# Patient Record
Sex: MALE | Race: WHITE | Employment: OTHER | URBAN - METROPOLITAN AREA
[De-identification: names, ages, dates, MRNs, and addresses within clinical notes are randomized per-mention and may not be internally consistent; named-entity substitution may affect disease eponyms.]

---

## 2017-01-06 ENCOUNTER — TELEPHONE (OUTPATIENT)
Dept: FAMILY MEDICINE CLINIC | Age: 67
End: 2017-01-06

## 2017-01-06 RX ORDER — CEPHALEXIN 500 MG/1
500 CAPSULE ORAL 3 TIMES DAILY
Qty: 30 CAPSULE | Refills: 0 | Status: SHIPPED | OUTPATIENT
Start: 2017-01-06 | End: 2017-01-16

## 2017-01-16 ENCOUNTER — OFFICE VISIT (OUTPATIENT)
Dept: INTERNAL MEDICINE | Age: 67
End: 2017-01-16

## 2017-01-16 VITALS
HEART RATE: 56 BPM | SYSTOLIC BLOOD PRESSURE: 140 MMHG | BODY MASS INDEX: 33.72 KG/M2 | DIASTOLIC BLOOD PRESSURE: 70 MMHG | HEIGHT: 72 IN | WEIGHT: 249 LBS

## 2017-01-16 DIAGNOSIS — H61.21 CERUMEN DEBRIS ON TYMPANIC MEMBRANE, RIGHT: ICD-10-CM

## 2017-01-16 DIAGNOSIS — H91.91 HEARING LOSS, RIGHT: ICD-10-CM

## 2017-01-16 DIAGNOSIS — I10 ESSENTIAL HYPERTENSION: ICD-10-CM

## 2017-01-16 DIAGNOSIS — E55.9 VITAMIN D DEFICIENCY: ICD-10-CM

## 2017-01-16 DIAGNOSIS — Z95.1 S/P CABG X 6: ICD-10-CM

## 2017-01-16 DIAGNOSIS — I25.10 ASCVD (ARTERIOSCLEROTIC CARDIOVASCULAR DISEASE): Primary | ICD-10-CM

## 2017-01-16 DIAGNOSIS — E78.5 HYPERLIPIDEMIA, UNSPECIFIED HYPERLIPIDEMIA TYPE: ICD-10-CM

## 2017-01-16 PROCEDURE — 99214 OFFICE O/P EST MOD 30 MIN: CPT | Performed by: INTERNAL MEDICINE

## 2017-01-16 PROCEDURE — 1123F ACP DISCUSS/DSCN MKR DOCD: CPT | Performed by: INTERNAL MEDICINE

## 2017-01-16 PROCEDURE — 4040F PNEUMOC VAC/ADMIN/RCVD: CPT | Performed by: INTERNAL MEDICINE

## 2017-01-16 PROCEDURE — G8427 DOCREV CUR MEDS BY ELIG CLIN: HCPCS | Performed by: INTERNAL MEDICINE

## 2017-01-16 PROCEDURE — G8599 NO ASA/ANTIPLAT THER USE RNG: HCPCS | Performed by: INTERNAL MEDICINE

## 2017-01-16 PROCEDURE — G8484 FLU IMMUNIZE NO ADMIN: HCPCS | Performed by: INTERNAL MEDICINE

## 2017-01-16 PROCEDURE — 93000 ELECTROCARDIOGRAM COMPLETE: CPT | Performed by: INTERNAL MEDICINE

## 2017-01-16 PROCEDURE — 1036F TOBACCO NON-USER: CPT | Performed by: INTERNAL MEDICINE

## 2017-01-16 PROCEDURE — G8419 CALC BMI OUT NRM PARAM NOF/U: HCPCS | Performed by: INTERNAL MEDICINE

## 2017-01-16 PROCEDURE — 3017F COLORECTAL CA SCREEN DOC REV: CPT | Performed by: INTERNAL MEDICINE

## 2017-01-16 RX ORDER — ERGOCALCIFEROL 1.25 MG/1
CAPSULE ORAL
Qty: 12 CAPSULE | Refills: 3 | Status: SHIPPED | OUTPATIENT
Start: 2017-01-16 | End: 2017-02-26 | Stop reason: SDUPTHER

## 2017-01-20 RX ORDER — ATORVASTATIN CALCIUM 40 MG/1
TABLET, FILM COATED ORAL
Qty: 90 TABLET | Refills: 1 | Status: SHIPPED | OUTPATIENT
Start: 2017-01-20 | End: 2017-07-10 | Stop reason: SDUPTHER

## 2017-02-26 DIAGNOSIS — E55.9 VITAMIN D DEFICIENCY: ICD-10-CM

## 2017-02-28 RX ORDER — ERGOCALCIFEROL 1.25 MG/1
CAPSULE ORAL
Qty: 12 CAPSULE | Refills: 1 | Status: SHIPPED | OUTPATIENT
Start: 2017-02-28 | End: 2017-08-15 | Stop reason: SDUPTHER

## 2017-04-26 DIAGNOSIS — I10 ESSENTIAL HYPERTENSION: ICD-10-CM

## 2017-04-26 DIAGNOSIS — I25.10 CORONARY ARTERY DISEASE INVOLVING NATIVE CORONARY ARTERY OF NATIVE HEART WITHOUT ANGINA PECTORIS: ICD-10-CM

## 2017-06-16 ENCOUNTER — TELEPHONE (OUTPATIENT)
Dept: FAMILY MEDICINE CLINIC | Age: 67
End: 2017-06-16

## 2017-06-16 ENCOUNTER — OFFICE VISIT (OUTPATIENT)
Dept: FAMILY MEDICINE CLINIC | Age: 67
End: 2017-06-16

## 2017-06-16 VITALS
RESPIRATION RATE: 16 BRPM | HEART RATE: 68 BPM | DIASTOLIC BLOOD PRESSURE: 66 MMHG | BODY MASS INDEX: 33.93 KG/M2 | SYSTOLIC BLOOD PRESSURE: 154 MMHG | WEIGHT: 250.5 LBS | HEIGHT: 72 IN

## 2017-06-16 DIAGNOSIS — S90.112A CONTUSION OF LEFT GREAT TOE WITHOUT DAMAGE TO NAIL, INITIAL ENCOUNTER: Primary | ICD-10-CM

## 2017-06-16 PROCEDURE — 99213 OFFICE O/P EST LOW 20 MIN: CPT | Performed by: FAMILY MEDICINE

## 2017-06-16 ASSESSMENT — ENCOUNTER SYMPTOMS
CONSTIPATION: 0
SHORTNESS OF BREATH: 0
SINUS PRESSURE: 0

## 2017-07-10 ENCOUNTER — OFFICE VISIT (OUTPATIENT)
Dept: INTERNAL MEDICINE | Age: 67
End: 2017-07-10

## 2017-07-10 VITALS
BODY MASS INDEX: 33.46 KG/M2 | HEIGHT: 72 IN | HEART RATE: 56 BPM | SYSTOLIC BLOOD PRESSURE: 130 MMHG | WEIGHT: 247 LBS | DIASTOLIC BLOOD PRESSURE: 70 MMHG

## 2017-07-10 DIAGNOSIS — Z95.1 S/P CABG X 6: ICD-10-CM

## 2017-07-10 DIAGNOSIS — E66.09 NON MORBID OBESITY DUE TO EXCESS CALORIES: ICD-10-CM

## 2017-07-10 DIAGNOSIS — I10 ESSENTIAL HYPERTENSION: ICD-10-CM

## 2017-07-10 DIAGNOSIS — E78.5 HYPERLIPIDEMIA, UNSPECIFIED HYPERLIPIDEMIA TYPE: ICD-10-CM

## 2017-07-10 DIAGNOSIS — I25.10 ASCVD (ARTERIOSCLEROTIC CARDIOVASCULAR DISEASE): Primary | ICD-10-CM

## 2017-07-10 PROCEDURE — 1123F ACP DISCUSS/DSCN MKR DOCD: CPT | Performed by: INTERNAL MEDICINE

## 2017-07-10 PROCEDURE — 99214 OFFICE O/P EST MOD 30 MIN: CPT | Performed by: INTERNAL MEDICINE

## 2017-07-10 PROCEDURE — 1036F TOBACCO NON-USER: CPT | Performed by: INTERNAL MEDICINE

## 2017-07-10 PROCEDURE — G8417 CALC BMI ABV UP PARAM F/U: HCPCS | Performed by: INTERNAL MEDICINE

## 2017-07-10 PROCEDURE — 3017F COLORECTAL CA SCREEN DOC REV: CPT | Performed by: INTERNAL MEDICINE

## 2017-07-10 PROCEDURE — 4040F PNEUMOC VAC/ADMIN/RCVD: CPT | Performed by: INTERNAL MEDICINE

## 2017-07-10 PROCEDURE — G8599 NO ASA/ANTIPLAT THER USE RNG: HCPCS | Performed by: INTERNAL MEDICINE

## 2017-07-10 PROCEDURE — 93000 ELECTROCARDIOGRAM COMPLETE: CPT | Performed by: INTERNAL MEDICINE

## 2017-07-10 PROCEDURE — G8427 DOCREV CUR MEDS BY ELIG CLIN: HCPCS | Performed by: INTERNAL MEDICINE

## 2017-07-10 RX ORDER — ATORVASTATIN CALCIUM 40 MG/1
TABLET, FILM COATED ORAL
Qty: 90 TABLET | Refills: 1 | Status: SHIPPED | OUTPATIENT
Start: 2017-07-10 | End: 2018-01-08 | Stop reason: SDUPTHER

## 2017-08-15 DIAGNOSIS — E55.9 VITAMIN D DEFICIENCY: ICD-10-CM

## 2017-08-16 RX ORDER — ERGOCALCIFEROL 1.25 MG/1
CAPSULE ORAL
Qty: 12 CAPSULE | Refills: 1 | Status: SHIPPED | OUTPATIENT
Start: 2017-08-16 | End: 2018-01-31 | Stop reason: SDUPTHER

## 2017-10-19 ENCOUNTER — NURSE ONLY (OUTPATIENT)
Dept: FAMILY MEDICINE CLINIC | Age: 67
End: 2017-10-19

## 2017-10-19 DIAGNOSIS — Z23 IMMUNIZATION DUE: Primary | ICD-10-CM

## 2017-10-19 PROCEDURE — G0008 ADMIN INFLUENZA VIRUS VAC: HCPCS | Performed by: EMERGENCY MEDICINE

## 2017-10-23 DIAGNOSIS — I10 ESSENTIAL HYPERTENSION: ICD-10-CM

## 2017-10-23 DIAGNOSIS — I25.10 CORONARY ARTERY DISEASE INVOLVING NATIVE CORONARY ARTERY OF NATIVE HEART WITHOUT ANGINA PECTORIS: ICD-10-CM

## 2018-01-08 ENCOUNTER — OFFICE VISIT (OUTPATIENT)
Dept: INTERNAL MEDICINE CLINIC | Age: 68
End: 2018-01-08
Payer: MEDICARE

## 2018-01-08 VITALS
DIASTOLIC BLOOD PRESSURE: 88 MMHG | WEIGHT: 243 LBS | HEIGHT: 72 IN | SYSTOLIC BLOOD PRESSURE: 156 MMHG | BODY MASS INDEX: 32.91 KG/M2 | HEART RATE: 52 BPM

## 2018-01-08 DIAGNOSIS — E78.5 HYPERLIPIDEMIA, UNSPECIFIED HYPERLIPIDEMIA TYPE: ICD-10-CM

## 2018-01-08 DIAGNOSIS — I10 ESSENTIAL HYPERTENSION: ICD-10-CM

## 2018-01-08 DIAGNOSIS — I25.10 ASCVD (ARTERIOSCLEROTIC CARDIOVASCULAR DISEASE): Primary | ICD-10-CM

## 2018-01-08 DIAGNOSIS — E66.9 OBESITY (BMI 30.0-34.9): ICD-10-CM

## 2018-01-08 DIAGNOSIS — Z95.1 S/P CABG X 6: ICD-10-CM

## 2018-01-08 PROCEDURE — 99214 OFFICE O/P EST MOD 30 MIN: CPT | Performed by: INTERNAL MEDICINE

## 2018-01-08 PROCEDURE — 93000 ELECTROCARDIOGRAM COMPLETE: CPT | Performed by: INTERNAL MEDICINE

## 2018-01-08 RX ORDER — ATORVASTATIN CALCIUM 40 MG/1
TABLET, FILM COATED ORAL
Qty: 90 TABLET | Refills: 1 | Status: SHIPPED | OUTPATIENT
Start: 2018-01-08 | End: 2018-07-07 | Stop reason: SDUPTHER

## 2018-01-08 ASSESSMENT — PATIENT HEALTH QUESTIONNAIRE - PHQ9
SUM OF ALL RESPONSES TO PHQ9 QUESTIONS 1 & 2: 0
SUM OF ALL RESPONSES TO PHQ QUESTIONS 1-9: 0
1. LITTLE INTEREST OR PLEASURE IN DOING THINGS: 0
2. FEELING DOWN, DEPRESSED OR HOPELESS: 0

## 2018-01-08 NOTE — PROGRESS NOTES
TAKE 1 TABLET TWICE A  tablet 1    vitamin D (ERGOCALCIFEROL) 13240 units CAPS capsule TAKE 1 CAPSULE ONCE WEEKLY 12 capsule 1    aspirin 81 MG tablet Take 1 tablet by mouth daily Last dose 5- for surgery 6-5-2015 30 tablet 0    Multiple Vitamin (MULTI-VITAMIN) TABS   Take by mouth daily Last dose 5- for surgery 6-5-2015      [DISCONTINUED] atorvastatin (LIPITOR) 40 MG tablet TAKE 1 TABLET DAILY 90 tablet 1    [DISCONTINUED] fluticasone (FLONASE) 50 MCG/ACT nasal spray 2 sprays by Nasal route daily 1 Bottle 3     No facility-administered encounter medications on file as of 1/8/2018. DIAGNOSES  1. ASCVD (arteriosclerotic cardiovascular disease)     2. Essential hypertension  EKG 12 Lead    Basic Metabolic Panel   3. Hyperlipidemia, unspecified hyperlipidemia type  atorvastatin (LIPITOR) 40 MG tablet    Basic Metabolic Panel    LDL Cholesterol, Direct    Lipid Panel   4. S/P CABG x 6,2007     5. Obesity (BMI 30.0-34. 9)     EKG shows Sinus  Bradycardia   Otherwise normal    Cardiac status stable, patient doing extremely well without any symptoms. I reviewed patient's medications and possible interactions and side effects. I refilled those that were needed. Reviewed all recent labs, will follow up labs ordered and notify patient of any changes in management  Per the recent ACC/AHA guidelines on the treatment of blood cholesterol to reduce atherosclerotic cardiovascular risk in adults there are four major statin benefit groups.     Individuals   1) with clinical ASCVD    2) with primary elevations of LDL-C> 190 mg/dl    3) without clinical ASCVD or diabetes with LDL-C >70 to 189 mg/dl and estimated 10 year ASCVD risk> 7.5%    4) with diabetes aged 43-69 with LDL-C 70 to 189 mg/dl and without clinical ASCVD    Patient falls in to group 1      The recommended treatment for each group is as follows:  1) high-intensity statin, if age > 76 or not a candidate for high-intensity statin moderate

## 2018-01-13 ENCOUNTER — HOSPITAL ENCOUNTER (OUTPATIENT)
Age: 68
Discharge: HOME | End: 2018-01-13
Payer: MEDICARE

## 2018-01-13 DIAGNOSIS — E78.5 HYPERLIPIDEMIA, UNSPECIFIED HYPERLIPIDEMIA TYPE: ICD-10-CM

## 2018-01-13 DIAGNOSIS — I10 ESSENTIAL HYPERTENSION: ICD-10-CM

## 2018-01-13 LAB
ANION GAP SERPL CALCULATED.3IONS-SCNC: 13 MEQ/L (ref 8–16)
BUN BLDV-MCNC: 16 MG/DL (ref 7–22)
CALCIUM SERPL-MCNC: 9 MG/DL (ref 8.5–10.5)
CHLORIDE BLD-SCNC: 108 MEQ/L (ref 98–111)
CHOLESTEROL, TOTAL: 100 MG/DL (ref 100–199)
CO2: 23 MEQ/L (ref 23–33)
CREAT SERPL-MCNC: 0.8 MG/DL (ref 0.4–1.2)
GFR SERPL CREATININE-BSD FRML MDRD: > 90 ML/MIN/1.73M2
GLUCOSE BLD-MCNC: 103 MG/DL (ref 70–108)
HDLC SERPL-MCNC: 34 MG/DL
LDL CHOLESTEROL CALCULATED: 36 MG/DL
LDL CHOLESTEROL DIRECT: 50.8 MG/DL
POTASSIUM SERPL-SCNC: 4.4 MEQ/L (ref 3.5–5.2)
SODIUM BLD-SCNC: 144 MEQ/L (ref 135–145)
TRIGL SERPL-MCNC: 148 MG/DL (ref 0–199)

## 2018-01-13 PROCEDURE — 36415 COLL VENOUS BLD VENIPUNCTURE: CPT

## 2018-01-13 PROCEDURE — 80048 BASIC METABOLIC PNL TOTAL CA: CPT

## 2018-01-13 PROCEDURE — 80061 LIPID PANEL: CPT

## 2018-01-13 PROCEDURE — 83721 ASSAY OF BLOOD LIPOPROTEIN: CPT

## 2018-01-31 DIAGNOSIS — E55.9 VITAMIN D DEFICIENCY: ICD-10-CM

## 2018-02-01 RX ORDER — ERGOCALCIFEROL 1.25 MG/1
CAPSULE ORAL
Qty: 12 CAPSULE | Refills: 1 | Status: SHIPPED | OUTPATIENT
Start: 2018-02-01 | End: 2018-07-09 | Stop reason: SDUPTHER

## 2018-04-22 DIAGNOSIS — I10 ESSENTIAL HYPERTENSION: ICD-10-CM

## 2018-04-22 DIAGNOSIS — I25.10 CORONARY ARTERY DISEASE INVOLVING NATIVE CORONARY ARTERY OF NATIVE HEART WITHOUT ANGINA PECTORIS: ICD-10-CM

## 2018-05-14 ENCOUNTER — TELEPHONE (OUTPATIENT)
Dept: FAMILY MEDICINE CLINIC | Age: 68
End: 2018-05-14

## 2018-05-14 ENCOUNTER — OFFICE VISIT (OUTPATIENT)
Dept: FAMILY MEDICINE CLINIC | Age: 68
End: 2018-05-14

## 2018-05-14 VITALS
DIASTOLIC BLOOD PRESSURE: 70 MMHG | HEIGHT: 72 IN | RESPIRATION RATE: 16 BRPM | WEIGHT: 237.8 LBS | BODY MASS INDEX: 32.21 KG/M2 | HEART RATE: 68 BPM | SYSTOLIC BLOOD PRESSURE: 120 MMHG

## 2018-05-14 DIAGNOSIS — I25.810 CORONARY ARTERY DISEASE INVOLVING CORONARY BYPASS GRAFT OF NATIVE HEART WITHOUT ANGINA PECTORIS: ICD-10-CM

## 2018-05-14 DIAGNOSIS — Z00.00 ROUTINE GENERAL MEDICAL EXAMINATION AT A HEALTH CARE FACILITY: Primary | ICD-10-CM

## 2018-05-14 DIAGNOSIS — I10 ESSENTIAL HYPERTENSION: ICD-10-CM

## 2018-05-14 DIAGNOSIS — I10 ESSENTIAL HYPERTENSION: Primary | ICD-10-CM

## 2018-05-14 DIAGNOSIS — I25.810 CORONARY ARTERY DISEASE INVOLVING AUTOLOGOUS VEIN CORONARY BYPASS GRAFT WITHOUT ANGINA PECTORIS: ICD-10-CM

## 2018-05-14 DIAGNOSIS — E78.5 HYPERLIPIDEMIA, UNSPECIFIED HYPERLIPIDEMIA TYPE: ICD-10-CM

## 2018-05-14 DIAGNOSIS — Z00.00 ENCOUNTER FOR MEDICARE ANNUAL WELLNESS EXAM: ICD-10-CM

## 2018-05-14 DIAGNOSIS — N40.0 BENIGN PROSTATIC HYPERPLASIA WITHOUT LOWER URINARY TRACT SYMPTOMS: ICD-10-CM

## 2018-05-14 PROCEDURE — G0439 PPPS, SUBSEQ VISIT: HCPCS | Performed by: FAMILY MEDICINE

## 2018-05-14 ASSESSMENT — LIFESTYLE VARIABLES: HOW OFTEN DO YOU HAVE A DRINK CONTAINING ALCOHOL: 0

## 2018-05-14 ASSESSMENT — ANXIETY QUESTIONNAIRES
GAD7 TOTAL SCORE: 0
GAD7 TOTAL SCORE: 0

## 2018-05-14 ASSESSMENT — PATIENT HEALTH QUESTIONNAIRE - PHQ9: SUM OF ALL RESPONSES TO PHQ QUESTIONS 1-9: 0

## 2018-05-17 ENCOUNTER — HOSPITAL ENCOUNTER (OUTPATIENT)
Age: 68
Discharge: HOME | End: 2018-05-17
Payer: MEDICARE

## 2018-05-17 DIAGNOSIS — E78.5 HYPERLIPIDEMIA, UNSPECIFIED HYPERLIPIDEMIA TYPE: ICD-10-CM

## 2018-05-17 DIAGNOSIS — I10 ESSENTIAL HYPERTENSION: ICD-10-CM

## 2018-05-17 DIAGNOSIS — N40.0 BENIGN PROSTATIC HYPERPLASIA WITHOUT LOWER URINARY TRACT SYMPTOMS: ICD-10-CM

## 2018-05-17 LAB
ALBUMIN SERPL-MCNC: 4 G/DL (ref 3.5–5.1)
ALP BLD-CCNC: 114 U/L (ref 38–126)
ALT SERPL-CCNC: 25 U/L (ref 11–66)
ANION GAP SERPL CALCULATED.3IONS-SCNC: 13 MEQ/L (ref 8–16)
AST SERPL-CCNC: 23 U/L (ref 5–40)
BASOPHILS # BLD: 0.4 %
BASOPHILS ABSOLUTE: 0 THOU/MM3 (ref 0–0.1)
BILIRUB SERPL-MCNC: 0.4 MG/DL (ref 0.3–1.2)
BUN BLDV-MCNC: 18 MG/DL (ref 7–22)
CALCIUM SERPL-MCNC: 9 MG/DL (ref 8.5–10.5)
CHLORIDE BLD-SCNC: 107 MEQ/L (ref 98–111)
CO2: 23 MEQ/L (ref 23–33)
CREAT SERPL-MCNC: 0.8 MG/DL (ref 0.4–1.2)
EOSINOPHIL # BLD: 4.8 %
EOSINOPHILS ABSOLUTE: 0.3 THOU/MM3 (ref 0–0.4)
GFR SERPL CREATININE-BSD FRML MDRD: > 90 ML/MIN/1.73M2
GLUCOSE BLD-MCNC: 97 MG/DL (ref 70–108)
HCT VFR BLD CALC: 41.7 % (ref 42–52)
HEMOGLOBIN: 14 GM/DL (ref 14–18)
LDL CHOLESTEROL DIRECT: 47.12 MG/DL
LYMPHOCYTES # BLD: 33.4 %
LYMPHOCYTES ABSOLUTE: 2.4 THOU/MM3 (ref 1–4.8)
MCH RBC QN AUTO: 31.9 PG (ref 27–31)
MCHC RBC AUTO-ENTMCNC: 33.6 GM/DL (ref 33–37)
MCV RBC AUTO: 94.9 FL (ref 80–94)
MONOCYTES # BLD: 9.7 %
MONOCYTES ABSOLUTE: 0.7 THOU/MM3 (ref 0.4–1.3)
NUCLEATED RED BLOOD CELLS: 0 /100 WBC
PDW BLD-RTO: 13.7 % (ref 11.5–14.5)
PLATELET # BLD: 172 THOU/MM3 (ref 130–400)
PMV BLD AUTO: 8.7 FL (ref 7.4–10.4)
POTASSIUM SERPL-SCNC: 4.1 MEQ/L (ref 3.5–5.2)
PROSTATE SPECIFIC ANTIGEN: 4.18 NG/ML (ref 0–1)
RBC # BLD: 4.4 MILL/MM3 (ref 4.7–6.1)
SEG NEUTROPHILS: 51.7 %
SEGMENTED NEUTROPHILS ABSOLUTE COUNT: 3.7 THOU/MM3 (ref 1.8–7.7)
SODIUM BLD-SCNC: 143 MEQ/L (ref 135–145)
TOTAL PROTEIN: 7.4 G/DL (ref 6.1–8)
TSH SERPL DL<=0.05 MIU/L-ACNC: 2.25 UIU/ML (ref 0.4–4.2)
WBC # BLD: 7.2 THOU/MM3 (ref 4.8–10.8)

## 2018-05-17 PROCEDURE — 84153 ASSAY OF PSA TOTAL: CPT

## 2018-05-17 PROCEDURE — 84443 ASSAY THYROID STIM HORMONE: CPT

## 2018-05-17 PROCEDURE — 80053 COMPREHEN METABOLIC PANEL: CPT

## 2018-05-17 PROCEDURE — 85025 COMPLETE CBC W/AUTO DIFF WBC: CPT

## 2018-05-17 PROCEDURE — 36415 COLL VENOUS BLD VENIPUNCTURE: CPT

## 2018-05-17 PROCEDURE — 83721 ASSAY OF BLOOD LIPOPROTEIN: CPT

## 2018-05-21 ENCOUNTER — TELEPHONE (OUTPATIENT)
Dept: FAMILY MEDICINE CLINIC | Age: 68
End: 2018-05-21

## 2018-07-07 DIAGNOSIS — E78.5 HYPERLIPIDEMIA, UNSPECIFIED HYPERLIPIDEMIA TYPE: ICD-10-CM

## 2018-07-09 ENCOUNTER — OFFICE VISIT (OUTPATIENT)
Dept: INTERNAL MEDICINE CLINIC | Age: 68
End: 2018-07-09
Payer: MEDICARE

## 2018-07-09 VITALS
BODY MASS INDEX: 31.97 KG/M2 | WEIGHT: 236 LBS | HEIGHT: 72 IN | SYSTOLIC BLOOD PRESSURE: 146 MMHG | DIASTOLIC BLOOD PRESSURE: 70 MMHG | HEART RATE: 56 BPM

## 2018-07-09 DIAGNOSIS — I10 ESSENTIAL HYPERTENSION: ICD-10-CM

## 2018-07-09 DIAGNOSIS — I25.10 ASCVD (ARTERIOSCLEROTIC CARDIOVASCULAR DISEASE): Primary | ICD-10-CM

## 2018-07-09 DIAGNOSIS — E55.9 VITAMIN D DEFICIENCY: ICD-10-CM

## 2018-07-09 DIAGNOSIS — E78.5 HYPERLIPIDEMIA, UNSPECIFIED HYPERLIPIDEMIA TYPE: ICD-10-CM

## 2018-07-09 DIAGNOSIS — Z95.1 S/P CABG X 6: ICD-10-CM

## 2018-07-09 DIAGNOSIS — T14.90XA TRAUMA: ICD-10-CM

## 2018-07-09 DIAGNOSIS — I25.10 CORONARY ARTERY DISEASE INVOLVING NATIVE CORONARY ARTERY OF NATIVE HEART WITHOUT ANGINA PECTORIS: ICD-10-CM

## 2018-07-09 PROCEDURE — 93000 ELECTROCARDIOGRAM COMPLETE: CPT | Performed by: INTERNAL MEDICINE

## 2018-07-09 PROCEDURE — 99214 OFFICE O/P EST MOD 30 MIN: CPT | Performed by: INTERNAL MEDICINE

## 2018-07-09 RX ORDER — ERGOCALCIFEROL 1.25 MG/1
CAPSULE ORAL
Qty: 12 CAPSULE | Refills: 1 | Status: SHIPPED | OUTPATIENT
Start: 2018-07-09 | End: 2018-12-17 | Stop reason: SDUPTHER

## 2018-07-09 RX ORDER — ATORVASTATIN CALCIUM 40 MG/1
TABLET, FILM COATED ORAL
Qty: 90 TABLET | Refills: 1 | Status: SHIPPED | OUTPATIENT
Start: 2018-07-09 | End: 2018-12-17 | Stop reason: SDUPTHER

## 2018-07-09 RX ORDER — SENNOSIDES 8.6 MG
650 CAPSULE ORAL EVERY 8 HOURS PRN
COMMUNITY

## 2018-07-09 NOTE — PROGRESS NOTES
bleeding disorder. Respiratory ROS: no cough, shortness of breath, or wheezing  Cardiovascular ROS: no chest pain or dyspnea on exertion  Gastrointestinal ROS: no abdominal pain, change in bowel habits, or black or bloody stools  Genito-Urinary ROS: no dysuria, trouble voiding, or hematuria  Musculoskeletal ROS:  negative  Neurological ROS: no TIA or stroke symptoms  Dermatological ROS: negative    Blood pressure (!) 146/70, pulse 56, height 5' 11.65\" (1.82 m), weight 236 lb (107 kg). Body mass index is 32.32 kg/m². Physical Examination: General appearance - alert, well appearing, and in no distress  Mental status - alert, oriented to person, place, and time  HEENT-right eye bandaged ecchymoses present  Neck - Neck is supple, no JVD or carotid bruits. No thyromegaly or adenopathy. Chest - clear to auscultation, no wheezes, rales or rhonchi, symmetric air entry  Heart - normal rate, regular rhythm, normal S1, S2, no murmurs, rubs, clicks or gallops  Abdomen - soft, nontender, nondistended, no masses or organomegaly  Neurological - alert, oriented, normal speech, no focal findings or movement disorder noted  Extremities - peripheral pulses normal, no pedal edema, no clubbing or cyanosis  Skin - normal coloration and turgor, no rashes, no suspicious skin lesions noted    Orders Placed This Encounter   Procedures    EKG 12 Lead     Order Specific Question:   Reason for Exam?     Answer:    Other       Outpatient Encounter Prescriptions as of 7/9/2018   Medication Sig Dispense Refill    atorvastatin (LIPITOR) 40 MG tablet TAKE 1 TABLET DAILY 90 tablet 1    acetaminophen (TYLENOL ARTHRITIS PAIN) 650 MG extended release tablet Take 650 mg by mouth every 8 hours as needed for Pain      metoprolol tartrate (LOPRESSOR) 25 MG tablet TAKE 1 TABLET TWICE A  tablet 1    vitamin D (ERGOCALCIFEROL) 06646 units CAPS capsule TAKE 1 CAPSULE ONCE WEEKLY 12 capsule 1    sodium chloride (OCEAN, BABY AYR) 0.65 % nasal spray 1 spray by Nasal route as needed for Congestion      aspirin 81 MG tablet Take 1 tablet by mouth daily Last dose 5- for surgery 6-5-2015 30 tablet 0    Multiple Vitamin (MULTI-VITAMIN) TABS   Take by mouth daily Last dose 5- for surgery 6-5-2015      [DISCONTINUED] metoprolol tartrate (LOPRESSOR) 25 MG tablet TAKE 1 TABLET TWICE A  tablet 1    [DISCONTINUED] vitamin D (ERGOCALCIFEROL) 40681 units CAPS capsule TAKE 1 CAPSULE ONCE WEEKLY 12 capsule 1     No facility-administered encounter medications on file as of 7/9/2018. DIAGNOSES   Diagnosis Orders   1. ASCVD (arteriosclerotic cardiovascular disease)     2. Coronary artery disease involving native coronary artery of native heart without angina pectoris  metoprolol tartrate (LOPRESSOR) 25 MG tablet   3. Essential hypertension  metoprolol tartrate (LOPRESSOR) 25 MG tablet    stable   4. Vitamin D deficiency  EKG 12 Lead    vitamin D (ERGOCALCIFEROL) 66022 units CAPS capsule   5. Hyperlipidemia, unspecified hyperlipidemia type     6. S/P CABG x 6,2007     7. Trauma     EKG shows Sinus  Bradycardia   Otherwise normal    Cardiac status stable, patient doing extremely well without any symptoms. I reviewed patient's medications and possible interactions and side effects. I refilled those that were needed. Reviewed all recent labs, will follow up labs ordered and notify patient of any changes in management  Per the recent ACC/AHA guidelines on the treatment of blood cholesterol to reduce atherosclerotic cardiovascular risk in adults there are four major statin benefit groups.     Individuals   1) with clinical ASCVD    2) with primary elevations of LDL-C> 190 mg/dl    3) without clinical ASCVD or diabetes with LDL-C >70 to 189 mg/dl and estimated 10 year ASCVD risk> 7.5%    4) with diabetes aged 43-69 with LDL-C 70 to 189 mg/dl and without clinical ASCVD    Patient falls in to group 1      The recommended treatment for each group is

## 2018-12-17 ENCOUNTER — OFFICE VISIT (OUTPATIENT)
Dept: INTERNAL MEDICINE CLINIC | Age: 68
End: 2018-12-17
Payer: MEDICARE

## 2018-12-17 VITALS
DIASTOLIC BLOOD PRESSURE: 84 MMHG | WEIGHT: 237 LBS | BODY MASS INDEX: 32.1 KG/M2 | HEIGHT: 72 IN | SYSTOLIC BLOOD PRESSURE: 138 MMHG | HEART RATE: 56 BPM

## 2018-12-17 DIAGNOSIS — E55.9 VITAMIN D DEFICIENCY: ICD-10-CM

## 2018-12-17 DIAGNOSIS — Z95.1 S/P CABG X 6: ICD-10-CM

## 2018-12-17 DIAGNOSIS — I25.10 CORONARY ARTERY DISEASE INVOLVING NATIVE CORONARY ARTERY OF NATIVE HEART WITHOUT ANGINA PECTORIS: ICD-10-CM

## 2018-12-17 DIAGNOSIS — I25.10 ASCVD (ARTERIOSCLEROTIC CARDIOVASCULAR DISEASE): Primary | ICD-10-CM

## 2018-12-17 DIAGNOSIS — E78.5 HYPERLIPIDEMIA, UNSPECIFIED HYPERLIPIDEMIA TYPE: ICD-10-CM

## 2018-12-17 DIAGNOSIS — I10 ESSENTIAL HYPERTENSION: ICD-10-CM

## 2018-12-17 PROCEDURE — 93000 ELECTROCARDIOGRAM COMPLETE: CPT | Performed by: INTERNAL MEDICINE

## 2018-12-17 PROCEDURE — 99214 OFFICE O/P EST MOD 30 MIN: CPT | Performed by: INTERNAL MEDICINE

## 2018-12-17 RX ORDER — ATORVASTATIN CALCIUM 40 MG/1
TABLET, FILM COATED ORAL
Qty: 90 TABLET | Refills: 1 | Status: SHIPPED | OUTPATIENT
Start: 2018-12-17 | End: 2018-12-18 | Stop reason: SDUPTHER

## 2018-12-17 RX ORDER — ERGOCALCIFEROL 1.25 MG/1
CAPSULE ORAL
Qty: 12 CAPSULE | Refills: 1 | Status: SHIPPED | OUTPATIENT
Start: 2018-12-17 | End: 2018-12-18 | Stop reason: SDUPTHER

## 2018-12-18 DIAGNOSIS — E78.5 HYPERLIPIDEMIA, UNSPECIFIED HYPERLIPIDEMIA TYPE: ICD-10-CM

## 2018-12-18 DIAGNOSIS — E55.9 VITAMIN D DEFICIENCY: ICD-10-CM

## 2018-12-18 DIAGNOSIS — I25.10 CORONARY ARTERY DISEASE INVOLVING NATIVE CORONARY ARTERY OF NATIVE HEART WITHOUT ANGINA PECTORIS: ICD-10-CM

## 2018-12-18 DIAGNOSIS — I10 ESSENTIAL HYPERTENSION: ICD-10-CM

## 2018-12-18 NOTE — TELEPHONE ENCOUNTER
Canceled previous script at [facility] , spoke with Norma Anaya Patient is requesting scripts be sent to his mail pharmacy.

## 2018-12-20 RX ORDER — ATORVASTATIN CALCIUM 40 MG/1
TABLET, FILM COATED ORAL
Qty: 90 TABLET | Refills: 1 | Status: SHIPPED | OUTPATIENT
Start: 2018-12-20 | End: 2019-06-17 | Stop reason: SDUPTHER

## 2018-12-20 RX ORDER — ERGOCALCIFEROL 1.25 MG/1
CAPSULE ORAL
Qty: 12 CAPSULE | Refills: 1 | Status: SHIPPED | OUTPATIENT
Start: 2018-12-20 | End: 2019-06-06 | Stop reason: SDUPTHER

## 2018-12-28 ENCOUNTER — HOSPITAL ENCOUNTER (OUTPATIENT)
Age: 68
Discharge: HOME | End: 2018-12-28
Payer: MEDICARE

## 2018-12-28 DIAGNOSIS — E78.5 HYPERLIPIDEMIA, UNSPECIFIED HYPERLIPIDEMIA TYPE: ICD-10-CM

## 2018-12-28 LAB
ALT SERPL-CCNC: 30 U/L (ref 11–66)
LDL CHOLESTEROL DIRECT: 47.56 MG/DL

## 2018-12-28 PROCEDURE — 84460 ALANINE AMINO (ALT) (SGPT): CPT

## 2018-12-28 PROCEDURE — 83721 ASSAY OF BLOOD LIPOPROTEIN: CPT

## 2018-12-28 PROCEDURE — 36415 COLL VENOUS BLD VENIPUNCTURE: CPT

## 2019-06-06 DIAGNOSIS — E55.9 VITAMIN D DEFICIENCY: ICD-10-CM

## 2019-06-07 RX ORDER — ERGOCALCIFEROL 1.25 MG/1
CAPSULE ORAL
Qty: 12 CAPSULE | Refills: 1 | Status: SHIPPED | OUTPATIENT
Start: 2019-06-07 | End: 2019-09-16 | Stop reason: SDUPTHER

## 2019-06-17 ENCOUNTER — OFFICE VISIT (OUTPATIENT)
Dept: INTERNAL MEDICINE CLINIC | Age: 69
End: 2019-06-17
Payer: MEDICARE

## 2019-06-17 VITALS
WEIGHT: 239 LBS | HEIGHT: 71 IN | DIASTOLIC BLOOD PRESSURE: 76 MMHG | SYSTOLIC BLOOD PRESSURE: 150 MMHG | HEART RATE: 60 BPM | BODY MASS INDEX: 33.46 KG/M2

## 2019-06-17 DIAGNOSIS — R97.20 ELEVATED PSA: ICD-10-CM

## 2019-06-17 DIAGNOSIS — R53.83 FATIGUE, UNSPECIFIED TYPE: ICD-10-CM

## 2019-06-17 DIAGNOSIS — I10 ESSENTIAL HYPERTENSION: ICD-10-CM

## 2019-06-17 DIAGNOSIS — E78.5 HYPERLIPIDEMIA, UNSPECIFIED HYPERLIPIDEMIA TYPE: ICD-10-CM

## 2019-06-17 DIAGNOSIS — I25.10 CORONARY ARTERY DISEASE INVOLVING NATIVE CORONARY ARTERY OF NATIVE HEART WITHOUT ANGINA PECTORIS: Primary | ICD-10-CM

## 2019-06-17 PROCEDURE — 93000 ELECTROCARDIOGRAM COMPLETE: CPT | Performed by: INTERNAL MEDICINE

## 2019-06-17 PROCEDURE — 99214 OFFICE O/P EST MOD 30 MIN: CPT | Performed by: NURSE PRACTITIONER

## 2019-06-17 RX ORDER — ATORVASTATIN CALCIUM 40 MG/1
TABLET, FILM COATED ORAL
Qty: 90 TABLET | Refills: 1 | Status: SHIPPED | OUTPATIENT
Start: 2019-06-17 | End: 2019-12-14 | Stop reason: SDUPTHER

## 2019-06-17 NOTE — PROGRESS NOTES
[facility]  [address] [facility] [address] [facility] [address]  [address]  [place] [address]  Dept: [phone]  Dept Fax: [phone]  Loc: [phone]     Visit Date:  6/17/2019    Patient:  Freddy Spear  YOB: 1950    HPI:     Chief Complaint   Patient presents with    Coronary Artery Disease    Hypertension    Hyperlipidemia     Early Knock presents today for medical evaluation of CAD, HTN, Hyperlipidemia, and fatigue. Dr. Sina Tang last seen him 6 months ago. He follows routinely with Dr. Prabha Gu. CAD/HTN-  CABG in 2007. He reports he is taking medications as instructed, with no medication side effects. He does not perform home BP monitoring. Reports occasional shortness of breath on exertion which is not new and is unchanged. Denies chest pain, swelling of ankles, palpitations, and orthostatic dizziness or lightheadedness. /76 today in office. EKG completed. Stress test in 2010, normal. Cardiac catheterization completed in 2007 post CABG. Hyperlipidemia-  He states he is taking atorvastatin 40 mg daily as ordered. Lifestyle modifications including heart healthy diet, weight loss and exercise are/are not being followed. Last lipid profile shows LDL 47.46 < 100 (<70 with CVD) in 12/2018, triglycerides 148 <150, HDL 34 >40 (male, HDL >50 female) in 1/2018. Fatigue-  [facility] reports an increase in fatigue recently. He states that he sleeps 8 hours, and still feels tired. This has been progressively worsening over the last 6 months. He does not believe he snores or stops breathing in sleep. Denies any blood in his urine or stool. Appetite is fair.  Will check TSH, repeat PSA, CMP, CBC, and stress test.      Medications    Current Outpatient Medications:     metoprolol tartrate (LOPRESSOR) 25 MG tablet, TAKE 1 TABLET TWICE A DAY, Disp: 180 tablet, Rfl: 1    atorvastatin (LIPITOR) 40 MG tablet, TAKE 1 TABLET DAILY, Disp: 90 tablet,

## 2019-06-26 ENCOUNTER — HOSPITAL ENCOUNTER (OUTPATIENT)
Dept: NON INVASIVE DIAGNOSTICS | Age: 69
Discharge: HOME | End: 2019-06-26
Payer: MEDICARE

## 2019-06-26 VITALS — HEIGHT: 71 IN | BODY MASS INDEX: 32.76 KG/M2 | WEIGHT: 234 LBS

## 2019-06-26 DIAGNOSIS — I25.10 CORONARY ARTERY DISEASE INVOLVING NATIVE CORONARY ARTERY OF NATIVE HEART WITHOUT ANGINA PECTORIS: ICD-10-CM

## 2019-06-26 PROCEDURE — 2709999900 HC NON-CHARGEABLE SUPPLY

## 2019-06-26 PROCEDURE — 78452 HT MUSCLE IMAGE SPECT MULT: CPT | Performed by: INTERNAL MEDICINE

## 2019-06-26 PROCEDURE — 93017 CV STRESS TEST TRACING ONLY: CPT | Performed by: INTERNAL MEDICINE

## 2019-06-26 PROCEDURE — A9500 TC99M SESTAMIBI: HCPCS | Performed by: NURSE PRACTITIONER

## 2019-06-26 PROCEDURE — 6360000002 HC RX W HCPCS

## 2019-06-26 PROCEDURE — 3430000000 HC RX DIAGNOSTIC RADIOPHARMACEUTICAL: Performed by: NURSE PRACTITIONER

## 2019-06-26 RX ADMIN — Medication 10.6 MILLICURIE: at 13:35

## 2019-06-26 RX ADMIN — Medication 34.9 MILLICURIE: at 14:25

## 2019-06-27 ENCOUNTER — TELEPHONE (OUTPATIENT)
Dept: INTERNAL MEDICINE CLINIC | Age: 69
End: 2019-06-27

## 2019-07-10 ENCOUNTER — HOSPITAL ENCOUNTER (OUTPATIENT)
Age: 69
Discharge: HOME | End: 2019-07-10
Payer: MEDICARE

## 2019-07-10 ENCOUNTER — TELEPHONE (OUTPATIENT)
Dept: INTERNAL MEDICINE CLINIC | Age: 69
End: 2019-07-10

## 2019-07-10 DIAGNOSIS — R97.20 ELEVATED PSA: ICD-10-CM

## 2019-07-10 DIAGNOSIS — I25.10 CORONARY ARTERY DISEASE INVOLVING NATIVE CORONARY ARTERY OF NATIVE HEART WITHOUT ANGINA PECTORIS: ICD-10-CM

## 2019-07-10 DIAGNOSIS — R53.83 FATIGUE, UNSPECIFIED TYPE: ICD-10-CM

## 2019-07-10 LAB
ALBUMIN SERPL-MCNC: 3.9 G/DL (ref 3.5–5.1)
ALP BLD-CCNC: 147 U/L (ref 38–126)
ALT SERPL-CCNC: 38 U/L (ref 11–66)
ANION GAP SERPL CALCULATED.3IONS-SCNC: 13 MEQ/L (ref 8–16)
AST SERPL-CCNC: 31 U/L (ref 5–40)
BILIRUB SERPL-MCNC: 0.4 MG/DL (ref 0.3–1.2)
BUN BLDV-MCNC: 19 MG/DL (ref 7–22)
CALCIUM SERPL-MCNC: 9.3 MG/DL (ref 8.5–10.5)
CHLORIDE BLD-SCNC: 106 MEQ/L (ref 98–111)
CO2: 23 MEQ/L (ref 23–33)
CREAT SERPL-MCNC: 0.8 MG/DL (ref 0.4–1.2)
ERYTHROCYTE [DISTWIDTH] IN BLOOD BY AUTOMATED COUNT: 13.3 % (ref 11.5–14.5)
ERYTHROCYTE [DISTWIDTH] IN BLOOD BY AUTOMATED COUNT: 46 FL (ref 35–45)
GFR SERPL CREATININE-BSD FRML MDRD: > 90 ML/MIN/1.73M2
GLUCOSE BLD-MCNC: 94 MG/DL (ref 70–108)
HCT VFR BLD CALC: 42.7 % (ref 42–52)
HEMOGLOBIN: 14.2 GM/DL (ref 14–18)
MCH RBC QN AUTO: 31.3 PG (ref 26–33)
MCHC RBC AUTO-ENTMCNC: 33.3 GM/DL (ref 32.2–35.5)
MCV RBC AUTO: 94.3 FL (ref 80–94)
PLATELET # BLD: 184 THOU/MM3 (ref 130–400)
PMV BLD AUTO: 10.3 FL (ref 9.4–12.4)
POTASSIUM SERPL-SCNC: 4.3 MEQ/L (ref 3.5–5.2)
PROSTATE SPECIFIC ANTIGEN: 3.89 NG/ML (ref 0–1)
RBC # BLD: 4.53 MILL/MM3 (ref 4.7–6.1)
SODIUM BLD-SCNC: 142 MEQ/L (ref 135–145)
TOTAL PROTEIN: 7.6 G/DL (ref 6.1–8)
TSH SERPL DL<=0.05 MIU/L-ACNC: 3.41 UIU/ML (ref 0.4–4.2)
WBC # BLD: 7.3 THOU/MM3 (ref 4.8–10.8)

## 2019-07-10 PROCEDURE — 80053 COMPREHEN METABOLIC PANEL: CPT

## 2019-07-10 PROCEDURE — 84443 ASSAY THYROID STIM HORMONE: CPT

## 2019-07-10 PROCEDURE — 84153 ASSAY OF PSA TOTAL: CPT

## 2019-07-10 PROCEDURE — 85027 COMPLETE CBC AUTOMATED: CPT

## 2019-07-10 PROCEDURE — 36415 COLL VENOUS BLD VENIPUNCTURE: CPT

## 2019-07-10 ASSESSMENT — ENCOUNTER SYMPTOMS
COUGH: 0
WHEEZING: 0
NAUSEA: 0
ABDOMINAL PAIN: 0
VOMITING: 0
PHOTOPHOBIA: 0
SINUS PAIN: 0
DIARRHEA: 0
SHORTNESS OF BREATH: 1
EYE PAIN: 0
SORE THROAT: 0
TROUBLE SWALLOWING: 0
ABDOMINAL DISTENTION: 0
SINUS PRESSURE: 0
EYE REDNESS: 0
RHINORRHEA: 0
CONSTIPATION: 0
BLOOD IN STOOL: 0

## 2019-07-10 NOTE — TELEPHONE ENCOUNTER
----- Message from RASHAD Sims CNP sent at 7/10/2019  4:19 PM EDT -----  Has he seen Urology? Let him know his PSA is elevated, but it has been for 5 years. Does he have any trouble urinating?  TSH normal.

## 2019-09-13 ENCOUNTER — ANESTHESIA (OUTPATIENT)
Dept: ENDOSCOPY | Age: 69
End: 2019-09-13
Payer: MEDICARE

## 2019-09-13 ENCOUNTER — HOSPITAL ENCOUNTER (OUTPATIENT)
Age: 69
Setting detail: OUTPATIENT SURGERY
Discharge: HOME | End: 2019-09-13
Attending: INTERNAL MEDICINE | Admitting: INTERNAL MEDICINE
Payer: MEDICARE

## 2019-09-13 ENCOUNTER — ANESTHESIA EVENT (OUTPATIENT)
Dept: ENDOSCOPY | Age: 69
End: 2019-09-13
Payer: MEDICARE

## 2019-09-13 VITALS
SYSTOLIC BLOOD PRESSURE: 118 MMHG | OXYGEN SATURATION: 98 % | DIASTOLIC BLOOD PRESSURE: 67 MMHG | RESPIRATION RATE: 16 BRPM

## 2019-09-13 VITALS
HEIGHT: 71 IN | BODY MASS INDEX: 33.15 KG/M2 | OXYGEN SATURATION: 96 % | SYSTOLIC BLOOD PRESSURE: 132 MMHG | DIASTOLIC BLOOD PRESSURE: 66 MMHG | RESPIRATION RATE: 18 BRPM | TEMPERATURE: 97.4 F | WEIGHT: 236.8 LBS | HEART RATE: 64 BPM

## 2019-09-13 PROCEDURE — 2580000003 HC RX 258: Performed by: INTERNAL MEDICINE

## 2019-09-13 PROCEDURE — 7100000001 HC PACU RECOVERY - ADDTL 15 MIN: Performed by: INTERNAL MEDICINE

## 2019-09-13 PROCEDURE — 7100000000 HC PACU RECOVERY - FIRST 15 MIN: Performed by: INTERNAL MEDICINE

## 2019-09-13 PROCEDURE — 3700000001 HC ADD 15 MINUTES (ANESTHESIA): Performed by: INTERNAL MEDICINE

## 2019-09-13 PROCEDURE — 3609010600 HC COLONOSCOPY POLYPECTOMY SNARE/COLD BIOPSY: Performed by: INTERNAL MEDICINE

## 2019-09-13 PROCEDURE — 2709999900 HC NON-CHARGEABLE SUPPLY: Performed by: INTERNAL MEDICINE

## 2019-09-13 PROCEDURE — 3700000000 HC ANESTHESIA ATTENDED CARE: Performed by: INTERNAL MEDICINE

## 2019-09-13 PROCEDURE — 6360000002 HC RX W HCPCS: Performed by: NURSE ANESTHETIST, CERTIFIED REGISTERED

## 2019-09-13 PROCEDURE — 88305 TISSUE EXAM BY PATHOLOGIST: CPT

## 2019-09-13 RX ORDER — PROPOFOL 10 MG/ML
INJECTION, EMULSION INTRAVENOUS PRN
Status: DISCONTINUED | OUTPATIENT
Start: 2019-09-13 | End: 2019-09-13 | Stop reason: SDUPTHER

## 2019-09-13 RX ORDER — SODIUM CHLORIDE 450 MG/100ML
INJECTION, SOLUTION INTRAVENOUS CONTINUOUS
Status: DISCONTINUED | OUTPATIENT
Start: 2019-09-13 | End: 2019-09-13 | Stop reason: HOSPADM

## 2019-09-13 RX ADMIN — PROPOFOL 350 MG: 10 INJECTION, EMULSION INTRAVENOUS at 14:56

## 2019-09-13 RX ADMIN — SODIUM CHLORIDE: 4.5 INJECTION, SOLUTION INTRAVENOUS at 13:45

## 2019-09-13 ASSESSMENT — PAIN - FUNCTIONAL ASSESSMENT: PAIN_FUNCTIONAL_ASSESSMENT: 0-10

## 2019-09-13 NOTE — PROGRESS NOTES
1525 Awake and oriented on arrival to PACU   1528 wife to bedside   1530 DR Fournier to bedside to speak to pt and wife  56 Pt passing gas pt given something to drink   1545 pt denies any pain or nausea   1605 discharge instruction given to pt and wife all questions answered     [phone] transported to [facility] per wheel chair

## 2019-09-13 NOTE — ANESTHESIA POSTPROCEDURE EVALUATION
Department of Anesthesiology  Postprocedure Note    Patient: Nasima Milan  MRN: [id number]  YOB: 1950  Date of evaluation: 9/13/2019  Time:  3:23 PM     Procedure Summary     Date:  09/13/19 Room / Location:  [address] ENDO 11 / [address] [place]    Anesthesia Start:  6098 Anesthesia Stop:  4148    Procedure:  COLONOSCOPY POLYPECTOMY SNARE/COLD BIOPSY (Left ) Diagnosis:  (HX COLON POLYPS)    Surgeon:  Georgie Thornton MD Responsible Provider:  Ritika Escobar DO    Anesthesia Type:  MAC ASA Status:  3          Anesthesia Type: MAC    Moira Phase I: Moira Score: 10    Moira Phase II:      Last vitals: Reviewed and per EMR flowsheets.        Anesthesia Post Evaluation    Patient location during evaluation: bedside  Patient participation: complete - patient participated  Level of consciousness: awake  Airway patency: patent  Nausea & Vomiting: no vomiting and no nausea  Complications: no  Cardiovascular status: hemodynamically stable  Respiratory status: acceptable  Hydration status: stable

## 2019-09-13 NOTE — H&P
[facility]    Pre-Endoscopy H&PE      Patient: Peng El    : 1950    Acct#: [de-identified]  Primary Care Physician: Yaa Morales MD   Date of evaluation: 2019    Planned Procedure:    []EGD    []Enteroscopy    []PEG    []PEG change    []PEG removal  []Variceal banding    []Biopsy   []Dilation      []Control of bleeding  []Destruction of lesion by [facility]    []Stent Placement  []Foreign Body Removal    []Snare Polypectomy  []Other:       [x]Colonoscopy  []Flex Sigmoid []EUS, rectal      []Biopsy   []Dilation      []Control of bleeding  []Destruction of lesion by [facility]    []Stent Placement  []Foreign Body Removal    []Snare Polypectomy  []Other:      Planned Sedation  []Conscious Sedation [x]MAC/Propofol []Anesthesia    []None    Airway:  Adequate for planned sedation    Indication:   History of colon polyps    History:  The patient is a 78 y.o.  male who I saw for a screening colonoscopy 2009 and removed a single TA. I repeated a colonoscopy 2014 and removed another TA. Colonoscopy was difficult requiring placing patient in the supine position with epigastric splinting even with a torqueless insertion. Physical Exam:  VITALS: BP (!) 145/69   Pulse 67   Temp 97.6 °F (36.4 °C) (Temporal)   Resp 20   Ht 5' 11\" (1.803 m)   Wt 236 lb 12.8 oz (107.4 kg)   SpO2 98%   BMI 33.03 kg/m²   The patient is a 78 y.o.  male in no acute distress. HEAD: Normal cephalic/atraumatic. Extra-occular motions intact bilaterally. NECK: No lymphadenopathy or bruits. CHEST: Rises equally on inspiration. Clear to auscultation bilaterally. CARDIOVASCULAR: Regular rate and rhythm without murmurs, rubs or gallops. ABDOMEN: Soft, nontender, and nondistended with normal bowel sounds. No Hepatosplemomegaly. UPPER EXTREMITIES: no cyanosis, clubbing, or edema. DERM: no rash or jaundice. LOWER EXTREMITIES: no cyanosis, clubbing, or edema.   NEURO: Alert and status:      Spouse name: Not on file    Number of children: Not on file    Years of education: Not on file    Highest education level: Not on file   Occupational History    Not on file   Social Needs    Financial resource strain: Not on file    Food insecurity:     Worry: Not on file     Inability: Not on file    Transportation needs:     Medical: Not on file     Non-medical: Not on file   Tobacco Use    Smoking status: Former Smoker     Packs/day: 1.50     Years: 30.00     Pack years: 45.00     Types: Cigarettes     Last attempt to quit: 6/3/2002     Years since quittin.2    Smokeless tobacco: Never Used   Substance and Sexual Activity    Alcohol use: Yes     Comment: rarely    Drug use: No    Sexual activity: Yes     Partners: Female   Lifestyle    Physical activity:     Days per week: Not on file     Minutes per session: Not on file    Stress: Not on file   Relationships    Social connections:     Talks on phone: Not on file     Gets together: Not on file     Attends Christian service: Not on file     Active member of club or organization: Not on file     Attends meetings of clubs or organizations: Not on file     Relationship status: Not on file    Intimate partner violence:     Fear of current or ex partner: Not on file     Emotionally abused: Not on file     Physically abused: Not on file     Forced sexual activity: Not on file   Other Topics Concern    Not on file   Social History Narrative    Not on file     Family History:   No GI malignancies     ROS:  GENERAL: No fever or chills. NEURO: No headache or seizure. EYES: No diplopia or vision loss. CARDIOVASCULAR: No chest pain or palpitations. RESPIRATORY: No dyspnea or cough. GI: NO melena. NO hematochezia   : No dysuria or hematuria. GYN:  Not appropriate. MUSCULOSKELETAL: No new arthralgias or myalgias  DERM: No rash or jaundice. ENDOCRINE: No polyuria or polydipsia.    PSYCH: No anxiety or

## 2019-09-16 ENCOUNTER — OFFICE VISIT (OUTPATIENT)
Dept: INTERNAL MEDICINE CLINIC | Age: 69
End: 2019-09-16
Payer: MEDICARE

## 2019-09-16 VITALS
WEIGHT: 242 LBS | RESPIRATION RATE: 20 BRPM | BODY MASS INDEX: 33.88 KG/M2 | HEART RATE: 60 BPM | DIASTOLIC BLOOD PRESSURE: 82 MMHG | SYSTOLIC BLOOD PRESSURE: 132 MMHG | HEIGHT: 71 IN

## 2019-09-16 DIAGNOSIS — I10 ESSENTIAL HYPERTENSION: ICD-10-CM

## 2019-09-16 DIAGNOSIS — E55.9 VITAMIN D DEFICIENCY: ICD-10-CM

## 2019-09-16 DIAGNOSIS — E78.5 HYPERLIPIDEMIA, UNSPECIFIED HYPERLIPIDEMIA TYPE: ICD-10-CM

## 2019-09-16 DIAGNOSIS — I25.10 ASCVD (ARTERIOSCLEROTIC CARDIOVASCULAR DISEASE): Primary | ICD-10-CM

## 2019-09-16 PROCEDURE — 3288F FALL RISK ASSESSMENT DOCD: CPT | Performed by: NURSE PRACTITIONER

## 2019-09-16 PROCEDURE — 99214 OFFICE O/P EST MOD 30 MIN: CPT | Performed by: NURSE PRACTITIONER

## 2019-09-16 PROCEDURE — G8510 SCR DEP NEG, NO PLAN REQD: HCPCS | Performed by: NURSE PRACTITIONER

## 2019-09-16 RX ORDER — ERGOCALCIFEROL 1.25 MG/1
50000 CAPSULE ORAL WEEKLY
Qty: 12 CAPSULE | Refills: 1 | Status: SHIPPED | OUTPATIENT
Start: 2019-09-16 | End: 2020-04-16

## 2019-09-16 RX ORDER — ERGOCALCIFEROL 1.25 MG/1
50000 CAPSULE ORAL WEEKLY
Qty: 12 CAPSULE | Refills: 1 | Status: SHIPPED | OUTPATIENT
Start: 2019-09-16 | End: 2019-09-16 | Stop reason: SDUPTHER

## 2019-09-16 ASSESSMENT — ENCOUNTER SYMPTOMS
SINUS PAIN: 0
WHEEZING: 0
TROUBLE SWALLOWING: 0
SINUS PRESSURE: 0
ABDOMINAL DISTENTION: 0
CONSTIPATION: 0
BLOOD IN STOOL: 0
COUGH: 0
NAUSEA: 0
PHOTOPHOBIA: 0
RHINORRHEA: 0
SHORTNESS OF BREATH: 0
ABDOMINAL PAIN: 0
SORE THROAT: 0
DIARRHEA: 0

## 2019-09-16 ASSESSMENT — PATIENT HEALTH QUESTIONNAIRE - PHQ9
SUM OF ALL RESPONSES TO PHQ QUESTIONS 1-9: 0
2. FEELING DOWN, DEPRESSED OR HOPELESS: 0
1. LITTLE INTEREST OR PLEASURE IN DOING THINGS: 0
SUM OF ALL RESPONSES TO PHQ QUESTIONS 1-9: 0
SUM OF ALL RESPONSES TO PHQ9 QUESTIONS 1 & 2: 0

## 2019-10-16 ENCOUNTER — NURSE ONLY (OUTPATIENT)
Dept: FAMILY MEDICINE CLINIC | Age: 69
End: 2019-10-16

## 2019-10-16 DIAGNOSIS — Z23 NEED FOR INFLUENZA VACCINATION: Primary | ICD-10-CM

## 2019-10-16 PROCEDURE — 90688 IIV4 VACCINE SPLT 0.5 ML IM: CPT | Performed by: FAMILY MEDICINE

## 2019-10-16 PROCEDURE — G0008 ADMIN INFLUENZA VIRUS VAC: HCPCS | Performed by: FAMILY MEDICINE

## 2019-10-31 ENCOUNTER — TELEPHONE (OUTPATIENT)
Dept: FAMILY MEDICINE CLINIC | Age: 69
End: 2019-10-31

## 2019-11-05 ENCOUNTER — TELEPHONE (OUTPATIENT)
Dept: INTERNAL MEDICINE CLINIC | Age: 69
End: 2019-11-05

## 2019-12-14 DIAGNOSIS — E78.5 HYPERLIPIDEMIA, UNSPECIFIED HYPERLIPIDEMIA TYPE: ICD-10-CM

## 2019-12-14 DIAGNOSIS — I10 ESSENTIAL HYPERTENSION: ICD-10-CM

## 2019-12-14 DIAGNOSIS — I25.10 CORONARY ARTERY DISEASE INVOLVING NATIVE CORONARY ARTERY OF NATIVE HEART WITHOUT ANGINA PECTORIS: ICD-10-CM

## 2019-12-18 RX ORDER — ATORVASTATIN CALCIUM 40 MG/1
TABLET, FILM COATED ORAL
Qty: 90 TABLET | Refills: 4 | Status: SHIPPED | OUTPATIENT
Start: 2019-12-18 | End: 2021-03-09

## 2020-04-16 RX ORDER — ERGOCALCIFEROL 1.25 MG/1
CAPSULE ORAL
Qty: 12 CAPSULE | Refills: 3 | Status: SHIPPED | OUTPATIENT
Start: 2020-04-16 | End: 2021-03-19

## 2020-06-02 ENCOUNTER — OFFICE VISIT (OUTPATIENT)
Dept: INTERNAL MEDICINE CLINIC | Age: 70
End: 2020-06-02
Payer: MEDICARE

## 2020-06-02 VITALS
TEMPERATURE: 97.8 F | HEART RATE: 66 BPM | HEIGHT: 70 IN | WEIGHT: 245.2 LBS | SYSTOLIC BLOOD PRESSURE: 144 MMHG | BODY MASS INDEX: 35.1 KG/M2 | DIASTOLIC BLOOD PRESSURE: 74 MMHG

## 2020-06-02 PROCEDURE — 99214 OFFICE O/P EST MOD 30 MIN: CPT | Performed by: NURSE PRACTITIONER

## 2020-06-02 PROCEDURE — 93000 ELECTROCARDIOGRAM COMPLETE: CPT | Performed by: NURSE PRACTITIONER

## 2020-06-02 ASSESSMENT — PATIENT HEALTH QUESTIONNAIRE - PHQ9
2. FEELING DOWN, DEPRESSED OR HOPELESS: 0
SUM OF ALL RESPONSES TO PHQ QUESTIONS 1-9: 0
SUM OF ALL RESPONSES TO PHQ9 QUESTIONS 1 & 2: 0
SUM OF ALL RESPONSES TO PHQ QUESTIONS 1-9: 0
1. LITTLE INTEREST OR PLEASURE IN DOING THINGS: 0

## 2020-06-02 NOTE — PROGRESS NOTES
PSA Prostatic Specific Antigen; Future  - Urinalysis With Microscopic; Future  - PSA 3rd Generation    3. Coronary artery disease involving native heart without angina pectoris, unspecified vessel or lesion type    - EKG completed and reviewed  - Healthy heart diet recommended    4. Hyperlipidemia, unspecified hyperlipidemia type    - Lipid Panel; Future  - Continue atorvastatin 40 mg daily    5. Fatigue, unspecified type    - TSH With Reflex Ft4; Future  - Improved    Return in about 6 months (around 12/2/2020). An electronic signature was used to authenticate this note.     --RASHAD García - CNP on 6/19/2020 at 11:25 PM

## 2020-06-09 LAB
ABSOLUTE BASO #: 0.1 X10E9/L (ref 0–0.9)
ABSOLUTE EOS #: 0.3 X10E9/L (ref 0–0.4)
ABSOLUTE LYMPH #: 2.9 X10E9/L (ref 1–3.5)
ABSOLUTE MONO #: 0.7 X10E9/L (ref 0–0.9)
ABSOLUTE NEUT #: 3.2 X10E9/L (ref 1.5–6.6)
ALBUMIN SERPL-MCNC: 4.1 G/DL (ref 3.2–5.3)
ALK PHOSPHATASE: 116 U/L (ref 39–130)
ALT SERPL-CCNC: 26 U/L (ref 0–40)
ANION GAP SERPL CALCULATED.3IONS-SCNC: 12 MMOL/L (ref 5–15)
APPEARANCE: CLEAR
AST SERPL-CCNC: 25 U/L (ref 0–41)
BASOPHILS RELATIVE PERCENT: 0.7 %
BILIRUB SERPL-MCNC: 0.4 MG/DL (ref 0.3–1.2)
BILIRUBIN: NEGATIVE
BUN BLDV-MCNC: 16 MG/DL (ref 5–27)
CALCIUM SERPL-MCNC: 9.2 MG/DL (ref 8.5–10.5)
CHLORIDE BLD-SCNC: 107 MMOL/L (ref 98–109)
CHOLESTEROL/HDL RATIO: 3.2 (ref 1–5)
CHOLESTEROL: 92 MG/DL (ref 150–200)
CO2: 22 MMOL/L (ref 22–32)
COLOR: YELLOW
CREAT SERPL-MCNC: 0.98 MG/DL (ref 0.6–1.3)
EGFR AFRICAN AMERICAN: >60 ML/MIN/1.73SQ.M
EGFR IF NONAFRICAN AMERICAN: >60 ML/MIN/1.73SQ.M
EOSINOPHILS RELATIVE PERCENT: 4.8 %
GLUCOSE BLD-MCNC: NEGATIVE MG/DL
GLUCOSE: 97 MG/DL (ref 65–99)
HCT VFR BLD CALC: 42.4 % (ref 37–51)
HDLC SERPL-MCNC: 29 MG/DL
HEMOGLOBIN: 14.2 G/DL (ref 12.6–17.4)
KETONES, URINE: NEGATIVE MG/DL
LDL CHOLESTEROL CALCULATED: 32 MG/DL
LEUKOCYTE ESTERASE, URINE: NEGATIVE
LYMPHOCYTE %: 39.6 %
MCH RBC QN AUTO: 32 PG (ref 27–35)
MCHC RBC AUTO-ENTMCNC: 33.4 G/DL (ref 31–36)
MCV RBC AUTO: 96 FL (ref 81–101)
MONOCYTES # BLD: 10.1 %
NEUTROPHILS RELATIVE PERCENT: 44.8 %
NITRITE, URINE: NEGATIVE
OCCULT BLOOD,URINE: NEGATIVE
OTHER MICROSCOPIC ELEMENTS: ABNORMAL
PDW BLD-RTO: 13.8 % (ref 11.4–14.3)
PH: 5.5 (ref 5–8.5)
PLATELETS: 184 X10E9/L (ref 150–450)
PMV BLD AUTO: 8.7 FL (ref 7–12)
POTASSIUM SERPL-SCNC: 4.1 MMOL/L (ref 3.5–5)
PROTEIN, URINE: ABNORMAL MG/DL
PSA, ULTRASENSITIVE: 4.45 NG/ML (ref 0–4)
RBC: 3 /HPF (ref 0–5)
RBC: 4.44 X10E12/L (ref 3.9–5.8)
SODIUM BLD-SCNC: 141 MMOL/L (ref 134–146)
SP GRAVITY MISCELLANEOUS: 1.03 (ref 1–1.03)
TOTAL PROTEIN: 7.5 G/DL (ref 6–8)
TRIGL SERPL-MCNC: 156 MG/DL (ref 27–150)
TSH SERPL DL<=0.05 MIU/L-ACNC: 2.35 UIU/ML (ref 0.49–4.67)
UROBILINOGEN, URINE: <1.1 EU/DL
VLDLC SERPL CALC-MCNC: 31 MG/DL (ref 0–30)
WBC: 5 /HPF (ref 0–5)
WBC: 7.2 X10E9/L (ref 4.4–12)

## 2020-06-15 ENCOUNTER — TELEPHONE (OUTPATIENT)
Dept: INTERNAL MEDICINE CLINIC | Age: 70
End: 2020-06-15

## 2020-06-19 ASSESSMENT — ENCOUNTER SYMPTOMS
ABDOMINAL PAIN: 0
WHEEZING: 0
PHOTOPHOBIA: 0
SORE THROAT: 0
NAUSEA: 0
COUGH: 0
TROUBLE SWALLOWING: 0
DIARRHEA: 0
SINUS PAIN: 0
RHINORRHEA: 0
ABDOMINAL DISTENTION: 0
SHORTNESS OF BREATH: 0
BLOOD IN STOOL: 0
CONSTIPATION: 0
SINUS PRESSURE: 0

## 2020-06-22 ENCOUNTER — OFFICE VISIT (OUTPATIENT)
Dept: UROLOGY | Age: 70
End: 2020-06-22
Payer: MEDICARE

## 2020-06-22 VITALS — BODY MASS INDEX: 34.02 KG/M2 | WEIGHT: 243 LBS | TEMPERATURE: 98.1 F | HEIGHT: 71 IN

## 2020-06-22 PROCEDURE — 99203 OFFICE O/P NEW LOW 30 MIN: CPT | Performed by: UROLOGY

## 2020-06-26 RX ORDER — TAMSULOSIN HYDROCHLORIDE 0.4 MG/1
0.4 CAPSULE ORAL DAILY
Qty: 30 CAPSULE | Refills: 1 | Status: SHIPPED | OUTPATIENT
Start: 2020-06-26 | End: 2020-06-26 | Stop reason: CLARIF

## 2020-06-26 RX ORDER — TAMSULOSIN HYDROCHLORIDE 0.4 MG/1
0.4 CAPSULE ORAL DAILY
Qty: 30 CAPSULE | Refills: 1 | Status: SHIPPED | OUTPATIENT
Start: 2020-06-26 | End: 2020-08-06 | Stop reason: ALTCHOICE

## 2020-07-30 LAB — PSA, ULTRASENSITIVE: 3.81 NG/ML (ref 0–4)

## 2020-08-06 ENCOUNTER — OFFICE VISIT (OUTPATIENT)
Dept: UROLOGY | Age: 70
End: 2020-08-06
Payer: MEDICARE

## 2020-08-06 VITALS — WEIGHT: 245.4 LBS | TEMPERATURE: 97.1 F | BODY MASS INDEX: 34.35 KG/M2 | HEIGHT: 71 IN

## 2020-08-06 PROCEDURE — 99213 OFFICE O/P EST LOW 20 MIN: CPT | Performed by: UROLOGY

## 2020-08-06 NOTE — PROGRESS NOTES
Procedure Laterality Date    CARDIAC SURGERY      6 by pass    COLONOSCOPY  2014    Dr Abner Bass Left 2019    COLONOSCOPY POLYPECTOMY SNARE/COLD BIOPSY performed by Cecilio Jeong MD at [address] [facility]   [address]  2005    EXCISION OF AURAL MASS      NASAL POLYP SURGERY  2015    BIOPSY OF NASAL POLYP MIDDLE TURBINATE AND BIOPSY SEPTAL PERFORTATION    SKIN CANCER EXCISION  2018    on back    TONSILLECTOMY      as a child     Family History   Problem Relation Age of Onset    Heart Disease Mother     Cancer Father         throat    Diabetes Sister     Asthma Brother      Outpatient Medications Marked as Taking for the 20 encounter (Office Visit) with Sherrell Jose MD   Medication Sig Dispense Refill    vitamin D (ERGOCALCIFEROL) 1.25 MG (51268 UT) CAPS capsule TAKE 1 CAPSULE ONCE A WEEK 12 capsule 3    atorvastatin (LIPITOR) 40 MG tablet TAKE 1 TABLET DAILY 90 tablet 4    metoprolol tartrate (LOPRESSOR) 25 MG tablet TAKE 1 TABLET TWICE A  tablet 4    acetaminophen (TYLENOL ARTHRITIS PAIN) 650 MG extended release tablet Take 650 mg by mouth every 8 hours as needed for Pain      sodium chloride (OCEAN, BABY AYR) 0.65 % nasal spray 1 spray by Nasal route as needed for Congestion      aspirin 81 MG tablet Take 1 tablet by mouth daily Last dose 2015 for surgery 2015 (Patient taking differently: Take 81 mg by mouth 2 times daily Last dose 2015 for surgery 2015) 30 tablet 0    Multiple Vitamin (MULTI-VITAMIN) TABS Take by mouth daily          Patient has no known allergies.   Social History     Tobacco Use   Smoking Status Former Smoker    Packs/day: 1.50    Years: 30.00    Pack years: 45.00    Types: Cigarettes    Last attempt to quit: 6/3/2002    Years since quittin.1   Smokeless Tobacco Never Used       Social History     Substance and Sexual Activity   Alcohol Use Yes    Comment: rarely REVIEW OF SYSTEMS:  Constitutional: negative  Eyes: negative  Respiratory: negative  Cardiovascular: negative  Gastrointestinal: negative  Musculoskeletal: negative  Genitourinary: negative except for what is in HPI  Skin: negative   Neurological: negative  Hematological/Lymphatic: negative  Psychological: negative    Physical Exam:    This a 69 y.o. male   Vitals:    08/06/20 0754   Temp: 97.1 °F (36.2 °C)     Constitutional: Patient in no acute distress; Neuro: alert and oriented to person place and time. Psych: Mood and affect normal.        Assessment and Plan      1. Elevated PSA    2. Nephrolithiasis           Plan:       Doing well, PSA stable  Follow up annually with KUB and PSA  Return in about 1 year (around 8/6/2021).              Bashir Mcintyre MD  [facility]

## 2020-12-08 ENCOUNTER — OFFICE VISIT (OUTPATIENT)
Dept: INTERNAL MEDICINE CLINIC | Age: 70
End: 2020-12-08
Payer: MEDICARE

## 2020-12-08 VITALS
DIASTOLIC BLOOD PRESSURE: 82 MMHG | HEART RATE: 68 BPM | SYSTOLIC BLOOD PRESSURE: 136 MMHG | WEIGHT: 246.8 LBS | HEIGHT: 71 IN | BODY MASS INDEX: 34.55 KG/M2 | TEMPERATURE: 97 F

## 2020-12-08 PROCEDURE — 99214 OFFICE O/P EST MOD 30 MIN: CPT | Performed by: NURSE PRACTITIONER

## 2020-12-08 PROCEDURE — 93000 ELECTROCARDIOGRAM COMPLETE: CPT | Performed by: NURSE PRACTITIONER

## 2020-12-08 PROCEDURE — 3288F FALL RISK ASSESSMENT DOCD: CPT | Performed by: NURSE PRACTITIONER

## 2020-12-08 RX ORDER — ASPIRIN 81 MG/1
81 TABLET ORAL 2 TIMES DAILY
COMMUNITY

## 2020-12-08 ASSESSMENT — ENCOUNTER SYMPTOMS
SHORTNESS OF BREATH: 0
DIARRHEA: 0
TROUBLE SWALLOWING: 0
SORE THROAT: 0
ABDOMINAL DISTENTION: 0
SINUS PAIN: 0
CONSTIPATION: 0
RHINORRHEA: 0
WHEEZING: 0
NAUSEA: 0
SINUS PRESSURE: 0
COUGH: 0
ABDOMINAL PAIN: 0
PHOTOPHOBIA: 0
BLOOD IN STOOL: 0

## 2020-12-08 NOTE — PROGRESS NOTES
2020     Carmen Doctor (:  1950) is a 69 y.o. male, here for evaluation of the following medical concerns:  CAD, HTN, Hyperlipidemia, fatigue, and urinary frequency.      I last seen Ned 6 months ago. He follows routinely with Dr. Sameera Madrigal. No ER visits or hospitalizations since last visit.     CAD/HTN-  He reports he is taking medications as instructed, with no medication side effects. He does not perform home BP monitoring. Reports occasional shortness of breath on exertion which is not new and is unchanged. Denies chest pain, swelling of ankles, palpitations, and orthostatic dizziness or lightheadedness. /82 today in office. Stress test in 2019, normal. Cardiac catheterization completed in  post CABG. EKG completed and reviewed.     Hyperlipidemia-  He states he is taking atorvastatin 40 mg daily as ordered. Lifestyle modifications including heart healthy diet, weight loss and exercise are/are not being followed.  Last lipid profile shows LDL 32 < 100 (<70 with CVD), triglycerides 156 <150, HDL 29 >40 (male, HDL >50 female) in 2020.     The ASCVD Risk score (Mattla Mercedez., et al., 2013) failed to calculate for the following reasons: The valid total cholesterol range is 130 to 320 mg/dL     Fatigue-  Improved.      Urinary Frequency-  Urinary frequency has improved. He denies any dysuria or hematuria. No fevers. No urinary incontinence. PSA 3.81 on . Follows with urology routinely. Review of Systems   Constitutional: Negative for chills, fatigue and fever. HENT: Negative for congestion, rhinorrhea, sinus pressure, sinus pain, sore throat, tinnitus and trouble swallowing. Eyes: Negative for photophobia and visual disturbance. Respiratory: Negative for cough, shortness of breath and wheezing. Cardiovascular: Negative for chest pain, palpitations and leg swelling.    Gastrointestinal: Negative for abdominal distention, abdominal pain, blood in stool, constipation, diarrhea and nausea. Genitourinary: Negative for difficulty urinating, dysuria, frequency and hematuria. Musculoskeletal: Negative for arthralgias and myalgias. Skin: Negative for rash and wound. Neurological: Negative for dizziness, light-headedness and headaches. Psychiatric/Behavioral: Negative for dysphoric mood and sleep disturbance. The patient is not nervous/anxious. Prior to Visit Medications    Medication Sig Taking? Authorizing Provider   aspirin 81 MG EC tablet Take 81 mg by mouth 2 times daily  Yes Historical Provider, MD   vitamin D (ERGOCALCIFEROL) 1.25 MG (37302 UT) CAPS capsule TAKE 1 CAPSULE ONCE A WEEK Yes RASHAD Siddiqui CNP   atorvastatin (LIPITOR) 40 MG tablet TAKE 1 TABLET DAILY Yes RASHAD Siddiqui CNP   metoprolol tartrate (LOPRESSOR) 25 MG tablet TAKE 1 TABLET TWICE A DAY Yes RASHAD Siddiqui CNP   acetaminophen (TYLENOL ARTHRITIS PAIN) 650 MG extended release tablet Take 650 mg by mouth every 8 hours as needed for Pain Yes Historical Provider, MD   Multiple Vitamin (MULTI-VITAMIN) TABS Take by mouth daily  Yes Historical Provider, MD   sodium chloride (OCEAN, BABY AYR) 0.65 % nasal spray 1 spray by Nasal route as needed for Congestion  Historical Provider, MD        Social History     Tobacco Use    Smoking status: Former Smoker     Packs/day: 1.50     Years: 30.00     Pack years: 45.00     Types: Cigarettes     Last attempt to quit: 6/3/2002     Years since quittin.5    Smokeless tobacco: Never Used   Substance Use Topics    Alcohol use: Yes     Comment: rarely        Vitals:    20 0855   BP: 136/82   Site: Right Upper Arm   Cuff Size: Medium Adult   Pulse: 68   Temp: 97 °F (36.1 °C)   TempSrc: Temporal   Weight: 246 lb 12.8 oz (111.9 kg)   Height: 5' 11\" (1.803 m)     Estimated body mass index is 34.42 kg/m² as calculated from the following:    Height as of this encounter: 5' 11\" (1.803 m).     Weight as of this encounter: 246 lb 12.8 oz (111.9 kg). Physical Exam  Constitutional:       General: He is not in acute distress. Appearance: Normal appearance. He is well-developed. He is not ill-appearing. HENT:      Head: Normocephalic and atraumatic. Right Ear: Tympanic membrane, ear canal and external ear normal.      Left Ear: Tympanic membrane, ear canal and external ear normal.      Nose: Nose normal. No congestion or rhinorrhea. Mouth/Throat:      Mouth: Mucous membranes are moist.      Pharynx: Oropharynx is clear. No oropharyngeal exudate or posterior oropharyngeal erythema. Eyes:      Extraocular Movements: Extraocular movements intact. Conjunctiva/sclera: Conjunctivae normal.      Pupils: Pupils are equal, round, and reactive to light. Neck:      Musculoskeletal: Normal range of motion and neck supple. Thyroid: No thyromegaly. Vascular: No JVD. Cardiovascular:      Rate and Rhythm: Normal rate and regular rhythm. Pulses: Normal pulses. Heart sounds: Normal heart sounds. No murmur. No friction rub. No gallop. Pulmonary:      Effort: Pulmonary effort is normal. No respiratory distress. Breath sounds: Normal breath sounds. No wheezing, rhonchi or rales. Abdominal:      General: Bowel sounds are normal. There is no distension. Palpations: Abdomen is soft. Tenderness: There is no abdominal tenderness. Musculoskeletal: Normal range of motion. Right lower leg: No edema. Left lower leg: No edema. Lymphadenopathy:      Cervical: No cervical adenopathy. Skin:     General: Skin is warm and dry. Findings: No erythema or rash. Neurological:      General: No focal deficit present. Mental Status: He is alert and oriented to person, place, and time. Mental status is at baseline. Motor: No weakness.       Coordination: Coordination normal.      Gait: Gait normal.   Psychiatric:         Mood and Affect: Mood normal.         Behavior: Behavior normal.         Thought Content: Thought content normal.         Judgment: Judgment normal.         ASSESSMENT/PLAN:    1. Essential hypertension    - EKG 12 Lead  - CBC; Future  - Comprehensive Metabolic Panel; Future  - Medications reviewed, continue current regimen  - Monitor periodically at home, call with readings > 150/90 or < 100/50    2. Other fatigue    - TSH With Reflex Ft4; Future    3. Coronary artery disease involving autologous vein coronary bypass graft without angina pectoris    - aspirin 81 MG EC tablet; Take 81 mg by mouth 2 times daily     4. Hyperlipidemia, unspecified hyperlipidemia type    - Lipid Panel; Future  - Continue atorvastatin 40 mg daily    5. Urinary frequency    - Follow with urology as previously scheduled      Return in about 6 months (around 6/8/2021). An electronic signature was used to authenticate this note.     --RASHAD Campbell - CNP on 12/8/2020 at 10:09 AM

## 2021-02-03 ENCOUNTER — HOSPITAL ENCOUNTER (EMERGENCY)
Age: 71
Discharge: HOME | End: 2021-02-03
Attending: EMERGENCY MEDICINE
Payer: MEDICARE

## 2021-02-03 VITALS
TEMPERATURE: 98.1 F | OXYGEN SATURATION: 97 % | BODY MASS INDEX: 33.6 KG/M2 | RESPIRATION RATE: 18 BRPM | HEART RATE: 65 BPM | DIASTOLIC BLOOD PRESSURE: 88 MMHG | HEIGHT: 71 IN | WEIGHT: 240 LBS | SYSTOLIC BLOOD PRESSURE: 187 MMHG

## 2021-02-03 DIAGNOSIS — J03.90 ACUTE TONSILLITIS, UNSPECIFIED ETIOLOGY: Primary | ICD-10-CM

## 2021-02-03 DIAGNOSIS — R03.0 ELEVATED BLOOD PRESSURE READING: ICD-10-CM

## 2021-02-03 PROCEDURE — 99213 OFFICE O/P EST LOW 20 MIN: CPT | Performed by: EMERGENCY MEDICINE

## 2021-02-03 PROCEDURE — 99213 OFFICE O/P EST LOW 20 MIN: CPT

## 2021-02-03 RX ORDER — LORATADINE 10 MG
1 CAPSULE ORAL 4 TIMES DAILY PRN
Qty: 30 CAPSULE | Refills: 1 | Status: SHIPPED | OUTPATIENT
Start: 2021-02-03 | End: 2021-02-11 | Stop reason: ALTCHOICE

## 2021-02-03 RX ORDER — MULTIVIT WITH MINERALS/LUTEIN
250 TABLET ORAL DAILY
COMMUNITY
End: 2022-02-28

## 2021-02-03 RX ORDER — AMOXICILLIN 500 MG/1
500 CAPSULE ORAL 3 TIMES DAILY
Qty: 21 CAPSULE | Refills: 0 | Status: SHIPPED | OUTPATIENT
Start: 2021-02-03 | End: 2021-02-10

## 2021-02-03 ASSESSMENT — ENCOUNTER SYMPTOMS
SINUS PRESSURE: 0
TROUBLE SWALLOWING: 0
WHEEZING: 0
VOICE CHANGE: 0
EYE DISCHARGE: 0
BACK PAIN: 0
EYE REDNESS: 0
DIARRHEA: 0
COUGH: 0
RHINORRHEA: 1
STRIDOR: 0
EYE PAIN: 0
ABDOMINAL PAIN: 0
VOMITING: 0
NAUSEA: 0
SORE THROAT: 1
SHORTNESS OF BREATH: 0

## 2021-02-03 NOTE — LETTER
[facility]  [address] [place] [id number]  Phone: [phone]               February 3, 2021    Patient: Stone Hernandez   YOB: 1950   Date of Visit: 2/3/2021       To Whom It May Concern:    Brian Mijares was seen and treated in our emergency department on 2/3/2021. He may return to work on February 5, 2021.   No work February 2 to February 4, 2021      Sincerely,       Dong Brandon MD         Signature:__________________________________

## 2021-02-03 NOTE — ED PROVIDER NOTES
[address] [facility] Encounter      [facility]       Chief Complaint   Patient presents with    Pharyngitis    Otalgia       Nurses Notes reviewed and I agree except as noted in the HPI. HISTORY OF PRESENT ILLNESS   Jory Turner is a 69 y.o. male who presents with 36-hour history of increasingly severe sore throat, bilateral ear pressure, left more than right, bilateral tinnitus, postnasal drainage and congestion. He rates throat pain at 5 out of 10 in severity. Recently completed second dose of COVID-19 immunization. No chest pain, shortness of breath, fever, vomiting, dizziness, syncope, rash, diarrhea,  symptoms. Has tonsils. History of CAD and hypertension. Quit smoking. .  No history of diabetes or asthma. REVIEW OF SYSTEMS     Review of Systems   Constitutional: Positive for appetite change. Negative for chills, fatigue, fever and unexpected weight change. HENT: Positive for congestion, ear pain, postnasal drip, rhinorrhea, sore throat and tinnitus. Negative for ear discharge, sinus pressure, sneezing, trouble swallowing and voice change. Eyes: Negative for pain, discharge and redness. Respiratory: Negative for cough, shortness of breath, wheezing and stridor. Cardiovascular: Negative for chest pain and leg swelling. Gastrointestinal: Negative for abdominal pain, diarrhea, nausea and vomiting. Genitourinary: Negative for dysuria, frequency, hematuria and urgency. Musculoskeletal: Negative for arthralgias, back pain, myalgias and neck pain. Skin: Negative for rash. Neurological: Negative for dizziness, syncope, weakness and headaches. Hematological: Negative for adenopathy. Psychiatric/Behavioral: Negative for behavioral problems, confusion, sleep disturbance and suicidal ideas. The patient is not nervous/anxious. All other systems reviewed and are negative.       PAST MEDICAL HISTORY         Diagnosis Date    Arthritis Patient  reports that he quit smoking about 18 years ago. His smoking use included cigarettes. He has a 45.00 pack-year smoking history. He has never used smokeless tobacco. He reports current alcohol use. He reports that he does not use drugs. PHYSICAL EXAM     ED TRIAGE VITALS  BP: (!) 187/88(\"it is always that high\" Encourage to call PCP), Temp: 98.1 °F (36.7 °C), Pulse: 65, Resp: 18, SpO2: 97 %  Physical Exam  Vitals signs and nursing note reviewed. Constitutional:       General: He is not in acute distress. Appearance: He is well-developed. He is not ill-appearing. Comments: Moist membranes, normal airway   HENT:      Head: Normocephalic and atraumatic. Right Ear: External ear normal. Tympanic membrane is injected. Left Ear: External ear normal. Tympanic membrane is injected. Ears:      Comments: Clear fluid both TMs     Nose: Nose normal.      Mouth/Throat:      Pharynx: Posterior oropharyngeal erythema present. No oropharyngeal exudate. Tonsils: No tonsillar exudate. 2+ on the right. 2+ on the left. Comments: Pharyngeal erythema no exudate or abscess  Eyes:      General: No scleral icterus. Right eye: No discharge. Left eye: No discharge. Extraocular Movements:      Right eye: Normal extraocular motion. Left eye: Normal extraocular motion. Conjunctiva/sclera: Conjunctivae normal.      Pupils: Pupils are equal, round, and reactive to light. Comments: Conjunctiva clear   Neck:      Musculoskeletal: Normal range of motion. Thyroid: No thyromegaly. Vascular: No carotid bruit or JVD. Comments: No meningismus  Cardiovascular:      Rate and Rhythm: Normal rate and regular rhythm. Pulses: Normal pulses. Heart sounds: Normal heart sounds, S1 normal and S2 normal. No murmur. No friction rub. No gallop. Pulmonary:      Effort: Pulmonary effort is normal. No tachypnea or respiratory distress. Breath sounds: Normal breath sounds. No stridor. No decreased breath sounds, wheezing, rhonchi or rales. Comments: No cough, lungs clear  Chest:      Chest wall: No tenderness. Abdominal:      General: Bowel sounds are normal. There is no distension. Palpations: Abdomen is soft. There is no mass. Tenderness: There is no abdominal tenderness. There is no right CVA tenderness, left CVA tenderness, guarding or rebound. Comments: Soft nontender   Musculoskeletal: Normal range of motion. General: No tenderness. Right lower leg: Normal.      Left lower leg: Normal.   Lymphadenopathy:      Cervical: Cervical adenopathy present. Right cervical: Superficial cervical adenopathy present. No deep cervical adenopathy. Left cervical: Superficial cervical adenopathy present. No deep cervical adenopathy. Skin:     General: Skin is warm and dry. Findings: No erythema or rash. Comments: No rash or bruising   Neurological:      Mental Status: He is alert and oriented to person, place, and time. Cranial Nerves: No cranial nerve deficit. Motor: No abnormal muscle tone. Coordination: Coordination normal.      Deep Tendon Reflexes: Reflexes are normal and symmetric. Reflexes normal.      Comments: Appropriate, no focal finding   Psychiatric:         Behavior: Behavior normal.         Thought Content: Thought content normal.         Judgment: Judgment normal.         DIAGNOSTIC RESULTS   Labs: No results found for this visit on 02/03/21. IMAGING:  No orders to display     URGENT CARE COURSE:     Vitals:    02/03/21 1127 02/03/21 1151   BP: (!) 187/73 (!) 187/88   Pulse: 65    Resp: 18    Temp: 98.1 °F (36.7 °C)    TempSrc: Oral    SpO2: 97%    Weight: 240 lb (108.9 kg)    Height: 5' 11\" (1.803 m)        Medications - No data to display  PROCEDURES:  None  FINALIMPRESSION      1. Acute tonsillitis, unspecified etiology    2.  Elevated blood pressure reading DISPOSITION/PLAN   DISPOSITION Decision To Discharge 02/03/2021 11:42:38 AM  Nontoxic, well-hydrated, normal airway. No airway abscess or epiglottitis, sepsis, CNS infection, pneumonia, hypoxia, bronchospasm. Patient has acute tonsillitis and serous otitis. Will treat with Amoxil, Coricidin HBP, Tylenol, increased oral clear liquids, rest.  Patient to recheck with PCP in 5 days if problems persist, and he understands to go to ED if worse.   PATIENT REFERRED TO:  Hugh Cantu MD  [address] [facility] [address]  [phone]    Schedule an appointment as soon as possible for a visit in 5 days  Recheck in office if problems persist, go to emergency if worse    DISCHARGE MEDICATIONS:  Discharge Medication List as of 2/3/2021 11:47 AM      START taking these medications    Details   amoxicillin (AMOXIL) 500 MG capsule Take 1 capsule by mouth 3 times daily for 7 days, Disp-21 capsule, R-0Print      Dextromethorphan-guaiFENesin (CORICIDIN HBP CONGESTION/COUGH)  MG CAPS Take 1 tablet by mouth 4 times daily as needed (Ear pain and pressure, congestion, postnasal drainage, sinus pain and pressure, cough), Disp-30 capsule, R-1Print           Discharge Medication List as of 2/3/2021 11:47 AM          MD Mirna Levy MD  02/03/21 [address] MD  02/06/21 [address] Oumar Villalba MD  02/06/21 [address] Oumar Villalba MD  02/06/21 1742

## 2021-02-08 ENCOUNTER — TELEPHONE (OUTPATIENT)
Dept: FAMILY MEDICINE CLINIC | Age: 71
End: 2021-02-08

## 2021-02-11 ENCOUNTER — OFFICE VISIT (OUTPATIENT)
Dept: FAMILY MEDICINE CLINIC | Age: 71
End: 2021-02-11

## 2021-02-11 VITALS
RESPIRATION RATE: 16 BRPM | HEIGHT: 71 IN | HEART RATE: 84 BPM | WEIGHT: 250 LBS | DIASTOLIC BLOOD PRESSURE: 88 MMHG | BODY MASS INDEX: 35 KG/M2 | SYSTOLIC BLOOD PRESSURE: 158 MMHG | TEMPERATURE: 97 F

## 2021-02-11 DIAGNOSIS — I10 ESSENTIAL HYPERTENSION: Primary | ICD-10-CM

## 2021-02-11 DIAGNOSIS — E78.5 HYPERLIPIDEMIA, UNSPECIFIED HYPERLIPIDEMIA TYPE: ICD-10-CM

## 2021-02-11 DIAGNOSIS — I25.810 CORONARY ARTERY DISEASE INVOLVING AUTOLOGOUS VEIN CORONARY BYPASS GRAFT WITHOUT ANGINA PECTORIS: ICD-10-CM

## 2021-02-11 DIAGNOSIS — D12.6 ADENOMATOUS POLYP OF COLON, UNSPECIFIED PART OF COLON: ICD-10-CM

## 2021-02-11 PROCEDURE — 99213 OFFICE O/P EST LOW 20 MIN: CPT | Performed by: FAMILY MEDICINE

## 2021-02-11 RX ORDER — LOSARTAN POTASSIUM 50 MG/1
50 TABLET ORAL DAILY
Qty: 30 TABLET | Refills: 1 | Status: SHIPPED | OUTPATIENT
Start: 2021-02-11 | End: 2021-04-16 | Stop reason: SDUPTHER

## 2021-02-11 ASSESSMENT — PATIENT HEALTH QUESTIONNAIRE - PHQ9
SUM OF ALL RESPONSES TO PHQ QUESTIONS 1-9: 0
1. LITTLE INTEREST OR PLEASURE IN DOING THINGS: 0
SUM OF ALL RESPONSES TO PHQ QUESTIONS 1-9: 0

## 2021-02-11 ASSESSMENT — ENCOUNTER SYMPTOMS
CONSTIPATION: 0
COUGH: 0
NAUSEA: 0
BACK PAIN: 0
CHEST TIGHTNESS: 0
ABDOMINAL PAIN: 0
EYE PAIN: 0
SHORTNESS OF BREATH: 0
BLOOD IN STOOL: 0
TROUBLE SWALLOWING: 0
SORE THROAT: 0

## 2021-02-11 NOTE — PROGRESS NOTES
Subjective:      Patient ID: Carolyn Wen is a 69 y.o. male. Pharyngitis and better    But  bp  Up      ashd  Stable     Hypertension  This is a chronic problem. The current episode started more than 1 year ago. The problem has been resolved since onset. The problem is controlled. Pertinent negatives include no anxiety, chest pain, headaches, palpitations or shortness of breath. The current treatment provides significant improvement. There are no compliance problems. Past Medical History:   Diagnosis Date    Arthritis     ASHD (arteriosclerotic heart disease)     CAD (coronary artery disease)     Status post bypass    [facility]    History of blood transfusion 2005    ? ???? with by pass? ??    Hyperlipidemia     Hypertension     Mild carotid artery disease ([facility] [address] 2013     Past Surgical History:   Procedure Laterality Date    CARDIAC SURGERY  2005    6 by pass    COLONOSCOPY  12/2019    Dr Toribio Running  colon polyps 8//2014 9-2019    COLONOSCOPY Left 09/13/2019    COLONOSCOPY POLYPECTOMY SNARE/COLD BIOPSY performed by Francisca Grimes MD at [address] [facility]  11/2005    EXCISION OF AURAL MASS      NASAL POLYP SURGERY  06/05/2015    BIOPSY OF NASAL POLYP MIDDLE TURBINATE AND BIOPSY SEPTAL [address]    SKIN CANCER EXCISION  2018    on back    TONSILLECTOMY      as a child     Social History     Socioeconomic History    Marital status:      Spouse name: Not on file    Number of children: Not on file    Years of education: Not on file    Highest education level: Not on file   Occupational History    Not on file   Social Needs    Financial resource strain: Not on file    Food insecurity     Worry: Not on file     Inability: Not on file    Transportation needs     Medical: Not on file     Non-medical: Not on file   Tobacco Use    Smoking status: Former Smoker     Packs/day: 1.50 Years: 30.00     Pack years: 45.00     Types: Cigarettes     Quit date: 6/3/2002     Years since quittin.7    Smokeless tobacco: Never Used   Substance and Sexual Activity    Alcohol use: Yes     Comment: rarely    Drug use: No    Sexual activity: Not Currently     Partners: Female   Lifestyle    Physical activity     Days per week: Not on file     Minutes per session: Not on file    Stress: Not on file   Relationships    Social connections     Talks on phone: Not on file     Gets together: Not on file     Attends Restoration service: Not on file     Active member of club or organization: Not on file     Attends meetings of clubs or organizations: Not on file     Relationship status: Not on file    Intimate partner violence     Fear of current or ex partner: Not on file     Emotionally abused: Not on file     Physically abused: Not on file     Forced sexual activity: Not on file   Other Topics Concern    Not on file   Social History Narrative    Not on file     Family History   Problem Relation Age of Onset    Heart Disease Mother     Cancer Father         throat    Diabetes Sister     Asthma Brother      Review of Systems   Constitutional: Negative for fatigue and fever. HENT: Negative for congestion, ear pain, postnasal drip, sore throat and trouble swallowing. Eyes: Negative for pain. Respiratory: Negative for cough, chest tightness and shortness of breath. Cardiovascular: Negative for chest pain, palpitations and leg swelling. Gastrointestinal: Negative for abdominal pain, blood in stool, constipation and nausea. Genitourinary: Negative for difficulty urinating, frequency and urgency. Musculoskeletal: Negative for arthralgias, back pain, joint swelling and neck stiffness. Skin: Negative for rash. Neurological: Negative for dizziness, weakness and headaches. Hematological: Negative for adenopathy. Does not bruise/bleed easily. Psychiatric/Behavioral: Negative for behavioral problems, dysphoric mood and sleep disturbance. BP (!) 164/94 (Site: Right Upper Arm, Position: Sitting, Cuff Size: Medium Adult)   Pulse 84   Temp 97 °F (36.1 °C) (Oral)   Resp 16   Ht 5' 11\" (1.803 m)   Wt 250 lb (113.4 kg)   BMI 34.87 kg/m²   Objective:   Physical Exam  Vitals signs and nursing note reviewed. Constitutional:       Appearance: He is well-developed. HENT:      Head: Normocephalic and atraumatic. Right Ear: External ear normal.      Left Ear: External ear normal.      Nose: Nose normal.   Eyes:      Conjunctiva/sclera: Conjunctivae normal.      Pupils: Pupils are equal, round, and reactive to light. Comments: Fundi nl   Neck:      Musculoskeletal: Normal range of motion and neck supple. Thyroid: No thyromegaly. Cardiovascular:      Rate and Rhythm: Normal rate and regular rhythm. Heart sounds: Normal heart sounds. Pulmonary:      Effort: Pulmonary effort is normal.      Breath sounds: Normal breath sounds. No wheezing or rales. Abdominal:      General: Bowel sounds are normal.      Palpations: Abdomen is soft. There is no mass. Tenderness: There is no abdominal tenderness. Musculoskeletal: Normal range of motion. Lymphadenopathy:      Cervical: No cervical adenopathy. Skin:     General: Skin is warm and dry. Findings: No rash. Neurological:      Mental Status: He is alert and oriented to person, place, and time. Cranial Nerves: No cranial nerve deficit. Deep Tendon Reflexes: Reflexes are normal and symmetric. Assessment:       Diagnosis Orders   1. Essential hypertension  losartan (COZAAR) 50 MG tablet   2. Coronary artery disease involving autologous vein coronary bypass graft without angina pectoris     3. Hyperlipidemia, unspecified hyperlipidemia type     4.  Adenomatous polyp of colon, unspecified part of colon           Plan:      Current Outpatient Medications Medication Sig Dispense Refill    losartan (COZAAR) 50 MG tablet Take 1 tablet by mouth daily 30 tablet 1    Ascorbic Acid (VITAMIN C) 250 MG tablet Take 250 mg by mouth daily      aspirin 81 MG EC tablet Take 81 mg by mouth 2 times daily       vitamin D (ERGOCALCIFEROL) 1.25 MG (67974 UT) CAPS capsule TAKE 1 CAPSULE ONCE A WEEK 12 capsule 3    atorvastatin (LIPITOR) 40 MG tablet TAKE 1 TABLET DAILY 90 tablet 4    metoprolol tartrate (LOPRESSOR) 25 MG tablet TAKE 1 TABLET TWICE A  tablet 4    acetaminophen (TYLENOL ARTHRITIS PAIN) 650 MG extended release tablet Take 650 mg by mouth every 8 hours as needed for Pain      sodium chloride (OCEAN, BABY AYR) 0.65 % nasal spray 1 spray by Nasal route as needed for Congestion      Multiple Vitamin (MULTI-VITAMIN) TABS Take by mouth daily        No current facility-administered medications for this visit. No orders of the defined types were placed in this encounter.             Start  Losartan   50  Mg   One  A day  And  Then see in  2  Months     And do bp  Check  in  2 weeks     Get  Vascular  Screen     Mariam Rice MD

## 2021-02-11 NOTE — PROGRESS NOTES
No components found for: CHLPL  Lab Results   Component Value Date    TRIG 156 (H) 06/09/2020     Lab Results   Component Value Date    HDL 29 (L) 06/09/2020     Lab Results   Component Value Date    LDLCALC 32 06/09/2020     Lab Results   Component Value Date    LABVLDL 31 (H) 06/09/2020       Lab Results   Component Value Date    ALT 26 06/09/2020    AST 25 06/09/2020    ALKPHOS 116 06/09/2020    BILITOT 0.4 06/09/2020    BILITOT Negative 06/09/2020           Is patient currently taking any cholesterol medications? Yes   If yes, see med list as above    Is the patient reporting any side effects of cholesterol medications? No    Is the patient taking any over the counter medications? Yes   If yes, see med list as above    Is the patient taking a daily aspirin? Yes      Patient Self-Management Goal for Chronic Condition  Goal: I will take all medications as prescribed by my doctor, and I will call the office if I am having any medication problems. Barriers to success: none  Plan for overcoming my barriers: N/A     Confidence: 10/10  Date goal set: 2/11/21  Date goal attained:     Have you seen any other physician or provider since your last visit no    Have you had any other diagnostic tests since your last visit? no    Have you changed or stopped any medications since your last visit including any over-the-counter medicines, vitamins, or herbal medicines? no     Are you taking all your prescribed medications?  Yes    If NO, why?

## 2021-02-23 ENCOUNTER — HOSPITAL ENCOUNTER (OUTPATIENT)
Dept: INTERVENTIONAL RADIOLOGY/VASCULAR | Age: 71
Discharge: HOME | End: 2021-02-23

## 2021-02-23 ENCOUNTER — NURSE ONLY (OUTPATIENT)
Dept: FAMILY MEDICINE CLINIC | Age: 71
End: 2021-02-23

## 2021-02-23 VITALS — SYSTOLIC BLOOD PRESSURE: 130 MMHG | HEART RATE: 74 BPM | DIASTOLIC BLOOD PRESSURE: 82 MMHG

## 2021-02-23 DIAGNOSIS — Z13.9 ENCOUNTER FOR SCREENING: ICD-10-CM

## 2021-02-23 PROCEDURE — 9900000021 US VASCULAR SCREENING

## 2021-02-24 ENCOUNTER — TELEPHONE (OUTPATIENT)
Dept: FAMILY MEDICINE CLINIC | Age: 71
End: 2021-02-24

## 2021-02-24 NOTE — TELEPHONE ENCOUNTER
----- Message from Dee Tomas MD sent at 2/24/2021  6:57 AM EST -----  Call as screen all normal and no concerns

## 2021-03-09 DIAGNOSIS — I25.10 CORONARY ARTERY DISEASE INVOLVING NATIVE CORONARY ARTERY OF NATIVE HEART WITHOUT ANGINA PECTORIS: ICD-10-CM

## 2021-03-09 DIAGNOSIS — E78.5 HYPERLIPIDEMIA, UNSPECIFIED HYPERLIPIDEMIA TYPE: ICD-10-CM

## 2021-03-09 DIAGNOSIS — I10 ESSENTIAL HYPERTENSION: ICD-10-CM

## 2021-03-09 RX ORDER — ATORVASTATIN CALCIUM 40 MG/1
TABLET, FILM COATED ORAL
Qty: 90 TABLET | Refills: 3 | Status: SHIPPED | OUTPATIENT
Start: 2021-03-09 | End: 2022-03-05

## 2021-03-18 DIAGNOSIS — E55.9 VITAMIN D DEFICIENCY: ICD-10-CM

## 2021-03-19 RX ORDER — ERGOCALCIFEROL 1.25 MG/1
CAPSULE ORAL
Qty: 12 CAPSULE | Refills: 3 | Status: SHIPPED | OUTPATIENT
Start: 2021-03-19 | End: 2022-02-21

## 2021-04-16 ENCOUNTER — OFFICE VISIT (OUTPATIENT)
Dept: FAMILY MEDICINE CLINIC | Age: 71
End: 2021-04-16

## 2021-04-16 ENCOUNTER — TELEPHONE (OUTPATIENT)
Dept: FAMILY MEDICINE CLINIC | Age: 71
End: 2021-04-16

## 2021-04-16 VITALS
HEIGHT: 71 IN | TEMPERATURE: 97.2 F | HEART RATE: 72 BPM | RESPIRATION RATE: 10 BRPM | SYSTOLIC BLOOD PRESSURE: 132 MMHG | DIASTOLIC BLOOD PRESSURE: 84 MMHG | BODY MASS INDEX: 35.07 KG/M2 | WEIGHT: 250.5 LBS

## 2021-04-16 DIAGNOSIS — I10 ESSENTIAL HYPERTENSION: ICD-10-CM

## 2021-04-16 DIAGNOSIS — Z95.1 S/P CABG X 6: ICD-10-CM

## 2021-04-16 DIAGNOSIS — E78.5 HYPERLIPIDEMIA, UNSPECIFIED HYPERLIPIDEMIA TYPE: ICD-10-CM

## 2021-04-16 DIAGNOSIS — I25.810 CORONARY ARTERY DISEASE INVOLVING AUTOLOGOUS VEIN CORONARY BYPASS GRAFT WITHOUT ANGINA PECTORIS: ICD-10-CM

## 2021-04-16 DIAGNOSIS — I10 ESSENTIAL HYPERTENSION: Primary | ICD-10-CM

## 2021-04-16 PROCEDURE — 99213 OFFICE O/P EST LOW 20 MIN: CPT | Performed by: FAMILY MEDICINE

## 2021-04-16 RX ORDER — LOSARTAN POTASSIUM 50 MG/1
50 TABLET ORAL DAILY
Qty: 90 TABLET | Refills: 1 | Status: SHIPPED | OUTPATIENT
Start: 2021-04-16 | End: 2021-04-16 | Stop reason: SDUPTHER

## 2021-04-16 RX ORDER — LOSARTAN POTASSIUM 50 MG/1
50 TABLET ORAL DAILY
Qty: 14 TABLET | Refills: 0 | Status: SHIPPED | OUTPATIENT
Start: 2021-04-16 | End: 2021-10-13

## 2021-04-16 ASSESSMENT — ENCOUNTER SYMPTOMS
CONSTIPATION: 0
COUGH: 0
SORE THROAT: 0
ORTHOPNEA: 0
EYE PAIN: 0
TROUBLE SWALLOWING: 0
ABDOMINAL PAIN: 0
NAUSEA: 0
SHORTNESS OF BREATH: 0
BACK PAIN: 0
CHEST TIGHTNESS: 0
BLOOD IN STOOL: 0

## 2021-04-16 NOTE — PROGRESS NOTES
Subjective:      Patient ID: Estefany Horn is a 69 y.o. male. Elevated     psa  Stable      ashd  Stable     Hyperlipidemia  This is a chronic problem. The current episode started more than 1 year ago. The problem is controlled. Pertinent negatives include no chest pain or shortness of breath. Current antihyperlipidemic treatment includes statins. The current treatment provides significant improvement of lipids. There are no compliance problems. Hypertension  This is a chronic problem. The current episode started more than 1 year ago. The problem has been resolved since onset. The problem is controlled. Pertinent negatives include no chest pain, headaches, orthopnea, palpitations, peripheral edema or shortness of breath. The current treatment provides significant improvement. There are no compliance problems. Past Medical History:   Diagnosis Date    Arthritis     ASHD (arteriosclerotic heart disease)     CAD (coronary artery disease)     Status post bypass    Colon polyps 2009      colonoscopy  tubular  adenoma   [facility]    History of blood transfusion 2005    ? ???? with by pass? ??    Hyperlipidemia     Hypertension     Mild carotid artery disease ([facility] [address] 2013       Review of Systems   Constitutional: Negative for fatigue and fever. HENT: Negative for congestion, ear pain, postnasal drip, sore throat and trouble swallowing. Eyes: Negative for pain. Respiratory: Negative for cough, chest tightness and shortness of breath. Cardiovascular: Negative for chest pain, palpitations, orthopnea and leg swelling. Gastrointestinal: Negative for abdominal pain, blood in stool, constipation and nausea. Genitourinary: Negative for difficulty urinating, frequency and urgency. Musculoskeletal: Negative for arthralgias, back pain, joint swelling and neck stiffness. Skin: Negative for rash. Neurological: Negative for dizziness, weakness and headaches.    Hematological: Negative for adenopathy. Does not bruise/bleed easily. Psychiatric/Behavioral: Negative for behavioral problems, dysphoric mood and sleep disturbance. /84 (Site: Right Upper Arm, Position: Sitting, Cuff Size: Medium Adult)   Pulse 72   Temp 97.2 °F (36.2 °C) (Infrared)   Resp 10   Ht 5' 11\" (1.803 m)   Wt 250 lb 8 oz (113.6 kg)   BMI 34.94 kg/m²   Objective:   Physical Exam  Vitals signs and nursing note reviewed. Constitutional:       Appearance: He is well-developed. HENT:      Head: Normocephalic and atraumatic. Right Ear: External ear normal.      Left Ear: External ear normal.      Nose: Nose normal.   Eyes:      Conjunctiva/sclera: Conjunctivae normal.      Pupils: Pupils are equal, round, and reactive to light. Comments: Fundi nl   Neck:      Musculoskeletal: Normal range of motion and neck supple. Thyroid: No thyromegaly. Cardiovascular:      Rate and Rhythm: Normal rate and regular rhythm. Heart sounds: Normal heart sounds. Pulmonary:      Effort: Pulmonary effort is normal.      Breath sounds: Normal breath sounds. No wheezing or rales. Abdominal:      General: Bowel sounds are normal.      Palpations: Abdomen is soft. There is no mass. Tenderness: There is no abdominal tenderness. Musculoskeletal: Normal range of motion. Lymphadenopathy:      Cervical: No cervical adenopathy. Skin:     General: Skin is warm and dry. Findings: No rash. Neurological:      Mental Status: He is alert and oriented to person, place, and time. Cranial Nerves: No cranial nerve deficit. Deep Tendon Reflexes: Reflexes are normal and symmetric. Assessment:       Diagnosis Orders   1. Essential hypertension  losartan (COZAAR) 50 MG tablet   2. S/P CABG x 6,2007     3. Hyperlipidemia, unspecified hyperlipidemia type     4.  Coronary artery disease involving autologous vein coronary bypass graft without angina pectoris           Plan:      Current Outpatient Medications   Medication Sig Dispense Refill    vitamin D (ERGOCALCIFEROL) 1.25 MG (81054 UT) CAPS capsule TAKE 1 CAPSULE ONCE A WEEK 12 capsule 3    metoprolol tartrate (LOPRESSOR) 25 MG tablet TAKE 1 TABLET TWICE A  tablet 3    atorvastatin (LIPITOR) 40 MG tablet TAKE 1 TABLET DAILY 90 tablet 3    losartan (COZAAR) 50 MG tablet Take 1 tablet by mouth daily 30 tablet 1    Ascorbic Acid (VITAMIN C) 250 MG tablet Take 250 mg by mouth daily      aspirin 81 MG EC tablet Take 81 mg by mouth 2 times daily       acetaminophen (TYLENOL ARTHRITIS PAIN) 650 MG extended release tablet Take 650 mg by mouth every 8 hours as needed for Pain      sodium chloride (OCEAN, BABY AYR) 0.65 % nasal spray 1 spray by Nasal route as needed for Congestion      Multiple Vitamin (MULTI-VITAMIN) TABS Take by mouth daily        No current facility-administered medications for this visit. No orders of the defined types were placed in this encounter.       Angel Bryant MD

## 2021-04-16 NOTE — TELEPHONE ENCOUNTER
Pt called back said that he is completely out of Losartan 50 mg one daily. Please send a 14 day Rx to [facility].     We already sent a 90 day to [facility]

## 2021-04-16 NOTE — TELEPHONE ENCOUNTER
Date of last visit:  4/16/2021  Date of next visit:  Visit date not found    Requested Prescriptions     Signed Prescriptions Disp Refills    losartan (COZAAR) 50 MG tablet 14 tablet 0     Sig: Take 1 tablet by mouth daily     Authorizing Provider: Ian Moulton     Ordering User: Madhavi Resendez

## 2021-05-10 DIAGNOSIS — I10 ESSENTIAL HYPERTENSION: ICD-10-CM

## 2021-05-10 DIAGNOSIS — I25.10 CORONARY ARTERY DISEASE INVOLVING NATIVE CORONARY ARTERY OF NATIVE HEART WITHOUT ANGINA PECTORIS: ICD-10-CM

## 2021-05-10 NOTE — TELEPHONE ENCOUNTER
Date of last visit:  4/16/2021  Date of next visit:  10/18/2021    Requested Prescriptions     Signed Prescriptions Disp Refills    metoprolol tartrate (LOPRESSOR) 25 MG tablet 14 tablet 0     Sig: TAKE 1 TABLET TWICE A DAY     Authorizing Provider: Ian Moulton     Ordering User: Cece Najera informed by Phone.

## 2021-06-08 ENCOUNTER — OFFICE VISIT (OUTPATIENT)
Dept: INTERNAL MEDICINE CLINIC | Age: 71
End: 2021-06-08
Payer: MEDICARE

## 2021-06-08 VITALS
HEIGHT: 71 IN | TEMPERATURE: 98.1 F | SYSTOLIC BLOOD PRESSURE: 140 MMHG | BODY MASS INDEX: 34.16 KG/M2 | DIASTOLIC BLOOD PRESSURE: 76 MMHG | HEART RATE: 70 BPM | WEIGHT: 244 LBS

## 2021-06-08 DIAGNOSIS — R35.0 URINARY FREQUENCY: ICD-10-CM

## 2021-06-08 DIAGNOSIS — E78.5 HYPERLIPIDEMIA, UNSPECIFIED HYPERLIPIDEMIA TYPE: ICD-10-CM

## 2021-06-08 DIAGNOSIS — I10 ESSENTIAL HYPERTENSION: Primary | ICD-10-CM

## 2021-06-08 DIAGNOSIS — I25.810 CORONARY ARTERY DISEASE INVOLVING AUTOLOGOUS VEIN CORONARY BYPASS GRAFT WITHOUT ANGINA PECTORIS: ICD-10-CM

## 2021-06-08 PROCEDURE — 99214 OFFICE O/P EST MOD 30 MIN: CPT | Performed by: NURSE PRACTITIONER

## 2021-06-08 PROCEDURE — 93000 ELECTROCARDIOGRAM COMPLETE: CPT | Performed by: NURSE PRACTITIONER

## 2021-06-08 SDOH — ECONOMIC STABILITY: FOOD INSECURITY: WITHIN THE PAST 12 MONTHS, THE FOOD YOU BOUGHT JUST DIDN'T LAST AND YOU DIDN'T HAVE MONEY TO GET MORE.: NEVER TRUE

## 2021-06-08 SDOH — ECONOMIC STABILITY: FOOD INSECURITY: WITHIN THE PAST 12 MONTHS, YOU WORRIED THAT YOUR FOOD WOULD RUN OUT BEFORE YOU GOT MONEY TO BUY MORE.: NEVER TRUE

## 2021-06-08 ASSESSMENT — SOCIAL DETERMINANTS OF HEALTH (SDOH): HOW HARD IS IT FOR YOU TO PAY FOR THE VERY BASICS LIKE FOOD, HOUSING, MEDICAL CARE, AND HEATING?: NOT HARD AT ALL

## 2021-06-08 ASSESSMENT — ENCOUNTER SYMPTOMS
BLOOD IN STOOL: 0
WHEEZING: 0
SORE THROAT: 0
COUGH: 0
PHOTOPHOBIA: 0
TROUBLE SWALLOWING: 0
CONSTIPATION: 0
ABDOMINAL PAIN: 0
NAUSEA: 0
SINUS PRESSURE: 0
RHINORRHEA: 0
SINUS PAIN: 0
ABDOMINAL DISTENTION: 0
SHORTNESS OF BREATH: 0
DIARRHEA: 0

## 2021-06-08 NOTE — PROGRESS NOTES
[address] INTERNAL MEDICINE AND MEDICATION MANAGEMENT  [facility]  Dept: [phone]  Dept Fax: [phone]: [phone]     Visit Date:  2021    Patient:  Fabricio Scott  YOB: 1950    HPI:     Chief Complaint   Patient presents with    6 Month Follow-Up    Hypertension    Hyperlipidemia     Fabricio Scott (:  1950) is a 69 y.o. male, here for evaluation of the following medical concerns: CAD, HTN, Hyperlipidemia, and urinary frequency.      I last seen Ned 6 months ago. He follows routinely with Dr. July Wilson.     CAD/HTN-  He reports he is taking medications as instructed, with no medication side effects. He does not perform home BP monitoring. Reports occasional shortness of breath on exertion which is not new and is unchanged. Denies chest pain, swelling of ankles, palpitations, and orthostatic dizziness or lightheadedness. /76 today in office. Stress test in 2019, normal. Cardiac catheterization completed in  post CABG. EKG completed and reviewed.     Hyperlipidemia-  He states he is taking atorvastatin 40 mg daily as ordered. Lifestyle modifications including heart healthy diet, weight loss and exercise are/are not being followed.  Last lipid profile shows LDL 39 < 100 (<70 with CVD), triglycerides 196 <150, HDL 29 >40 (male, HDL >50 female) in 3/2021.      The ASCVD Risk score (Margarita Gastelum, et al., 2013) failed to calculate for the following reasons: The valid total cholesterol range is 130 to 320 mg/dL     Urinary Frequency-  Urinary frequency has improved. He denies any dysuria or hematuria. No fevers. No urinary incontinence. PSA 3.81 on .  Follows with urology routinely.      Medications    Current Outpatient Medications:     metoprolol tartrate (LOPRESSOR) 25 MG tablet, TAKE 1 TABLET TWICE A DAY, Disp: 14 tablet, Rfl: 0    losartan (COZAAR) 50 MG tablet, Take 1 tablet by mouth daily, Disp: 14 tablet, Rfl: 0    vitamin D (ERGOCALCIFEROL) 1.25 MG (46522 UT) CAPS capsule, TAKE 1 CAPSULE ONCE A WEEK, Disp: 12 capsule, Rfl: 3    atorvastatin (LIPITOR) 40 MG tablet, TAKE 1 TABLET DAILY, Disp: 90 tablet, Rfl: 3    Ascorbic Acid (VITAMIN C) 250 MG tablet, Take 250 mg by mouth daily, Disp: , Rfl:     aspirin 81 MG EC tablet, Take 81 mg by mouth 2 times daily , Disp: , Rfl:     acetaminophen (TYLENOL ARTHRITIS PAIN) 650 MG extended release tablet, Take 650 mg by mouth every 8 hours as needed for Pain, Disp: , Rfl:     sodium chloride (OCEAN, BABY AYR) 0.65 % nasal spray, 1 spray by Nasal route as needed for Congestion, Disp: , Rfl:     Multiple Vitamin (MULTI-VITAMIN) TABS, Take by mouth daily , Disp: , Rfl:     The patient has No Known Allergies. Past Medical History  Willy Soler  has a past medical history of Arthritis, ASHD (arteriosclerotic heart disease), CAD (coronary artery disease), Colon polyps, History of blood transfusion, Hyperlipidemia, Hypertension, and Mild carotid artery disease ([facility] [address] Past Surgical History  The patient  has a past surgical history that includes Colonoscopy (12/2019); Coronary artery bypass graft (11/2005); Cardiac surgery (2005); Tonsillectomy; Nasal polyp surgery (06/05/2015); Colonoscopy (Left, 09/13/2019); Excision of Aural Mass; and Skin cancer excision (2018). Family History  This patient's family history includes Asthma in his brother; Cancer in his father; Diabetes in his sister; Heart Disease in his mother. Social History  Willy Soler  reports that he quit smoking about 19 years ago. His smoking use included cigarettes. He has a 45.00 pack-year smoking history. He has never used smokeless tobacco. He reports current alcohol use. He reports that he does not use drugs.     Health Maintenance:    Health Maintenance   Topic Date Due    DTaP/Tdap/Td vaccine (1 - Tdap) Never done    Shingles Vaccine (2 of 3) 08/14/2009    Pneumococcal 65+ years Vaccine (2 of 2 - PPSV23) 08/27/2014    Annual Wellness Visit (AWV)  Never done    PSA counseling  07/29/2021    Lipid screen  03/10/2022    Potassium monitoring  03/10/2022    Creatinine monitoring  03/10/2022    Colon cancer screen colonoscopy  09/13/2029    Flu vaccine  Completed    COVID-19 Vaccine  Completed    AAA screen  Completed    Hepatitis C screen  Completed    Hepatitis A vaccine  Aged Out    Hepatitis B vaccine  Aged Out    Hib vaccine  Aged Out    Meningococcal (ACWY) vaccine  Aged Out       Subjective:      Review of Systems   Constitutional: Negative for chills, fatigue and fever. HENT: Negative for congestion, rhinorrhea, sinus pressure, sinus pain, sore throat, tinnitus and trouble swallowing. Eyes: Negative for photophobia and visual disturbance. Respiratory: Negative for cough, shortness of breath and wheezing. Cardiovascular: Negative for chest pain, palpitations and leg swelling. Gastrointestinal: Negative for abdominal distention, abdominal pain, blood in stool, constipation, diarrhea and nausea. Genitourinary: Negative for difficulty urinating, dysuria, frequency and hematuria. Musculoskeletal: Negative for arthralgias and myalgias. Skin: Negative for rash and wound. Neurological: Negative for dizziness, light-headedness and headaches. Psychiatric/Behavioral: Negative for dysphoric mood and sleep disturbance. The patient is not nervous/anxious. Objective:     BP (!) 140/76 (Site: Left Upper Arm, Cuff Size: Medium Adult)   Pulse 70   Temp 98.1 °F (36.7 °C) (Temporal)   Ht 5' 11\" (1.803 m)   Wt 244 lb (110.7 kg)   BMI 34.03 kg/m²     Physical Exam  Constitutional:       General: He is not in acute distress. Appearance: Normal appearance. He is well-developed. He is not ill-appearing. HENT:      Head: Normocephalic and atraumatic.       Right Ear: Tympanic membrane, ear canal and external ear normal.      Left Ear: Tympanic membrane, ear canal and external ear normal.      Nose: Nose normal. No congestion or rhinorrhea. Mouth/Throat:      Mouth: Mucous membranes are moist.      Pharynx: Oropharynx is clear. No oropharyngeal exudate or posterior oropharyngeal erythema. Eyes:      Extraocular Movements: Extraocular movements intact. Conjunctiva/sclera: Conjunctivae normal.      Pupils: Pupils are equal, round, and reactive to light. Neck:      Thyroid: No thyromegaly. Vascular: No JVD. Cardiovascular:      Rate and Rhythm: Normal rate and regular rhythm. Pulses: Normal pulses. Heart sounds: Normal heart sounds. No murmur heard. No friction rub. No gallop. Pulmonary:      Effort: Pulmonary effort is normal. No respiratory distress. Breath sounds: Normal breath sounds. No wheezing, rhonchi or rales. Abdominal:      General: Bowel sounds are normal. There is no distension. Palpations: Abdomen is soft. Tenderness: There is no abdominal tenderness. Musculoskeletal:         General: Normal range of motion. Cervical back: Normal range of motion and neck supple. Right lower leg: No edema. Left lower leg: No edema. Lymphadenopathy:      Cervical: No cervical adenopathy. Skin:     General: Skin is warm and dry. Findings: No erythema or rash. Neurological:      General: No focal deficit present. Mental Status: He is alert and oriented to person, place, and time. Mental status is at baseline. Motor: No weakness. Coordination: Coordination normal.      Gait: Gait normal.   Psychiatric:         Mood and Affect: Mood normal.         Behavior: Behavior normal.         Thought Content:  Thought content normal.         Judgment: Judgment normal.         Labs Reviewed 6/8/2021:    Lab Results   Component Value Date    WBC 7.2 06/09/2020    WBC 5 06/09/2020    HGB 14.2 06/09/2020    HCT 42.4 06/09/2020     06/09/2020    CHOL 92 (L) 06/09/2020 TRIG 156 (H) 06/09/2020    HDL 29 (L) 06/09/2020    LDLDIRECT 47.56 12/28/2018    ALT 26 06/09/2020    AST 25 06/09/2020     06/09/2020    K 4.1 06/09/2020     06/09/2020    CREATININE 0.98 06/09/2020    BUN 16 06/09/2020    CO2 22 06/09/2020    TSH 2.35 06/09/2020    PSA 3.89 (H) 07/10/2019    INR 1.09 06/03/2015       Assessment/Plan      1. Essential hypertension    - EKG 12 Lead  - Medications reviewed, continue current regimen  - Monitor periodically at home, call with readings > 150/90 or < 100/50  - Adhere to a no added salt diet    2. Coronary artery disease involving autologous vein coronary bypass graft without angina pectoris    - Continue ASA    3. Hyperlipidemia, unspecified hyperlipidemia type    - Discussed 10 year ASCVD risk and lifestyle modifications to lower risk  - Continue atorvastatin 40 mg daily    4. Urinary frequency    - Follow with urology as scheduled      Return in about 6 months (around 12/8/2021). Patient given educational materials - see patient instructions. Discussed use, benefit, and side effects of prescribed medications. All patient questions answered. Pt voiced understanding. Reviewed health maintenance.        Electronically signed RASHAD Simmons - CNP on 6/8/21 at 9:41 AM EDT

## 2021-08-04 LAB — PSA, ULTRASENSITIVE: 4.13 NG/ML (ref 0–4)

## 2021-08-10 ENCOUNTER — HOSPITAL ENCOUNTER (OUTPATIENT)
Dept: GENERAL RADIOLOGY | Age: 71
Discharge: HOME | End: 2021-08-10
Payer: MEDICARE

## 2021-08-10 ENCOUNTER — HOSPITAL ENCOUNTER (OUTPATIENT)
Age: 71
Discharge: HOME | End: 2021-08-10
Payer: MEDICARE

## 2021-08-10 DIAGNOSIS — N20.0 NEPHROLITHIASIS: ICD-10-CM

## 2021-08-10 PROCEDURE — 74018 RADEX ABDOMEN 1 VIEW: CPT

## 2021-08-19 ENCOUNTER — OFFICE VISIT (OUTPATIENT)
Dept: UROLOGY | Age: 71
End: 2021-08-19
Payer: MEDICARE

## 2021-08-19 VITALS — HEIGHT: 71 IN | RESPIRATION RATE: 16 BRPM | BODY MASS INDEX: 33.04 KG/M2 | WEIGHT: 236 LBS

## 2021-08-19 DIAGNOSIS — N20.0 NEPHROLITHIASIS: ICD-10-CM

## 2021-08-19 DIAGNOSIS — N13.8 BPH WITH OBSTRUCTION/LOWER URINARY TRACT SYMPTOMS: ICD-10-CM

## 2021-08-19 DIAGNOSIS — N40.1 BPH WITH OBSTRUCTION/LOWER URINARY TRACT SYMPTOMS: ICD-10-CM

## 2021-08-19 DIAGNOSIS — R97.20 ELEVATED PSA: Primary | ICD-10-CM

## 2021-08-19 LAB
BILIRUBIN URINE: NEGATIVE
BLOOD URINE, POC: NORMAL
CHARACTER, URINE: CLEAR
COLOR, URINE: YELLOW
GLUCOSE URINE: NEGATIVE MG/DL
KETONES, URINE: NEGATIVE
LEUKOCYTE CLUMPS, URINE: NEGATIVE
NITRITE, URINE: NEGATIVE
PH, URINE: 5 (ref 5–9)
PROTEIN, URINE: NEGATIVE MG/DL
SPECIFIC GRAVITY, URINE: 1.02 (ref 1–1.03)
UROBILINOGEN, URINE: 0.2 EU/DL (ref 0–1)

## 2021-08-19 PROCEDURE — 99214 OFFICE O/P EST MOD 30 MIN: CPT | Performed by: UROLOGY

## 2021-08-19 PROCEDURE — 81003 URINALYSIS AUTO W/O SCOPE: CPT | Performed by: UROLOGY

## 2021-10-13 DIAGNOSIS — I10 ESSENTIAL HYPERTENSION: ICD-10-CM

## 2021-10-13 RX ORDER — LOSARTAN POTASSIUM 50 MG/1
TABLET ORAL
Qty: 90 TABLET | Refills: 0 | Status: SHIPPED | OUTPATIENT
Start: 2021-10-13 | End: 2021-10-18

## 2021-10-13 NOTE — TELEPHONE ENCOUNTER
Date of last visit:  4/16/2021  Date of next visit:  10/18/2021    Requested Prescriptions     Pending Prescriptions Disp Refills    losartan (COZAAR) 50 MG tablet [Pharmacy Med Name: LOSARTAN TABS 50MG] 90 tablet 0     Sig: TAKE 1 TABLET DAILY

## 2021-10-18 ENCOUNTER — OFFICE VISIT (OUTPATIENT)
Dept: FAMILY MEDICINE CLINIC | Age: 71
End: 2021-10-18

## 2021-10-18 VITALS
DIASTOLIC BLOOD PRESSURE: 82 MMHG | TEMPERATURE: 96.7 F | HEIGHT: 71 IN | SYSTOLIC BLOOD PRESSURE: 150 MMHG | HEART RATE: 72 BPM | WEIGHT: 239.25 LBS | RESPIRATION RATE: 16 BRPM | BODY MASS INDEX: 33.49 KG/M2

## 2021-10-18 DIAGNOSIS — I25.810 CORONARY ARTERY DISEASE INVOLVING CORONARY BYPASS GRAFT OF NATIVE HEART WITHOUT ANGINA PECTORIS: ICD-10-CM

## 2021-10-18 DIAGNOSIS — Z23 NEED FOR INFLUENZA VACCINATION: ICD-10-CM

## 2021-10-18 DIAGNOSIS — E78.5 HYPERLIPIDEMIA, UNSPECIFIED HYPERLIPIDEMIA TYPE: ICD-10-CM

## 2021-10-18 DIAGNOSIS — Z00.00 ROUTINE GENERAL MEDICAL EXAMINATION AT A HEALTH CARE FACILITY: ICD-10-CM

## 2021-10-18 DIAGNOSIS — D12.6 ADENOMATOUS POLYP OF COLON, UNSPECIFIED PART OF COLON: ICD-10-CM

## 2021-10-18 DIAGNOSIS — I25.810 CORONARY ARTERY DISEASE INVOLVING AUTOLOGOUS VEIN CORONARY BYPASS GRAFT WITHOUT ANGINA PECTORIS: ICD-10-CM

## 2021-10-18 DIAGNOSIS — I10 ESSENTIAL HYPERTENSION: Primary | ICD-10-CM

## 2021-10-18 PROCEDURE — 99213 OFFICE O/P EST LOW 20 MIN: CPT | Performed by: FAMILY MEDICINE

## 2021-10-18 PROCEDURE — G0008 ADMIN INFLUENZA VIRUS VAC: HCPCS | Performed by: FAMILY MEDICINE

## 2021-10-18 PROCEDURE — G0439 PPPS, SUBSEQ VISIT: HCPCS | Performed by: FAMILY MEDICINE

## 2021-10-18 PROCEDURE — 90688 IIV4 VACCINE SPLT 0.5 ML IM: CPT | Performed by: FAMILY MEDICINE

## 2021-10-18 RX ORDER — LOSARTAN POTASSIUM 50 MG/1
100 TABLET ORAL DAILY
Qty: 90 TABLET | Refills: 0
Start: 2021-10-18 | End: 2021-12-08 | Stop reason: SDUPTHER

## 2021-10-18 ASSESSMENT — PATIENT HEALTH QUESTIONNAIRE - PHQ9
2. FEELING DOWN, DEPRESSED OR HOPELESS: 0
SUM OF ALL RESPONSES TO PHQ QUESTIONS 1-9: 0
1. LITTLE INTEREST OR PLEASURE IN DOING THINGS: 0
SUM OF ALL RESPONSES TO PHQ9 QUESTIONS 1 & 2: 0

## 2021-10-18 ASSESSMENT — ENCOUNTER SYMPTOMS
EYE PAIN: 0
NAUSEA: 0
SHORTNESS OF BREATH: 0
COUGH: 0
BLOOD IN STOOL: 0
TROUBLE SWALLOWING: 0
BACK PAIN: 0
ABDOMINAL PAIN: 0
SORE THROAT: 0
CHEST TIGHTNESS: 0
CONSTIPATION: 0

## 2021-10-18 ASSESSMENT — LIFESTYLE VARIABLES
HOW OFTEN DURING THE LAST YEAR HAVE YOU BEEN UNABLE TO REMEMBER WHAT HAPPENED THE NIGHT BEFORE BECAUSE YOU HAD BEEN DRINKING: 0
HAS A RELATIVE, FRIEND, DOCTOR, OR ANOTHER HEALTH PROFESSIONAL EXPRESSED CONCERN ABOUT YOUR DRINKING OR SUGGESTED YOU CUT DOWN: 0
HOW OFTEN DURING THE LAST YEAR HAVE YOU HAD A FEELING OF GUILT OR REMORSE AFTER DRINKING: 0
HOW OFTEN DO YOU HAVE SIX OR MORE DRINKS ON ONE OCCASION: 0
HOW OFTEN DURING THE LAST YEAR HAVE YOU FAILED TO DO WHAT WAS NORMALLY EXPECTED FROM YOU BECAUSE OF DRINKING: 0
AUDIT TOTAL SCORE: 1
HOW OFTEN DURING THE LAST YEAR HAVE YOU NEEDED AN ALCOHOLIC DRINK FIRST THING IN THE MORNING TO GET YOURSELF GOING AFTER A NIGHT OF HEAVY DRINKING: 0
HAVE YOU OR SOMEONE ELSE BEEN INJURED AS A RESULT OF YOUR DRINKING: 0
HOW MANY STANDARD DRINKS CONTAINING ALCOHOL DO YOU HAVE ON A TYPICAL DAY: 0
HOW OFTEN DO YOU HAVE A DRINK CONTAINING ALCOHOL: 1
HOW OFTEN DURING THE LAST YEAR HAVE YOU FOUND THAT YOU WERE NOT ABLE TO STOP DRINKING ONCE YOU HAD STARTED: 0
AUDIT-C TOTAL SCORE: 1

## 2021-10-18 NOTE — PROGRESS NOTES
Immunizations Administered     Name Date Dose Route    Influenza, Quadv, IM, (6 mo and older Fluzone, Flulaval, Fluarix and 3 yrs and older Afluria) 10/18/2021 0.5 mL Intramuscular    Site: Deltoid- Left    Lot: [id number]    NDC: 60622-651-80

## 2021-10-18 NOTE — PATIENT INSTRUCTIONS
Personalized Preventive Plan for Zack Jorge - 10/18/2021  Medicare offers a range of preventive health benefits. Some of the tests and screenings are paid in full while other may be subject to a deductible, co-insurance, and/or copay. Some of these benefits include a comprehensive review of your medical history including lifestyle, illnesses that may run in your family, and various assessments and screenings as appropriate. After reviewing your medical record and screening and assessments performed today your provider may have ordered immunizations, labs, imaging, and/or referrals for you. A list of these orders (if applicable) as well as your Preventive Care list are included within your After Visit Summary for your review. Other Preventive Recommendations:    · A preventive eye exam performed by an eye specialist is recommended every 1-2 years to screen for glaucoma; cataracts, macular degeneration, and other eye disorders. · A preventive dental visit is recommended every 6 months. · Try to get at least 150 minutes of exercise per week or 10,000 steps per day on a pedometer . · Order or download the FREE \"Exercise & Physical Activity: Your Everyday Guide\" from [employer]. Call 2-774.775.5065 or search [employer] online. · You need 8233-7599 mg of calcium and 2527-2346 IU of vitamin D per day. It is possible to meet your calcium requirement with diet alone, but a vitamin D supplement is usually necessary to meet this goal.  · When exposed to the sun, use a sunscreen that protects against both UVA and UVB radiation with an SPF of 30 or greater. Reapply every 2 to 3 hours or after sweating, drying off with a towel, or swimming. · Always wear a seat belt when traveling in a car. Always wear a helmet when riding a bicycle or motorcycle.

## 2021-10-18 NOTE — PROGRESS NOTES
Medicare Annual Wellness Visit  Name: Sharmaine Meigs Date: 10/18/2021   MRN: [id number] Sex: Male   Age: 66 y.o. Ethnicity: Non- / Non    : 1950 Race: White (non-)      Jennifer Khalil is here for Medicare AWV    Screenings for behavioral, psychosocial and functional/safety risks, and cognitive dysfunction are all negative except as indicated below. These results, as well as other patient data from the [address] form, are documented in Flowsheets linked to this Encounter. No Known Allergies    Prior to Visit Medications    Medication Sig Taking? Authorizing Provider   losartan (COZAAR) 50 MG tablet TAKE 1 TABLET DAILY Yes Juanjo Mai MD   metoprolol tartrate (LOPRESSOR) 25 MG tablet TAKE 1 TABLET TWICE A DAY Yes Chuck Atkins MD   vitamin D (ERGOCALCIFEROL) 1.25 MG (45593 UT) CAPS capsule TAKE 1 CAPSULE ONCE A WEEK Yes RASHAD Dillon CNP   atorvastatin (LIPITOR) 40 MG tablet TAKE 1 TABLET DAILY Yes RASHAD Dillon CNP   Ascorbic Acid (VITAMIN C) 250 MG tablet Take 250 mg by mouth daily Yes Historical Provider, MD   aspirin 81 MG EC tablet Take 81 mg by mouth 2 times daily  Yes Historical Provider, MD   acetaminophen (TYLENOL ARTHRITIS PAIN) 650 MG extended release tablet Take 650 mg by mouth every 8 hours as needed for Pain Yes Historical Provider, MD   sodium chloride (OCEAN, BABY AYR) 0.65 % nasal spray 1 spray by Nasal route as needed for Congestion Yes Historical Provider, MD   Multiple Vitamin (MULTI-VITAMIN) TABS Take by mouth daily  Yes Historical Provider, MD       Past Medical History:   Diagnosis Date    Arthritis     ASHD (arteriosclerotic heart disease)     CAD (coronary artery disease)     Status post bypass    Colon polyps 2009      colonoscopy  tubular  adenoma   [facility]    History of blood transfusion     ? ???? with by pass? ??    Hyperlipidemia     Hypertension     Mild carotid artery disease [facility]) 2013       Past Surgical History:   Procedure Laterality Date    CARDIAC SURGERY  2005    6 by pass    COLONOSCOPY  12/2019    Dr Jonny Drake  colon polyps 8//2014 9-2019    COLONOSCOPY Left 09/13/2019    COLONOSCOPY POLYPECTOMY SNARE/COLD BIOPSY performed by Teresita Angel MD at [facility]   [address]  11/2005    EXCISION OF AURAL MASS      NASAL POLYP SURGERY  06/05/2015    BIOPSY OF NASAL POLYP MIDDLE TURBINATE AND BIOPSY SEPTAL [address] SKIN CANCER EXCISION  2018    on back    TONSILLECTOMY      as a child       Family History   Problem Relation Age of Onset    Heart Disease Mother     Cancer Father         throat    Diabetes Sister     Asthma Brother        CareTeam (Including outside providers/suppliers regularly involved in providing care):   Patient Care Team:  Gerline Schirmer, MD as PCP - General  Gerline Schirmer, MD as PCP - [facility] Empaneled Provider  John Dawkins MD as Consulting Physician (Internal Medicine)    Wt Readings from Last 3 Encounters:   10/18/21 239 lb 4 oz (108.5 kg)   08/19/21 236 lb (107 kg)   06/08/21 244 lb (110.7 kg)     Vitals:    10/18/21 0835 10/18/21 0839   BP: (!) 154/76 (!) 150/82   Site: Right Upper Arm Right Upper Arm   Position: Sitting Sitting   Cuff Size: Medium Adult Medium Adult   Pulse: 72    Resp: 16    Temp: 96.7 °F (35.9 °C)    TempSrc: Infrared    Weight: 239 lb 4 oz (108.5 kg)    Height: 5' 11\" (1.803 m)      Body mass index is 33.37 kg/m². Based upon direct observation of the patient, evaluation of cognition reveals recent and remote memory intact. Patient's complete Health Risk Assessment and screening values have been reviewed and are found in Flowsheets. The following problems were reviewed today and where indicated follow up appointments were made and/or referrals ordered.     Positive Risk Factor Screenings with Interventions:           Health Habits/Nutrition:  Health Habits/Nutrition  Do you exercise for at least 20 minutes 2-3 times per week?: Yes  Have you lost any weight without trying in the past 3 months?: No  Do you eat only one meal per day?: No  Have you seen the dentist within the past year?: Yes  Body mass index: (!) 33.36  Health Habits/Nutrition Interventions:  ·   weight  loss      Power  Of health   Daughter   cheng torrez       Personalized Preventive Plan   Current Health Maintenance Status  Immunization History   Administered Date(s) Administered    COVID-19, Moderna, PF, 100mcg/0.5mL 01/15/2021, 02/10/2021    Influenza 10/23/2012    Influenza Virus Vaccine 11/06/2013, 10/06/2014, 10/07/2015    Influenza Whole 10/01/2009    Influenza, High Dose (Fluzone 65 yrs and older) 09/24/2018    Influenza, MDCK Quadv, IM, PF (Flucelvax 2 yrs and older) 10/19/2017    Influenza, Braden Chaparrita, IM, (6 mo and older Fluzone, Flulaval, Fluarix and 3 yrs and older Afluria) 10/17/2016, 10/16/2019, 10/18/2021    Influenza, Quadv, adjuvanted, 65 yrs +, IM, PF (Fluad) 10/01/2020    Pneumococcal Conjugate 13-valent ([id number]) 08/27/2013    Pneumococcal Polysaccharide ([id number]) 02/07/2007    Zoster Live (Zostavax) 06/19/2009        Health Maintenance   Topic Date Due    DTaP/Tdap/Td vaccine (1 - Tdap) Never done    Shingles Vaccine (2 of 3) 08/14/2009    Pneumococcal 65+ years Vaccine (2 of 2 - PPSV23) 08/27/2014    Annual Wellness Visit (AWV)  Never done    Lipid screen  03/10/2022    Potassium monitoring  03/10/2022    Creatinine monitoring  03/10/2022    PSA counseling  08/04/2022    Colon cancer screen colonoscopy  09/13/2029    Flu vaccine  Completed    COVID-19 Vaccine  Completed    AAA screen  Completed    Hepatitis C screen  Completed    Hepatitis A vaccine  Aged Out    Hepatitis B vaccine  Aged Out    Hib vaccine  Aged Out    Meningococcal (ACWY) vaccine  Aged Out     Recommendations for [employer] Due: see orders and patient instructions/AVS.  .   Recommended screening schedule for the next 5-10 years is provided to the patient in written form: see Patient Instructions/AVS.    Eneida Ramos was seen today for medicare awv. Diagnoses and all orders for this visit:    Need for influenza vaccination  -     INFLUENZA, QUADV, 3 YRS AND OLDER, IM, MDV, 0.5ML (Natachaleen Roger)           Colonoscopy          Jennifer Khalil (:  1950) is a 66 y.o. male,Established patient, here for evaluation of the following chief complaint(s):  Medicare AWV         ASSESSMENT/   Diagnosis Orders   1. Essential hypertension  losartan (COZAAR) 50 MG tablet   2. Need for influenza vaccination  INFLUENZA, QUADV, 3 YRS AND OLDER, IM, MDV, 0.5ML (Merleen Roger)   3. Coronary artery disease involving autologous vein coronary bypass graft without angina pectoris     4. Hyperlipidemia, unspecified hyperlipidemia type     5. Coronary artery disease involving coronary bypass graft of native heart without angina pectoris     6. Adenomatous polyp of colon, unspecified part of colon         PLAN:  Current Outpatient Medications   Medication Sig Dispense Refill    losartan (COZAAR) 50 MG tablet Take 2 tablets by mouth daily 90 tablet 0    metoprolol tartrate (LOPRESSOR) 25 MG tablet TAKE 1 TABLET TWICE A DAY 14 tablet 0    vitamin D (ERGOCALCIFEROL) 1.25 MG (92860 UT) CAPS capsule TAKE 1 CAPSULE ONCE A WEEK 12 capsule 3    atorvastatin (LIPITOR) 40 MG tablet TAKE 1 TABLET DAILY 90 tablet 3    Ascorbic Acid (VITAMIN C) 250 MG tablet Take 250 mg by mouth daily      aspirin 81 MG EC tablet Take 81 mg by mouth 2 times daily       acetaminophen (TYLENOL ARTHRITIS PAIN) 650 MG extended release tablet Take 650 mg by mouth every 8 hours as needed for Pain      sodium chloride (OCEAN, BABY AYR) 0.65 % nasal spray 1 spray by Nasal route as needed for Congestion      Multiple Vitamin (MULTI-VITAMIN) TABS Take by mouth daily        No current facility-administered medications for this visit.      Orders Placed This Encounter   Procedures    INFLUENZA, QUADV, 3 YRS AND OLDER, IM, MDV, 0.5ML (Tracey Duran)     No follow-ups on file. Increase  The  Losartan   To  100mg and  bp  Check  In   10 days       See in  6  Months     Subjective   SUBJECTIVE/OBJECTIVE:  HPI       htn  Stable       Vaccine  Ok       ashd  Stable      lipids  stable       Colon  Polyps and  With  colonscopy    Review of Systems   Constitutional: Negative for fatigue and fever. HENT: Negative for congestion, ear pain, postnasal drip, sore throat and trouble swallowing. Eyes: Negative for pain. Respiratory: Negative for cough, chest tightness and shortness of breath. Cardiovascular: Negative for chest pain, palpitations and leg swelling. Gastrointestinal: Negative for abdominal pain, blood in stool, constipation and nausea. Genitourinary: Negative for difficulty urinating, frequency and urgency. Musculoskeletal: Negative for arthralgias, back pain, joint swelling and neck stiffness. Skin: Negative for rash. Neurological: Negative for dizziness, weakness and headaches. Hematological: Negative for adenopathy. Does not bruise/bleed easily. Psychiatric/Behavioral: Negative for behavioral problems, dysphoric mood and sleep disturbance. BP (!) 150/82 (Site: Right Upper Arm, Position: Sitting, Cuff Size: Medium Adult)   Pulse 72   Temp 96.7 °F (35.9 °C) (Infrared)   Resp 16   Ht 5' 11\" (1.803 m)   Wt 239 lb 4 oz (108.5 kg)   BMI 33.37 kg/m²   Objective   Physical Exam  Vitals and nursing note reviewed. Constitutional:       Appearance: He is well-developed. HENT:      Head: Normocephalic and atraumatic. Right Ear: External ear normal.      Left Ear: External ear normal.      Nose: Nose normal.   Eyes:      Conjunctiva/sclera: Conjunctivae normal.      Pupils: Pupils are equal, round, and reactive to light. Comments: Fundi nl   Neck:      Thyroid: No thyromegaly.    Cardiovascular:      Rate and Rhythm: Normal rate and regular rhythm. Heart sounds: Normal heart sounds. Pulmonary:      Effort: Pulmonary effort is normal.      Breath sounds: Normal breath sounds. No wheezing or rales. Abdominal:      General: Bowel sounds are normal.      Palpations: Abdomen is soft. There is no mass. Tenderness: There is no abdominal tenderness. Musculoskeletal:         General: Normal range of motion. Cervical back: Normal range of motion and neck supple. Lymphadenopathy:      Cervical: No cervical adenopathy. Skin:     General: Skin is warm and dry. Findings: No rash. Neurological:      Mental Status: He is alert and oriented to person, place, and time. Cranial Nerves: No cranial nerve deficit. Deep Tendon Reflexes: Reflexes are normal and symmetric. An electronic signature was used to authenticate this note.     --Junito Mar MD

## 2021-10-29 ENCOUNTER — NURSE ONLY (OUTPATIENT)
Dept: FAMILY MEDICINE CLINIC | Age: 71
End: 2021-10-29

## 2021-10-29 VITALS — HEART RATE: 74 BPM | SYSTOLIC BLOOD PRESSURE: 138 MMHG | DIASTOLIC BLOOD PRESSURE: 82 MMHG

## 2021-10-29 NOTE — TELEPHONE ENCOUNTER
Pt in for Blood Pressure Check    BP: 138/74  Site: right upper arm  Position:sititing  Cuff: large  Pulse:74      Stated Dr. Akbar Mcdermott increased his losartan to 100 mg once a day. [facility]      Date of last visit:  10/18/2021  Date of next visit:  4/20/2022    Requested Prescriptions     Pending Prescriptions Disp Refills    losartan (COZAAR) 100 MG tablet 90 tablet 1     Sig: Take 1 tablet by mouth daily

## 2021-10-30 RX ORDER — LOSARTAN POTASSIUM 100 MG/1
100 TABLET ORAL DAILY
Qty: 90 TABLET | Refills: 1 | Status: SHIPPED | OUTPATIENT
Start: 2021-10-30 | End: 2022-04-12

## 2021-12-08 ENCOUNTER — OFFICE VISIT (OUTPATIENT)
Dept: INTERNAL MEDICINE CLINIC | Age: 71
End: 2021-12-08
Payer: MEDICARE

## 2021-12-08 VITALS
SYSTOLIC BLOOD PRESSURE: 136 MMHG | BODY MASS INDEX: 33.6 KG/M2 | HEIGHT: 71 IN | DIASTOLIC BLOOD PRESSURE: 76 MMHG | WEIGHT: 240 LBS

## 2021-12-08 DIAGNOSIS — I10 ESSENTIAL HYPERTENSION: Primary | ICD-10-CM

## 2021-12-08 DIAGNOSIS — R35.0 URINARY FREQUENCY: ICD-10-CM

## 2021-12-08 DIAGNOSIS — E78.5 HYPERLIPIDEMIA, UNSPECIFIED HYPERLIPIDEMIA TYPE: ICD-10-CM

## 2021-12-08 DIAGNOSIS — I25.810 CORONARY ARTERY DISEASE INVOLVING AUTOLOGOUS VEIN CORONARY BYPASS GRAFT WITHOUT ANGINA PECTORIS: ICD-10-CM

## 2021-12-08 PROCEDURE — 93000 ELECTROCARDIOGRAM COMPLETE: CPT | Performed by: NURSE PRACTITIONER

## 2021-12-08 PROCEDURE — 99214 OFFICE O/P EST MOD 30 MIN: CPT | Performed by: NURSE PRACTITIONER

## 2021-12-08 ASSESSMENT — ENCOUNTER SYMPTOMS
TROUBLE SWALLOWING: 0
CONSTIPATION: 0
NAUSEA: 0
ABDOMINAL DISTENTION: 0
DIARRHEA: 0
PHOTOPHOBIA: 0
SHORTNESS OF BREATH: 0
COUGH: 0
SORE THROAT: 0
SINUS PRESSURE: 0
ABDOMINAL PAIN: 0
SINUS PAIN: 0
BLOOD IN STOOL: 0
WHEEZING: 0
RHINORRHEA: 0

## 2021-12-08 NOTE — PROGRESS NOTES
[facility]NAGEMENT  [facility]  Dept: [phone]  Dept Fax: [phone]: [phone]     Visit Date:  2021    Patient:  Samantha Wu  YOB: 1950    HPI:     Chief Complaint   Patient presents with    6 Month Follow-Up    Hypertension    Coronary Artery Disease     Aftab Massey (:  1950) is a 73 y. o. male, here for evaluation of the following medical concerns: CAD, HTN, Hyperlipidemia, and urinary frequency.      I last seen Ned 6 months ago. He follows routinely with Dr. Faby Michael.     CAD/HTN-  He reports he is taking medications as instructed, with no medication side effects. He does not perform home BP monitoring. Reports occasional shortness of breath on exertion which is not new and is unchanged. Denies chest pain, swelling of ankles, palpitations, and orthostatic dizziness or lightheadedness. /76 today in office. Stress test in 2019, normal. Cardiac catheterization completed in  post CABG. EKG completed and reviewed, WNL.      Hyperlipidemia-  He states he is taking atorvastatin 40 mg daily as ordered. Lifestyle modifications including heart healthy diet, weight loss and exercise are/are not being followed.  Last lipid profile shows LDL 39 < 100 (<70 with CVD), triglycerides 196 <150, HDL 29 >40 (male, HDL >50 female) in 3/2021.      The ASCVD Risk score (Jeralene Brunner., et al., 2013) failed to calculate for the following reasons: The valid total cholesterol range is 130 to 320 mg/dL     Urinary Frequency-  Urinary frequency has improved. He denies any dysuria or hematuria. No fevers.  No urinary incontinence. PSA 4.13 on 21. Follows with urology routinely.     Medications    Current Outpatient Medications:     losartan (COZAAR) 100 MG tablet, Take 1 tablet by mouth daily, Disp: 90 tablet, Rfl: 1    metoprolol tartrate (LOPRESSOR) 25 MG tablet, TAKE 1 TABLET TWICE A DAY, Disp: 14 tablet, Rfl: 0    vitamin D (ERGOCALCIFEROL) 1.25 MG (20485 UT) CAPS capsule, TAKE 1 CAPSULE ONCE A WEEK, Disp: 12 capsule, Rfl: 3    atorvastatin (LIPITOR) 40 MG tablet, TAKE 1 TABLET DAILY, Disp: 90 tablet, Rfl: 3    Ascorbic Acid (VITAMIN C) 250 MG tablet, Take 250 mg by mouth daily, Disp: , Rfl:     aspirin 81 MG EC tablet, Take 81 mg by mouth 2 times daily , Disp: , Rfl:     acetaminophen (TYLENOL ARTHRITIS PAIN) 650 MG extended release tablet, Take 650 mg by mouth every 8 hours as needed for Pain, Disp: , Rfl:     sodium chloride (OCEAN, BABY AYR) 0.65 % nasal spray, 1 spray by Nasal route as needed for Congestion, Disp: , Rfl:     Multiple Vitamin (MULTI-VITAMIN) TABS, Take by mouth daily , Disp: , Rfl:     ciprofloxacin (CIPRO) 500 MG tablet, Take 1 tablet by mouth 2 times daily for 1 day, Disp: 2 tablet, Rfl: 0    ciprofloxacin (CIPRO) 500 MG tablet, Take 1 tablet by mouth 2 times daily for 2 days, Disp: 4 tablet, Rfl: 0    ketorolac (TORADOL) 10 MG tablet, Take 1 tablet by mouth every 6 hours as needed for Pain, Disp: 15 tablet, Rfl: 0    tamsulosin (FLOMAX) 0.4 MG capsule, Take 1 capsule by mouth daily for 10 days, Disp: 10 capsule, Rfl: 0    ketorolac (TORADOL) 10 MG tablet, Take 1 tablet by mouth 3 times daily as needed for Pain, Disp: 20 tablet, Rfl: 0    ondansetron (ZOFRAN) 4 MG tablet, Take 1 tablet by mouth every 8 hours as needed for Nausea, Disp: 10 tablet, Rfl: 0    The patient has No Known Allergies. Past Medical History  Carmita Rubin  has a past medical history of Arthritis, ASHD (arteriosclerotic heart disease), CAD (coronary artery disease), Colon polyps, Decreased sense of smell, Epistaxis, History of blood transfusion, Hyperlipidemia, Hypertension, Mild carotid artery disease ([facility] [address] Nasal obstruction, and Nasal septal perforation.     Past Surgical History  The patient  has a past surgical history that includes Colonoscopy (12/2019); Coronary artery bypass graft (11/2005); Cardiac surgery (2005); Tonsillectomy; Nasal polyp surgery (06/05/2015); Colonoscopy (Left, 09/13/2019); Excision of Aural Mass; Skin cancer excision (2018); and Bladder surgery (Right, 12/20/2021). Family History  This patient's family history includes Asthma in his brother; Cancer in his father; Diabetes in his sister; Heart Disease in his mother. Social History  Zachary Mckay  reports that he quit smoking about 19 years ago. His smoking use included cigarettes. He has a 45.00 pack-year smoking history. He has never used smokeless tobacco. He reports current alcohol use. He reports that he does not use drugs. Health Maintenance:    Health Maintenance   Topic Date Due    DTaP/Tdap/Td vaccine (1 - Tdap) Never done    Shingles Vaccine (2 of 3) 08/14/2009    Pneumococcal 65+ years Vaccine (2 of 2 - PPSV23) 08/27/2014    Annual Wellness Visit (AWV)  10/19/2022    Lipid screen  12/17/2022    PSA counseling  12/17/2022    Potassium monitoring  12/24/2022    Creatinine monitoring  12/24/2022    Colon cancer screen colonoscopy  09/13/2029    Flu vaccine  Completed    COVID-19 Vaccine  Completed    AAA screen  Completed    Hepatitis C screen  Completed    Hepatitis A vaccine  Aged Out    Hepatitis B vaccine  Aged Out    Hib vaccine  Aged Out    Meningococcal (ACWY) vaccine  Aged Out       Subjective:      Review of Systems   Constitutional: Negative for chills, fatigue and fever. HENT: Negative for congestion, rhinorrhea, sinus pressure, sinus pain, sore throat, tinnitus and trouble swallowing. Eyes: Negative for photophobia and visual disturbance. Respiratory: Negative for cough, shortness of breath and wheezing. Cardiovascular: Negative for chest pain, palpitations and leg swelling. Gastrointestinal: Negative for abdominal distention, abdominal pain, blood in stool, constipation, diarrhea and nausea.    Genitourinary: Negative for difficulty urinating, dysuria, frequency and hematuria. Musculoskeletal: Negative for arthralgias and myalgias. Skin: Negative for rash and wound. Neurological: Negative for dizziness, light-headedness and headaches. Psychiatric/Behavioral: Negative for dysphoric mood and sleep disturbance. The patient is not nervous/anxious. Objective:     /76 (Site: Left Upper Arm, Position: Sitting, Cuff Size: Medium Adult)   Ht 5' 11\" (1.803 m)   Wt 240 lb (108.9 kg)   BMI 33.47 kg/m²     Physical Exam  Constitutional:       General: He is not in acute distress. Appearance: Normal appearance. He is well-developed. He is not ill-appearing. HENT:      Head: Normocephalic and atraumatic. Right Ear: Tympanic membrane, ear canal and external ear normal.      Left Ear: Tympanic membrane, ear canal and external ear normal.      Nose: Nose normal. No congestion or rhinorrhea. Mouth/Throat:      Mouth: Mucous membranes are moist.      Pharynx: Oropharynx is clear. No oropharyngeal exudate or posterior oropharyngeal erythema. Eyes:      Extraocular Movements: Extraocular movements intact. Conjunctiva/sclera: Conjunctivae normal.      Pupils: Pupils are equal, round, and reactive to light. Neck:      Thyroid: No thyromegaly. Vascular: No JVD. Cardiovascular:      Rate and Rhythm: Normal rate and regular rhythm. Pulses: Normal pulses. Heart sounds: Normal heart sounds. No murmur heard. No friction rub. No gallop. Pulmonary:      Effort: Pulmonary effort is normal. No respiratory distress. Breath sounds: Normal breath sounds. No wheezing, rhonchi or rales. Abdominal:      General: Bowel sounds are normal. There is no distension. Palpations: Abdomen is soft. Tenderness: There is no abdominal tenderness. Musculoskeletal:         General: Normal range of motion. Cervical back: Normal range of motion and neck supple. Right lower leg: No edema. understanding. Reviewed health maintenance.        Electronically signed RASHAD Guerrero CNP on 12/8/21 at 10:01 AM EST

## 2021-12-17 ENCOUNTER — VIRTUAL VISIT (OUTPATIENT)
Dept: UROLOGY | Age: 71
End: 2021-12-17
Payer: MEDICARE

## 2021-12-17 ENCOUNTER — PREP FOR PROCEDURE (OUTPATIENT)
Dept: UROLOGY | Age: 71
End: 2021-12-17

## 2021-12-17 ENCOUNTER — TELEPHONE (OUTPATIENT)
Dept: UROLOGY | Age: 71
End: 2021-12-17

## 2021-12-17 ENCOUNTER — HOSPITAL ENCOUNTER (EMERGENCY)
Age: 71
Discharge: HOME | End: 2021-12-17
Payer: MEDICARE

## 2021-12-17 ENCOUNTER — APPOINTMENT (OUTPATIENT)
Dept: CT IMAGING | Age: 71
End: 2021-12-17
Payer: MEDICARE

## 2021-12-17 VITALS
RESPIRATION RATE: 19 BRPM | DIASTOLIC BLOOD PRESSURE: 82 MMHG | HEART RATE: 70 BPM | OXYGEN SATURATION: 95 % | TEMPERATURE: 97.6 F | SYSTOLIC BLOOD PRESSURE: 168 MMHG

## 2021-12-17 DIAGNOSIS — R10.30 LOWER ABDOMINAL PAIN: Primary | ICD-10-CM

## 2021-12-17 DIAGNOSIS — N20.2 CALCULUS OF KIDNEY WITH CALCULUS OF URETER: Primary | ICD-10-CM

## 2021-12-17 LAB
ANION GAP SERPL CALCULATED.3IONS-SCNC: 14 MEQ/L (ref 8–16)
BACTERIA: ABNORMAL /HPF
BASOPHILS # BLD: 0.3 %
BASOPHILS ABSOLUTE: 0 THOU/MM3 (ref 0–0.1)
BILIRUBIN URINE: NEGATIVE
BLOOD, URINE: ABNORMAL
BUN BLDV-MCNC: 22 MG/DL (ref 7–22)
CALCIUM SERPL-MCNC: 9.5 MG/DL (ref 8.5–10.5)
CASTS 2: ABNORMAL /LPF
CASTS UA: ABNORMAL /LPF
CHARACTER, URINE: CLEAR
CHLORIDE BLD-SCNC: 102 MEQ/L (ref 98–111)
CHOLESTEROL, TOTAL: 108 MG/DL (ref 100–199)
CO2: 22 MEQ/L (ref 23–33)
COLOR: YELLOW
CREAT SERPL-MCNC: 1.3 MG/DL (ref 0.4–1.2)
CRYSTALS, UA: ABNORMAL
EOSINOPHIL # BLD: 0.6 %
EOSINOPHILS ABSOLUTE: 0.1 THOU/MM3 (ref 0–0.4)
EPITHELIAL CELLS, UA: ABNORMAL /HPF
ERYTHROCYTE [DISTWIDTH] IN BLOOD BY AUTOMATED COUNT: 13.4 % (ref 11.5–14.5)
ERYTHROCYTE [DISTWIDTH] IN BLOOD BY AUTOMATED COUNT: 47.5 FL (ref 35–45)
GFR SERPL CREATININE-BSD FRML MDRD: 54 ML/MIN/1.73M2
GLUCOSE BLD-MCNC: 134 MG/DL (ref 70–108)
GLUCOSE URINE: NEGATIVE MG/DL
HCT VFR BLD CALC: 44.4 % (ref 42–52)
HDLC SERPL-MCNC: 35 MG/DL
HEMOGLOBIN: 14.8 GM/DL (ref 14–18)
IMMATURE GRANS (ABS): 0.06 THOU/MM3 (ref 0–0.07)
IMMATURE GRANULOCYTES: 0.4 %
KETONES, URINE: NEGATIVE
LDL CHOLESTEROL CALCULATED: 48 MG/DL
LEUKOCYTE ESTERASE, URINE: NEGATIVE
LYMPHOCYTES # BLD: 14.1 %
LYMPHOCYTES ABSOLUTE: 2.2 THOU/MM3 (ref 1–4.8)
MCH RBC QN AUTO: 32.8 PG (ref 26–33)
MCHC RBC AUTO-ENTMCNC: 33.3 GM/DL (ref 32.2–35.5)
MCV RBC AUTO: 98.4 FL (ref 80–94)
MISCELLANEOUS 2: ABNORMAL
MONOCYTES # BLD: 6.3 %
MONOCYTES ABSOLUTE: 1 THOU/MM3 (ref 0.4–1.3)
NITRITE, URINE: NEGATIVE
NUCLEATED RED BLOOD CELLS: 0 /100 WBC
OSMOLALITY CALCULATION: 281 MOSMOL/KG (ref 275–300)
PH UA: 5.5 (ref 5–9)
PLATELET # BLD: 183 THOU/MM3 (ref 130–400)
PMV BLD AUTO: 10.6 FL (ref 9.4–12.4)
POTASSIUM SERPL-SCNC: 4.9 MEQ/L (ref 3.5–5.2)
PROSTATE SPECIFIC ANTIGEN: 5.11 NG/ML (ref 0–1)
PROTEIN UA: NEGATIVE
RBC # BLD: 4.51 MILL/MM3 (ref 4.7–6.1)
RBC URINE: ABNORMAL /HPF
REASON FOR REJECTION: NORMAL
REASON FOR REJECTION: NORMAL
REJECTED TEST: NORMAL
REJECTED TEST: NORMAL
RENAL EPITHELIAL, UA: ABNORMAL
SEG NEUTROPHILS: 78.3 %
SEGMENTED NEUTROPHILS ABSOLUTE COUNT: 12.3 THOU/MM3 (ref 1.8–7.7)
SODIUM BLD-SCNC: 138 MEQ/L (ref 135–145)
SPECIFIC GRAVITY, URINE: 1.02 (ref 1–1.03)
TRIGL SERPL-MCNC: 124 MG/DL (ref 0–199)
UROBILINOGEN, URINE: 0.2 EU/DL (ref 0–1)
WBC # BLD: 15.7 THOU/MM3 (ref 4.8–10.8)
WBC UA: ABNORMAL /HPF
YEAST: ABNORMAL

## 2021-12-17 PROCEDURE — 80048 BASIC METABOLIC PNL TOTAL CA: CPT

## 2021-12-17 PROCEDURE — 96375 TX/PRO/DX INJ NEW DRUG ADDON: CPT

## 2021-12-17 PROCEDURE — 80061 LIPID PANEL: CPT

## 2021-12-17 PROCEDURE — 99442 PR PHYS/QHP TELEPHONE EVALUATION 11-20 MIN: CPT | Performed by: NURSE PRACTITIONER

## 2021-12-17 PROCEDURE — 96374 THER/PROPH/DIAG INJ IV PUSH: CPT

## 2021-12-17 PROCEDURE — 6360000002 HC RX W HCPCS: Performed by: NURSE PRACTITIONER

## 2021-12-17 PROCEDURE — 6370000000 HC RX 637 (ALT 250 FOR IP): Performed by: PHYSICIAN ASSISTANT

## 2021-12-17 PROCEDURE — 99284 EMERGENCY DEPT VISIT MOD MDM: CPT

## 2021-12-17 PROCEDURE — 81001 URINALYSIS AUTO W/SCOPE: CPT

## 2021-12-17 PROCEDURE — 74176 CT ABD & PELVIS W/O CONTRAST: CPT

## 2021-12-17 PROCEDURE — 84153 ASSAY OF PSA TOTAL: CPT

## 2021-12-17 PROCEDURE — 85025 COMPLETE CBC W/AUTO DIFF WBC: CPT

## 2021-12-17 RX ORDER — KETOROLAC TROMETHAMINE 10 MG/1
10 TABLET, FILM COATED ORAL 3 TIMES DAILY PRN
Qty: 20 TABLET | Refills: 0 | Status: ON HOLD | OUTPATIENT
Start: 2021-12-17 | End: 2022-01-31

## 2021-12-17 RX ORDER — KETOROLAC TROMETHAMINE 30 MG/ML
15 INJECTION, SOLUTION INTRAMUSCULAR; INTRAVENOUS ONCE
Status: COMPLETED | OUTPATIENT
Start: 2021-12-17 | End: 2021-12-17

## 2021-12-17 RX ORDER — SODIUM CHLORIDE 9 MG/ML
INJECTION, SOLUTION INTRAVENOUS CONTINUOUS
Status: CANCELLED | OUTPATIENT
Start: 2021-12-20

## 2021-12-17 RX ORDER — TAMSULOSIN HYDROCHLORIDE 0.4 MG/1
0.4 CAPSULE ORAL DAILY
Qty: 10 CAPSULE | Refills: 0 | Status: SHIPPED | OUTPATIENT
Start: 2021-12-17 | End: 2022-01-27 | Stop reason: ALTCHOICE

## 2021-12-17 RX ORDER — TAMSULOSIN HYDROCHLORIDE 0.4 MG/1
0.4 CAPSULE ORAL DAILY
Status: COMPLETED | OUTPATIENT
Start: 2021-12-17 | End: 2021-12-17

## 2021-12-17 RX ORDER — ONDANSETRON 2 MG/ML
4 INJECTION INTRAMUSCULAR; INTRAVENOUS ONCE
Status: COMPLETED | OUTPATIENT
Start: 2021-12-17 | End: 2021-12-17

## 2021-12-17 RX ORDER — ONDANSETRON 4 MG/1
4 TABLET, FILM COATED ORAL EVERY 8 HOURS PRN
Qty: 10 TABLET | Refills: 0 | Status: SHIPPED | OUTPATIENT
Start: 2021-12-17 | End: 2021-12-27

## 2021-12-17 RX ADMIN — ONDANSETRON 4 MG: 2 INJECTION INTRAMUSCULAR; INTRAVENOUS at 05:54

## 2021-12-17 RX ADMIN — TAMSULOSIN HYDROCHLORIDE 0.4 MG: 0.4 CAPSULE ORAL at 07:11

## 2021-12-17 RX ADMIN — KETOROLAC TROMETHAMINE 15 MG: 30 INJECTION, SOLUTION INTRAMUSCULAR; INTRAVENOUS at 05:42

## 2021-12-17 ASSESSMENT — ENCOUNTER SYMPTOMS
BACK PAIN: 0
VOMITING: 1
ABDOMINAL PAIN: 1
NAUSEA: 1

## 2021-12-17 ASSESSMENT — PAIN SCALES - GENERAL: PAINLEVEL_OUTOF10: 6

## 2021-12-17 NOTE — TELEPHONE ENCOUNTER
Patient scheduled for surgery with Dr Ruben Soto on 12/20/21. Surgery consent on arrival. Patient does not need any pre op testing. Surgery instructions gone over verbally, Arrival to [facility] at 1130 am, NPO after midnight, have a , Hold the asa and vitamins now.

## 2021-12-17 NOTE — ED TRIAGE NOTES
Patient presents to ER for chief complaint of RLQ abdominal pain x6 hours as well as right sided lower back pain x6 hours. Patient states this stomach pain started right after his last bowel movement at approximately 1095-4949. Patient states this BM was not his normal type, it was \"hard. \" Patient denies blood in stool. No dysuria, hematuria per patient, but does state a history of kidney stones. Patient AOx4, pwd. Breathing without difficulty on room air.

## 2021-12-17 NOTE — TELEPHONE ENCOUNTER
SURGERY [address]  [address] [place] [address] [address] [place], [address]      Phone *[phone] *[phone]   Surgical Scheduling Direct Line Phone *[phone] Fax *[phone]      [employer] 1950 male    [address]  [place] [address]   Marital Status:          Home Phone: [phone]      Cell Phone:    Telephone Information:   Mobile [phone]          Surgeon: Dr. Yonatan Chand Surgery Date: 12/20/21   Time: 1:30 pm    Procedure: Cystoscopy, Right Retrograde Pyelogram,Possible Ureteral Dilation,Possible Ureteral Biospy, Possible Right Ureteral Stent Placement    Diagnosis: Hydronephrosis     Important Medical History:  In [facility]    Special Inst/Equip:     CPT Codes:    22921  Latex Allergy: No     Cardiac Device:  No    Anesthesia:  General          Admission Type:  Same Day                        Admit Prior to Day of Surgery: No    Case Location:  Main OR            Preadmission Testing:  Phone Call          PAT Date and Time:______________________________________________________    PAT Confirmation #: ______________________________________________________    Post Op Visit: ___________________________________________________________    Need Preop Cardiac Clearance: No    Does Patient have Cardiologist/physician?      None    Surgery Confirmation #: __________________________________________________    : ________________________   Date: __________________________     [employer] Name: [facility]

## 2021-12-17 NOTE — ED NOTES
ED nurse-to-nurse bedside report    Chief Complaint   Patient presents with    Abdominal Pain     RLQ pain x6 hours    Back Pain     Right sided x6 hours      LOC: alert and orientated to name, place, date  Vital signs   Vitals:    12/17/21 0454 12/17/21 0650   BP: (!) 162/85 (!) 168/82   Pulse: 62 70   Resp: 18 19   Temp: 97.6 °F (36.4 °C)    TempSrc: Oral    SpO2: 96% 95%      Pain:    Pain Interventions: toradol   Pain Goal: 3/10  Oxygen: No    Current needs required RA   Telemetry: No  LDAs:   Peripheral IV 12/17/21 Right Forearm (Active)   Site Assessment Clean; Dry; Intact 12/17/21 0518   Line Status Blood return noted; Flushed; Specimen collected 12/17/21 0518   Dressing Status Clean; Dry; Intact 12/17/21 0518   Dressing Intervention New 12/17/21 0518       Peripheral IV 12/17/21 Left Wrist (Active)   Site Assessment Clean; Dry; Intact 12/17/21 0545   Line Status Blood return noted; Brisk blood return; Flushed; Specimen collected 12/17/21 0545   Dressing Status Clean; Dry; Intact 12/17/21 0545   Dressing Intervention New 12/17/21 0545     Continuous Infusions:   Mobility: Requires assistance * 1  Sunshine Fall Risk Score: Fall Risk 10/18/2021 12/8/2020 9/16/2019 5/14/2018 1/8/2018 11/7/2016 11/4/2015   2 or more falls in past year? no no no no no no no   Fall with injury in past year? no no no no no no no     Fall Interventions: Side rails up x2, call light within reach  Report given to:  Chris Reilly RN  12/17/21 7488

## 2021-12-17 NOTE — ED NOTES
Telephone lab/chemistry at this time to notify them of sending a PSA and lipid panel tube for lab. Contacted them to have them add on a BMP with that same tube since the first one sent was rejected.       Yifan Velasquez RN  12/17/21 2934

## 2021-12-17 NOTE — ED NOTES
Patient resting in semi fowlers position, breathing easy and unlabored. VSS. Call light within reach. Wife at bedside. No new concerns voiced.       Xochitl Quinn RN  12/17/21 5507

## 2021-12-17 NOTE — PROGRESS NOTES
Nancy Lopez is a 66 y.o. male evaluated via telephone on 12/17/2021. Consent:  He and/or health care decision maker is aware that that he may receive a bill for this telephone service, depending on his insurance coverage, and has provided verbal consent to proceed: Yes      Documentation:  I communicated with the patient and/or health care decision maker about hydronephrosis and abdominal pain. Details of this discussion including any medical advice provided:         [address] [address]  Dept: [phone]  Loc: [phone]    Visit Date: 12/17/2021        HPI:     Nancy Lopez is a 66 y.o. male who presents today for:  Chief Complaint   Patient presents with    Follow-up     ER follow up for pain and R hydronephrosis       HPI   Pt seen in ER follow up for R hydronephrosis. Pt reports he woke up last night at 10:30 pm with bilateral lower abdominal pain 8/10.  +Nausea. Emesis x 2. Hot flashes. Afebrile. No gross hematuria. Pain currently controlled after analgesia in the ER. CT with moderate R hydronephrosis with transition point in distal r ureter just proximal to the R UVJ, nonobstructive left renal calculus, enlarged prostate gland. CRT 1.3 baseline 0.8-0. 98. WBC 15.7.  UA 3-5 RBCs. PSA 5.11. Has passed kidney stones in the past and has a known left renal stone. Denies gross hematuria and reports normal urinary habits. Hx of smoking 1 ppd for 30 years. Quit smoking ~ 20 years ago. Follows with Dr. Soha Garcia (last OV 8/19/21) for BPH, elevated psa, kidney stones.      Current Outpatient Medications   Medication Sig Dispense Refill    tamsulosin (FLOMAX) 0.4 MG capsule Take 1 capsule by mouth daily for 10 days 10 capsule 0    ketorolac (TORADOL) 10 MG tablet Take 1 tablet by mouth 3 times daily as needed for Pain 20 tablet 0    ondansetron (ZOFRAN) 4 MG tablet Take 1 tablet by mouth every 8 hours as needed for Nausea 10 tablet 0    losartan (COZAAR) 100 MG tablet Take 1 tablet by mouth daily 90 tablet 1    metoprolol tartrate (LOPRESSOR) 25 MG tablet TAKE 1 TABLET TWICE A DAY 14 tablet 0    vitamin D (ERGOCALCIFEROL) 1.25 MG (82294 UT) CAPS capsule TAKE 1 CAPSULE ONCE A WEEK 12 capsule 3    atorvastatin (LIPITOR) 40 MG tablet TAKE 1 TABLET DAILY 90 tablet 3    Ascorbic Acid (VITAMIN C) 250 MG tablet Take 250 mg by mouth daily      aspirin 81 MG EC tablet Take 81 mg by mouth 2 times daily       acetaminophen (TYLENOL ARTHRITIS PAIN) 650 MG extended release tablet Take 650 mg by mouth every 8 hours as needed for Pain      sodium chloride (OCEAN, BABY AYR) 0.65 % nasal spray 1 spray by Nasal route as needed for Congestion      Multiple Vitamin (MULTI-VITAMIN) TABS Take by mouth daily        No current facility-administered medications for this visit. Past Medical History  Elizabeth Morrow  has a past medical history of Arthritis, ASHD (arteriosclerotic heart disease), CAD (coronary artery disease), Colon polyps, Decreased sense of smell, Epistaxis, History of blood transfusion, Hyperlipidemia, Hypertension, Mild carotid artery disease ([facility] [address] Nasal obstruction, and Nasal septal perforation. Past Surgical History  The patient  has a past surgical history that includes Colonoscopy (12/2019); Coronary artery bypass graft (11/2005); Cardiac surgery (2005); Tonsillectomy; Nasal polyp surgery (06/05/2015); Colonoscopy (Left, 09/13/2019); Excision of Aural Mass; and Skin cancer excision (2018). Family History  This patient's family history includes Asthma in his brother; Cancer in his father; Diabetes in his sister; Heart Disease in his mother. Social History  Elizabeth Morrow  reports that he quit smoking about 19 years ago. His smoking use included cigarettes. He has a 45.00 pack-year smoking history. He has never used smokeless tobacco. He reports current alcohol use.  He reports that he does not use drugs. Subjective:      Review of Systems   Constitutional: Negative for activity change, appetite change, chills, diaphoresis, fatigue, fever and unexpected weight change. Gastrointestinal: Positive for abdominal pain, nausea and vomiting. Genitourinary: Positive for hematuria (micro). Negative for decreased urine volume, difficulty urinating, dysuria, flank pain, frequency and urgency. Musculoskeletal: Negative for back pain. Objective: There were no vitals taken for this visit. Physical Exam  Unable to perform as visit completed via telephone. POC  No results found for this visit on 12/17/21. Patients recent PSA values are as follows  Lab Results   Component Value Date    PSA 5.11 (H) 12/17/2021    PSA 3.89 (H) 07/10/2019    PSA 4.18 (H) 05/17/2018        Recent BUN/Creatinine:  Lab Results   Component Value Date    BUN 22 12/17/2021    CREATININE 1.3 12/17/2021       Radiology  The patient has had a CT abd/pelvis 12/17/21 which I have reviewed along with its accompanying report. The study demonstrates moderate R hydronephrosis with transition point in distal r ureter just proximal to the R UVJ. Nonobstructive left renal calculus 6.5-7 mm in size. Enlarged prostate gland. I was not able to appreciate a ureteral calculus on my review of images. Assessment:   R moderate hydroureteronephrosis with transition point in distal ureter  Bilateral lower abdominal pain  Micro hematuria  Hx of tobacco use 30 pack years  L nonobstructive nephrolithiasis (6.5-7 mm in size)  BPH  Elevated PSA    Plan:     R hydroureteronephrosis and micro hematuria in pt with hx of tobacco use. There is a transition point in distal ureter. Discussed with pt and wife regarding further evaluation with retrograde pyelogram and possible ureteroscopy for direct visualization. Discussed option of conservative management with repeat renal US in a few weeks.  Pt would like to proceed with further evaluation with direct visualization. I described the procedure in detail and also described the associated risks and benefits at length. We discussed possible alternative therapies. We discussed the risks and benefits of not undergoing therapy. Patient understands these risks and benefits and desires to proceed. We will schedule Kaleigh Salazar for a cystoscopy, R retrograde pyelogram, possible R ureteroscopy, possible R ureteral dilation, possible R ureteral biopsy, possible R ureteral stent placement in the OR with Dr. Nadeen Murcia on Monday 12/20/21. Office will contact him to facilitate scheduling. Case and imaging reviewed with Dr. Nadeen Murcia. Pt will need repeat PSA in follow-up. Continue flomax, zofran, toradol. Pt aware to present to ER for any fever, intractable pain, intractable n/v.        I affirm this is a Patient Initiated Episode with a Patient who has not had a related appointment within my department in the past 7 days or scheduled within the next 24 hours. Patient identification was verified at the start of the visit: Yes    Total Time: minutes: 11-20 minutes    The visit was conducted pursuant to the emergency declaration under the [address] [facility], [address] [place] [address] [facility] authority and the [employer] General Act. Patient identification was verified, and a caregiver was present when appropriate. The patient was located in a state where the provider was credentialed to provide care.     Note: not billable if this call serves to triage the patient into an appointment for the relevant concern      RASHAD Quigley - JUAN

## 2021-12-17 NOTE — TELEPHONE ENCOUNTER
No pre cert needed for CPT 58497 per [facility] with [employer]   Ref # [id number]

## 2021-12-20 ENCOUNTER — ANESTHESIA (OUTPATIENT)
Dept: OPERATING ROOM | Age: 71
End: 2021-12-20
Payer: MEDICARE

## 2021-12-20 ENCOUNTER — APPOINTMENT (OUTPATIENT)
Dept: GENERAL RADIOLOGY | Age: 71
End: 2021-12-20
Attending: UROLOGY
Payer: MEDICARE

## 2021-12-20 ENCOUNTER — HOSPITAL ENCOUNTER (OUTPATIENT)
Age: 71
Setting detail: OUTPATIENT SURGERY
Discharge: HOME | End: 2021-12-20
Attending: UROLOGY | Admitting: UROLOGY
Payer: MEDICARE

## 2021-12-20 ENCOUNTER — ANESTHESIA EVENT (OUTPATIENT)
Dept: OPERATING ROOM | Age: 71
End: 2021-12-20
Payer: MEDICARE

## 2021-12-20 VITALS
OXYGEN SATURATION: 97 % | SYSTOLIC BLOOD PRESSURE: 182 MMHG | RESPIRATION RATE: 16 BRPM | DIASTOLIC BLOOD PRESSURE: 83 MMHG | HEART RATE: 59 BPM | TEMPERATURE: 96.8 F

## 2021-12-20 VITALS — SYSTOLIC BLOOD PRESSURE: 113 MMHG | DIASTOLIC BLOOD PRESSURE: 60 MMHG | OXYGEN SATURATION: 98 %

## 2021-12-20 PROCEDURE — 7100000010 HC PHASE II RECOVERY - FIRST 15 MIN: Performed by: UROLOGY

## 2021-12-20 PROCEDURE — 74420 UROGRAPHY RTRGR +-KUB: CPT

## 2021-12-20 PROCEDURE — C2617 STENT, NON-COR, TEM W/O DEL: HCPCS | Performed by: UROLOGY

## 2021-12-20 PROCEDURE — 7100000000 HC PACU RECOVERY - FIRST 15 MIN: Performed by: UROLOGY

## 2021-12-20 PROCEDURE — 71046 X-RAY EXAM CHEST 2 VIEWS: CPT

## 2021-12-20 PROCEDURE — 88305 TISSUE EXAM BY PATHOLOGIST: CPT

## 2021-12-20 PROCEDURE — 6360000002 HC RX W HCPCS: Performed by: UROLOGY

## 2021-12-20 PROCEDURE — C1726 CATH, BAL DIL, NON-VASCULAR: HCPCS | Performed by: UROLOGY

## 2021-12-20 PROCEDURE — 3600000003 HC SURGERY LEVEL 3 BASE: Performed by: UROLOGY

## 2021-12-20 PROCEDURE — 3700000000 HC ANESTHESIA ATTENDED CARE: Performed by: UROLOGY

## 2021-12-20 PROCEDURE — 2720000010 HC SURG SUPPLY STERILE: Performed by: UROLOGY

## 2021-12-20 PROCEDURE — 6360000002 HC RX W HCPCS: Performed by: NURSE ANESTHETIST, CERTIFIED REGISTERED

## 2021-12-20 PROCEDURE — 7100000011 HC PHASE II RECOVERY - ADDTL 15 MIN: Performed by: UROLOGY

## 2021-12-20 PROCEDURE — 2580000003 HC RX 258: Performed by: UROLOGY

## 2021-12-20 PROCEDURE — 2500000003 HC RX 250 WO HCPCS: Performed by: NURSE ANESTHETIST, CERTIFIED REGISTERED

## 2021-12-20 PROCEDURE — 2709999900 HC NON-CHARGEABLE SUPPLY: Performed by: UROLOGY

## 2021-12-20 PROCEDURE — 3700000001 HC ADD 15 MINUTES (ANESTHESIA): Performed by: UROLOGY

## 2021-12-20 PROCEDURE — C1769 GUIDE WIRE: HCPCS | Performed by: UROLOGY

## 2021-12-20 PROCEDURE — 7100000001 HC PACU RECOVERY - ADDTL 15 MIN: Performed by: UROLOGY

## 2021-12-20 PROCEDURE — 2580000003 HC RX 258: Performed by: NURSE ANESTHETIST, CERTIFIED REGISTERED

## 2021-12-20 PROCEDURE — 3600000013 HC SURGERY LEVEL 3 ADDTL 15MIN: Performed by: UROLOGY

## 2021-12-20 DEVICE — URETERAL STENT
Type: IMPLANTABLE DEVICE | Status: FUNCTIONAL
Brand: PERCUFLEX™ PLUS

## 2021-12-20 RX ORDER — FENTANYL CITRATE 50 UG/ML
25 INJECTION, SOLUTION INTRAMUSCULAR; INTRAVENOUS EVERY 5 MIN PRN
Status: DISCONTINUED | OUTPATIENT
Start: 2021-12-20 | End: 2021-12-20 | Stop reason: HOSPADM

## 2021-12-20 RX ORDER — MEPERIDINE HYDROCHLORIDE 25 MG/ML
12.5 INJECTION INTRAMUSCULAR; INTRAVENOUS; SUBCUTANEOUS EVERY 5 MIN PRN
Status: DISCONTINUED | OUTPATIENT
Start: 2021-12-20 | End: 2021-12-20 | Stop reason: HOSPADM

## 2021-12-20 RX ORDER — SODIUM CHLORIDE, SODIUM LACTATE, POTASSIUM CHLORIDE, CALCIUM CHLORIDE 600; 310; 30; 20 MG/100ML; MG/100ML; MG/100ML; MG/100ML
INJECTION, SOLUTION INTRAVENOUS CONTINUOUS PRN
Status: DISCONTINUED | OUTPATIENT
Start: 2021-12-20 | End: 2021-12-20 | Stop reason: SDUPTHER

## 2021-12-20 RX ORDER — SODIUM CHLORIDE 9 MG/ML
INJECTION, SOLUTION INTRAVENOUS CONTINUOUS
Status: DISCONTINUED | OUTPATIENT
Start: 2021-12-20 | End: 2021-12-20 | Stop reason: HOSPADM

## 2021-12-20 RX ORDER — DIPHENHYDRAMINE HYDROCHLORIDE 50 MG/ML
12.5 INJECTION INTRAMUSCULAR; INTRAVENOUS
Status: DISCONTINUED | OUTPATIENT
Start: 2021-12-20 | End: 2021-12-20 | Stop reason: HOSPADM

## 2021-12-20 RX ORDER — CIPROFLOXACIN 500 MG/1
500 TABLET, FILM COATED ORAL 2 TIMES DAILY
Qty: 6 TABLET | Refills: 0 | Status: SHIPPED | OUTPATIENT
Start: 2021-12-20 | End: 2021-12-23

## 2021-12-20 RX ORDER — METOCLOPRAMIDE HYDROCHLORIDE 5 MG/ML
10 INJECTION INTRAMUSCULAR; INTRAVENOUS
Status: DISCONTINUED | OUTPATIENT
Start: 2021-12-20 | End: 2021-12-20 | Stop reason: HOSPADM

## 2021-12-20 RX ORDER — GLYCOPYRROLATE 1 MG/5 ML
SYRINGE (ML) INTRAVENOUS PRN
Status: DISCONTINUED | OUTPATIENT
Start: 2021-12-20 | End: 2021-12-20 | Stop reason: SDUPTHER

## 2021-12-20 RX ORDER — LIDOCAINE HYDROCHLORIDE 20 MG/ML
INJECTION, SOLUTION INTRAVENOUS PRN
Status: DISCONTINUED | OUTPATIENT
Start: 2021-12-20 | End: 2021-12-20 | Stop reason: SDUPTHER

## 2021-12-20 RX ORDER — DEXAMETHASONE SODIUM PHOSPHATE 10 MG/ML
INJECTION, EMULSION INTRAMUSCULAR; INTRAVENOUS PRN
Status: DISCONTINUED | OUTPATIENT
Start: 2021-12-20 | End: 2021-12-20 | Stop reason: SDUPTHER

## 2021-12-20 RX ORDER — ONDANSETRON 2 MG/ML
INJECTION INTRAMUSCULAR; INTRAVENOUS PRN
Status: DISCONTINUED | OUTPATIENT
Start: 2021-12-20 | End: 2021-12-20 | Stop reason: SDUPTHER

## 2021-12-20 RX ORDER — KETOROLAC TROMETHAMINE 10 MG/1
10 TABLET, FILM COATED ORAL EVERY 6 HOURS PRN
Qty: 15 TABLET | Refills: 0 | Status: ON HOLD | OUTPATIENT
Start: 2021-12-20 | End: 2022-01-31

## 2021-12-20 RX ORDER — FENTANYL CITRATE 50 UG/ML
INJECTION, SOLUTION INTRAMUSCULAR; INTRAVENOUS PRN
Status: DISCONTINUED | OUTPATIENT
Start: 2021-12-20 | End: 2021-12-20 | Stop reason: SDUPTHER

## 2021-12-20 RX ORDER — FENTANYL CITRATE 50 UG/ML
50 INJECTION, SOLUTION INTRAMUSCULAR; INTRAVENOUS EVERY 5 MIN PRN
Status: DISCONTINUED | OUTPATIENT
Start: 2021-12-20 | End: 2021-12-20 | Stop reason: HOSPADM

## 2021-12-20 RX ORDER — PROPOFOL 10 MG/ML
INJECTION, EMULSION INTRAVENOUS PRN
Status: DISCONTINUED | OUTPATIENT
Start: 2021-12-20 | End: 2021-12-20 | Stop reason: SDUPTHER

## 2021-12-20 RX ORDER — PROMETHAZINE HYDROCHLORIDE 25 MG/ML
12.5 INJECTION, SOLUTION INTRAMUSCULAR; INTRAVENOUS
Status: DISCONTINUED | OUTPATIENT
Start: 2021-12-20 | End: 2021-12-20 | Stop reason: HOSPADM

## 2021-12-20 RX ORDER — LABETALOL 20 MG/4 ML (5 MG/ML) INTRAVENOUS SYRINGE
5 EVERY 10 MIN PRN
Status: DISCONTINUED | OUTPATIENT
Start: 2021-12-20 | End: 2021-12-20 | Stop reason: HOSPADM

## 2021-12-20 RX ADMIN — FENTANYL CITRATE 50 MCG: 50 INJECTION, SOLUTION INTRAMUSCULAR; INTRAVENOUS at 14:13

## 2021-12-20 RX ADMIN — CEFAZOLIN SODIUM 2000 MG: 10 INJECTION, POWDER, FOR SOLUTION INTRAVENOUS at 14:10

## 2021-12-20 RX ADMIN — Medication 0.3 MG: at 14:37

## 2021-12-20 RX ADMIN — FENTANYL CITRATE 25 MCG: 50 INJECTION, SOLUTION INTRAMUSCULAR; INTRAVENOUS at 14:34

## 2021-12-20 RX ADMIN — PROPOFOL 175 MG: 10 INJECTION, EMULSION INTRAVENOUS at 14:10

## 2021-12-20 RX ADMIN — DEXAMETHASONE SODIUM PHOSPHATE 10 MG: 10 INJECTION, EMULSION INTRAMUSCULAR; INTRAVENOUS at 14:10

## 2021-12-20 RX ADMIN — FENTANYL CITRATE 25 MCG: 50 INJECTION, SOLUTION INTRAMUSCULAR; INTRAVENOUS at 14:23

## 2021-12-20 RX ADMIN — SODIUM CHLORIDE, POTASSIUM CHLORIDE, SODIUM LACTATE AND CALCIUM CHLORIDE: 600; 310; 30; 20 INJECTION, SOLUTION INTRAVENOUS at 15:07

## 2021-12-20 RX ADMIN — ONDANSETRON 4 MG: 2 INJECTION INTRAMUSCULAR; INTRAVENOUS at 14:10

## 2021-12-20 RX ADMIN — LIDOCAINE HYDROCHLORIDE 100 MG: 20 INJECTION INTRAVENOUS at 14:10

## 2021-12-20 RX ADMIN — SODIUM CHLORIDE: 9 INJECTION, SOLUTION INTRAVENOUS at 13:42

## 2021-12-20 ASSESSMENT — PULMONARY FUNCTION TESTS
PIF_VALUE: 11
PIF_VALUE: 11
PIF_VALUE: 7
PIF_VALUE: 11
PIF_VALUE: 11
PIF_VALUE: 0
PIF_VALUE: 11
PIF_VALUE: 8
PIF_VALUE: 11
PIF_VALUE: 7
PIF_VALUE: 11
PIF_VALUE: 12
PIF_VALUE: 11
PIF_VALUE: 11
PIF_VALUE: 1
PIF_VALUE: 10
PIF_VALUE: 8
PIF_VALUE: 8
PIF_VALUE: 2
PIF_VALUE: 11
PIF_VALUE: 11
PIF_VALUE: 5
PIF_VALUE: 0
PIF_VALUE: 11
PIF_VALUE: 9
PIF_VALUE: 11
PIF_VALUE: 7
PIF_VALUE: 11
PIF_VALUE: 5
PIF_VALUE: 12
PIF_VALUE: 26
PIF_VALUE: 7
PIF_VALUE: 12
PIF_VALUE: 2
PIF_VALUE: 12
PIF_VALUE: 8
PIF_VALUE: 7
PIF_VALUE: 11
PIF_VALUE: 11
PIF_VALUE: 0
PIF_VALUE: 8
PIF_VALUE: 11
PIF_VALUE: 7
PIF_VALUE: 11
PIF_VALUE: 5
PIF_VALUE: 7
PIF_VALUE: 11
PIF_VALUE: 11
PIF_VALUE: 8
PIF_VALUE: 8
PIF_VALUE: 11
PIF_VALUE: 12
PIF_VALUE: 11
PIF_VALUE: 7
PIF_VALUE: 11
PIF_VALUE: 3
PIF_VALUE: 11
PIF_VALUE: 8
PIF_VALUE: 11
PIF_VALUE: 5
PIF_VALUE: 11
PIF_VALUE: 12
PIF_VALUE: 11
PIF_VALUE: 2
PIF_VALUE: 11
PIF_VALUE: 8
PIF_VALUE: 11
PIF_VALUE: 11

## 2021-12-20 ASSESSMENT — PAIN SCALES - GENERAL
PAINLEVEL_OUTOF10: 0
PAINLEVEL_OUTOF10: 0

## 2021-12-20 NOTE — ANESTHESIA PRE PROCEDURE
Department of Anesthesiology  Preprocedure Note       Name:  Samantha Wu   Age:  66 y.o.  :  1950                                          MRN:  [id number]         Date:  2021      Surgeon: Uziel Jennings):  Michael Anthony MD    Procedure: Procedure(s):  CYSTO, RIGHT RETROGRADE PYELOGRAM, POSS URETERAL DILATION, POSS URETERAL BX POSS RIGHT URETERAL STENT PLACEMENT    Medications prior to admission:   Prior to Admission medications    Medication Sig Start Date End Date Taking?  Authorizing Provider   tamsulosin (FLOMAX) 0.4 MG capsule Take 1 capsule by mouth daily for 10 days 21 Yes Dick Pradhan PA-C   ketorolac (TORADOL) 10 MG tablet Take 1 tablet by mouth 3 times daily as needed for Pain 21 Yes Dick Pradhan PA-C   ondansetron (ZOFRAN) 4 MG tablet Take 1 tablet by mouth every 8 hours as needed for Nausea 21 Yes Dick Pradhan PA-C   losartan (COZAAR) 100 MG tablet Take 1 tablet by mouth daily 10/30/21  Yes Devorah Portillo MD   metoprolol tartrate (LOPRESSOR) 25 MG tablet TAKE 1 TABLET TWICE A DAY 5/10/21  Yes Devorah Portillo MD   vitamin D (ERGOCALCIFEROL) 1.25 MG (78434 UT) CAPS capsule TAKE 1 CAPSULE ONCE A WEEK 3/19/21  Yes RASHAD Miller CNP   atorvastatin (LIPITOR) 40 MG tablet TAKE 1 TABLET DAILY 3/9/21  Yes RASHAD Miller CNP   Ascorbic Acid (VITAMIN C) 250 MG tablet Take 250 mg by mouth daily   Yes Historical Provider, MD   aspirin 81 MG EC tablet Take 81 mg by mouth 2 times daily    Yes Historical Provider, MD   acetaminophen (TYLENOL ARTHRITIS PAIN) 650 MG extended release tablet Take 650 mg by mouth every 8 hours as needed for Pain   Yes Historical Provider, MD   sodium chloride (OCEAN, BABY AYR) 0.65 % nasal spray 1 spray by Nasal route as needed for Congestion   Yes Historical Provider, MD   Multiple Vitamin (MULTI-VITAMIN) TABS Take by mouth daily    Yes Historical Provider, MD       Current medications:    Current Facility-Administered Medications   Medication Dose Route Frequency Provider Last Rate Last Admin    0.9 % sodium chloride infusion   IntraVENous Continuous Lisa Clemente  mL/hr at 12/20/21 1342 New Bag at 12/20/21 1342    ceFAZolin (ANCEF) 2000 mg in dextrose 5 % 50 mL IVPB  2,000 mg IntraVENous 30 Min Pre-Op Lisa Clemente MD           Allergies:  No Known Allergies    Problem List:    Patient Active Problem List   Diagnosis Code    CAD (coronary artery disease) I25.10    Hypertension I10    Hyperlipidemia E78.5    S/P CABG x 6,2007 Z95.1    CAD (coronary artery disease) I25.10    Colon polyps K63.5       Past Medical History:        Diagnosis Date    Arthritis     ASHD (arteriosclerotic heart disease)     CAD (coronary artery disease)     Status post bypass    Colon polyps 2009      colonoscopy  tubular  adenoma   rinesmith    Decreased sense of smell 5/22/2015    Epistaxis 5/22/2015    History of blood transfusion 2005    ? ???? with by pass? ??    Hyperlipidemia     Hypertension     Mild carotid artery disease ([facility] [address] 2013    Nasal obstruction 5/22/2015    Nasal septal perforation 5/22/2015       Past Surgical History:        Procedure Laterality Date    CARDIAC SURGERY  2005    6 by pass    COLONOSCOPY  12/2019    Dr Gisele Norris  colon polyps 8//2014 9-2019    COLONOSCOPY Left 09/13/2019    COLONOSCOPY POLYPECTOMY SNARE/COLD BIOPSY performed by Mayuri Garcia MD at [address]  11/2005    EXCISION OF AURAL MASS      NASAL POLYP SURGERY  06/05/2015    BIOPSY OF NASAL POLYP MIDDLE TURBINATE AND BIOPSY SEPTAL PERFORTATION    SKIN CANCER EXCISION  2018    on back    TONSILLECTOMY      as a child       Social History:    Social History     Tobacco Use    Smoking status: Former Smoker     Packs/day: 1.50     Years: 30.00     Pack years: 45.00     Types: Cigarettes     Quit date: 6/3/2002     Years since quittin.5    Smokeless tobacco: Never Used   Substance Use Topics    Alcohol use: Yes     Comment: rarely                                Counseling given: Not Answered      Vital Signs (Current):   Vitals:    21 1249   BP: (!) 178/86   Pulse: 72   Resp: 18   Temp: 97.3 °F (36.3 °C)   TempSrc: Temporal   SpO2: 95%                                              BP Readings from Last 3 Encounters:   21 (!) 178/86   21 (!) 168/82   21 136/76       NPO Status: Time of last liquid consumption:  (sip of water with medication at 0730)                        Time of last solid consumption:                         Date of last liquid consumption: 21                        Date of last solid food consumption: 21    BMI:   Wt Readings from Last 3 Encounters:   21 240 lb (108.9 kg)   10/18/21 239 lb 4 oz (108.5 kg)   21 236 lb (107 kg)     There is no height or weight on file to calculate BMI.    CBC:   Lab Results   Component Value Date    WBC 15.7 2021    RBC 4.51 2021    RBC 4.44 2020    RBC 3 2020    HGB 14.8 2021    HCT 44.4 2021    MCV 98.4 2021    RDW 13.8 2020     2021       CMP:   Lab Results   Component Value Date     2021    K 4.9 2021     2021    CO2 22 2021    BUN 22 2021    CREATININE 1.3 2021    LABGLOM 54 2021    GLUCOSE 134 2021    GLUCOSE 97 2020    PROT 7.5 2020    CALCIUM 9.5 2021    BILITOT 0.4 2020    BILITOT Negative 2020    ALKPHOS 116 2020    ALKPHOS 147 07/10/2019    AST 25 2020    ALT 26 2020       POC Tests: No results for input(s): POCGLU, POCNA, POCK, POCCL, POCBUN, POCHEMO, POCHCT in the last 72 hours.     Coags:   Lab Results   Component Value Date    INR 1.09 2015    APTT 25.7 2015       HCG (If Applicable): No results found for: PREGTESTUR, PREGSERUM, HCG, HCGQUANT     ABGs: No results found for: PHART, PO2ART, JSZ8LZD, JSC6LYO, BEART, N4TQBLSZ     Type & Screen (If Applicable):  No results found for: LABABO, LABRH    Drug/Infectious Status (If Applicable):  No results found for: HIV, HEPCAB    COVID-19 Screening (If Applicable): No results found for: COVID19        Anesthesia Evaluation  Patient summary reviewed no history of anesthetic complications:   Airway: Mallampati: II  TM distance: >3 FB   Neck ROM: full  Mouth opening: > = 3 FB Dental: normal exam         Pulmonary:normal exam                              ROS comment: Former smoker   Cardiovascular:    (+) hypertension:, CAD:, CABG/stent:, hyperlipidemia                  Neuro/Psych:               GI/Hepatic/Renal:             Endo/Other:                     Abdominal:             Vascular: Other Findings:             Anesthesia Plan      general     ASA 3       Induction: intravenous. MIPS: Postoperative opioids intended and Prophylactic antiemetics administered. Anesthetic plan and risks discussed with patient and spouse.       Plan discussed with CRNA and surgical team.                  Lucy Desir MD   12/20/2021

## 2021-12-20 NOTE — PROGRESS NOTES
1519 Awake and oriented on arrival to PACU , [address] elevated ,pt denies any pain\  1530 pt given a urinal , pt able to void  1555 pt remains pain free , denies any needs  Pt meets criteria for discharge , transported to [facility]

## 2021-12-20 NOTE — PROGRESS NOTES
Pt admitted to [facility] room 10 and oriented to unit. SCD sleeves applied. Nares swabbed. Pt verbalized permission for first name, last initial and physicians name on white board. SDS board and discharge criteria explained, pt and family verbalized understanding. Pt denies thoughts of harming self or others. Call light in reach. Family at the bedside.

## 2021-12-20 NOTE — OP NOTE
FAC[facility]:  [address] [place] [address] [facility], [address] [place] [address]   [birth year]  [id number]    DATE: 12/20/21  SURGEON:  Dr. lAie Smyth MD , MD  ASSISTANT: Dr. Alie Smyth MD MD  PREOPERATIVE DIAGNOSIS: Right hydronephrosis  POSTOPERATIVE DIAGNOSIS: Right ureteral stricture with hydroneprhosis  PROCEDURES PERFORMED:  1. Cystoscopy right retrograde pyelogram  2. Right sided ureteroscopy ureteral biopsy and fulguration  3. Right ureteral stricture dilation (nephromax)  4. Righ sided stent placement. DRAINS: A Right sided 6x26 cm double J ureteral stent   SPECIMEN: none  ANESTHESIA: General  ESTIMATED BLOOD LOSS: None. COMPLICATIONS: None. Findings:  Prostate: large and obstructing (80 gm), with large median lobe (appropriate for greenlight PVP). UOs are close to bladder neck. Right ureteral stricture: 1 cm, distal right ureter. INDICATIONS FOR PROCEDURE:  Ewa Garrett is a 66 y.o. male presents for Right flank pain with hydronephrosis. After the risks, benefits, alternatives, of the procedure were discussed with the patient, informed consent was obtained. The patient elected to proceed. DETAILS OF THE PROCEDURE:  The patient was brought back from the preoperative holding area to the operating suite, and was transferred to the operating table where the patient lay in supine position. EPC's were in place, connected to the machine and the machine was turned on before induction. General endotracheal anesthesia was induced, and patient was prepped and draped in standard surgical fashion after being placed in dorsolithotomy position. A proper timeout was performed, preoperative antibiotics were given. We began by inserting a cystoscope with a 22 [place] sheath and 30 degree lens into the patient's urethral meatus and advancing into the bladder without complication. A pan cystoscopy was preformed and the bladder appeared unremarkable.     Prostate: large and obstructing (80 gm), with large median lobe (appropriate for greenlight PVP). UOs are close to bladder neck. We then focused our attention on the Right ureteral orifice, which we cannulated with our glidewire, advanced up to renal pelvis. We then used a dual lumen, to perform a retrograde pyelogram to identify the level of obstruction and renal pelvis. Findings:  Right ureteral stricture: 1-2 cm, distal right ureter. We then used the catheter to place a second wire. Once in good position, we advanced our rigid ureteroscope over the working wire to the distal ureter under direct visualization. We identified a ureteral stricture which was approximately 1-2 cm in the distal ureter. It wasn't obviously papillary tumor, but it did appear unusual in appearance. We were able to take 2 biopsies with the AriadNEXT biopsy forcept and then fulgurate with a bugbee electrode. At this time a proximal ureteroscopy was preformed and no other strictures were identified. We then used a UroMax ureteral dilator (15 fr x 10 cm) to dilate the ureteral stricture using fluoroscopy. We withdrew the ureteroscope and visualized the ureter. No stone fragments were identified. No damage to the ureter was identified. At this time, over the remaining glidewire, we placed a 6x26 cm double J ureteral stent in the usual fashion, and we noted appropriate placement in the upper collecting system using fluoroscopy. There was a good curl noted in the bladder. The patient's bladder was drained and removed the scope and the procedure was subsequently terminated.     Plan:  Follow up 1-2 weeks for pathology results  Will need to schedule cysto rgpg and stent removal vs exchange in 6 weeks

## 2021-12-20 NOTE — ANESTHESIA POSTPROCEDURE EVALUATION
Department of Anesthesiology  Postprocedure Note    Patient: Javy Martins  MRN: [id number]  YOB: 1950  Date of evaluation: 12/20/2021  Time:  4:11 PM     Procedure Summary     Date: 12/20/21 Room / Location: [facility] / [place] [facility]    Anesthesia Start: 1406 Anesthesia Stop: 0568    Procedure: CYSTO, URETEROSCOPY, RIGHT RETROGRADE PYELOGRAM, URETERAL DILATION, WITH URETERAL BX RIGHT URETERAL STENT PLACEMENT (Right ) Diagnosis: (HYDRONEPHROSIS)    Surgeons: Elisabeth Espinosa MD Responsible Provider: Cooper Spears MD    Anesthesia Type: general ASA Status: 3          Anesthesia Type: general    Moira Phase I: Moira Score: 9    Moira Phase II: Moira Score: 10    Last vitals: Reviewed and per EMR flowsheets. Anesthesia Post Evaluation    Patient location during evaluation: PACU  Patient participation: complete - patient participated  Level of consciousness: awake and alert  Airway patency: patent  Nausea & Vomiting: no nausea and no vomiting  Complications: no  Cardiovascular status: hemodynamically stable  Respiratory status: acceptable  Hydration status: euvolemic      [address]  POST-ANESTHESIA NOTE       Name:  Javy Martins                                         Age:  66 y.o.   MRN:  [id number]      Last Vitals:  BP (!) 176/81   Pulse 74   Temp 96.8 °F (36 °C)   Resp 18   SpO2 95%   Patient Vitals for the past 4 hrs:   BP Temp Temp src Pulse Resp SpO2   12/20/21 1600 (!) 176/81 96.8 °F (36 °C)  74 18 95 %   12/20/21 1555 (!) 145/65   65 21 96 %   12/20/21 1550 (!) 142/66   76 15 96 %   12/20/21 1545 (!) 148/68   70 27 96 %   12/20/21 1540 (!) 150/70   68 13 96 %   12/20/21 1535 (!) 148/72   78 15 96 %   12/20/21 1530 (!) 150/73   82 23 95 %   12/20/21 1525 (!) 155/74   82 17 95 %   12/20/21 1520 (!) 156/72   81 19 94 %   12/20/21 1519 (!) 157/72 97.3 °F (36.3 °C)  83 18 92 %   12/20/21 1249 (!) 178/86 97.3 °F (36.3 °C) Temporal 72 18 95 %       Level of Consciousness:  Awake    Respiratory:  Stable    Oxygen Saturation:  Stable    Cardiovascular:  Stable    Hydration:  Adequate    PONV:  Stable    Post-op Pain:  Adequate analgesia    Post-op Assessment:  No apparent anesthetic complications    Additional Follow-Up / Treatment / Comment:  None    Reny Caceres MD  December 20, 2021   4:11 PM

## 2021-12-21 ENCOUNTER — TELEPHONE (OUTPATIENT)
Dept: UROLOGY | Age: 71
End: 2021-12-21

## 2021-12-21 DIAGNOSIS — Z01.818 PRE-OP TESTING: Primary | ICD-10-CM

## 2021-12-21 DIAGNOSIS — N13.5 URETER, STRICTURE: ICD-10-CM

## 2021-12-21 NOTE — H&P
[facility]  Urology H&P Note     Patient:  Pat Galindo  MRN: [id number]  YOB: 1950    ATTENDING: Pavel Leos MD     CHIEF COMPLAINT:  Right hydronephrosis    HISTORY OF PRESENT ILLNESS:   The patient is a 66 y.o. male who presents with right hydronephrosis    Patient's old records, notes and chart reviewed and summarized above. Past Medical History:    Past Medical History:   Diagnosis Date    Arthritis     ASHD (arteriosclerotic heart disease)     CAD (coronary artery disease)     Status post bypass    Colon polyps 2009      colonoscopy  tubular  adenoma   rinesmith    Decreased sense of smell 5/22/2015    Epistaxis 5/22/2015    History of blood transfusion 2005    ? ???? with by pass? ??    Hyperlipidemia     Hypertension     Mild carotid artery disease ([facility] [address] 2013    Nasal obstruction 5/22/2015    Nasal septal perforation 5/22/2015       Past Surgical History:    Past Surgical History:   Procedure Laterality Date    CARDIAC SURGERY  2005    6 by pass    COLONOSCOPY  12/2019    Dr Ailin Griffin  colon polyps 8//2014 9-2019    COLONOSCOPY Left 09/13/2019    COLONOSCOPY POLYPECTOMY SNARE/COLD BIOPSY performed by Bob Carter MD at [address]  11/2005    EXCISION OF AURAL MASS      NASAL POLYP SURGERY  06/05/2015    BIOPSY OF NASAL POLYP MIDDLE TURBINATE AND BIOPSY SEPTAL [address] SKIN CANCER EXCISION  2018    on back    TONSILLECTOMY      as a child       Medications Prior to Admission:   Prior to Admission medications    Medication Sig Start Date End Date Taking?  Authorizing Provider   ketorolac (TORADOL) 10 MG tablet Take 1 tablet by mouth every 6 hours as needed for Pain 12/20/21  Yes Jay Sam MD   ciprofloxacin (CIPRO) 500 MG tablet Take 1 tablet by mouth 2 times daily for 3 days 12/20/21 12/23/21 Yes Jay Sam MD   tamsulosin (FLOMAX) 0.4 MG capsule Take 1 capsule by mouth daily for 10 days 21 Yes Dick Pradhan PA-C   ketorolac (TORADOL) 10 MG tablet Take 1 tablet by mouth 3 times daily as needed for Pain 21 Yes Dick Pradhan PA-C   ondansetron (ZOFRAN) 4 MG tablet Take 1 tablet by mouth every 8 hours as needed for Nausea 21 Yes Dick Pradhan PA-C   losartan (COZAAR) 100 MG tablet Take 1 tablet by mouth daily 10/30/21  Yes Devorah Portillo MD   metoprolol tartrate (LOPRESSOR) 25 MG tablet TAKE 1 TABLET TWICE A DAY 5/10/21  Yes Devorah Portillo MD   vitamin D (ERGOCALCIFEROL) 1.25 MG (31237 UT) CAPS capsule TAKE 1 CAPSULE ONCE A WEEK 3/19/21  Yes RASHAD Miller CNP   atorvastatin (LIPITOR) 40 MG tablet TAKE 1 TABLET DAILY 3/9/21  Yes RASHAD Miller CNP   Ascorbic Acid (VITAMIN C) 250 MG tablet Take 250 mg by mouth daily   Yes Historical Provider, MD   aspirin 81 MG EC tablet Take 81 mg by mouth 2 times daily    Yes Historical Provider, MD   acetaminophen (TYLENOL ARTHRITIS PAIN) 650 MG extended release tablet Take 650 mg by mouth every 8 hours as needed for Pain   Yes Historical Provider, MD   sodium chloride (OCEAN, BABY AYR) 0.65 % nasal spray 1 spray by Nasal route as needed for Congestion   Yes Historical Provider, MD   Multiple Vitamin (MULTI-VITAMIN) TABS Take by mouth daily    Yes Historical Provider, MD       Allergies:  Patient has no known allergies.     Social History:    Social History     Socioeconomic History    Marital status:      Spouse name: Not on file    Number of children: Not on file    Years of education: Not on file    Highest education level: Not on file   Occupational History    Not on file   Tobacco Use    Smoking status: Former Smoker     Packs/day: 1.50     Years: 30.00     Pack years: 45.00     Types: Cigarettes     Quit date: 6/3/2002     Years since quittin.5    Smokeless tobacco: Never Used   Substance and Sexual Activity    Alcohol use: Yes     Comment: rarely    Drug use: No    Sexual activity: Not Currently     Partners: Female   Other Topics Concern    Not on file   Social History Narrative    Not on file     Social Determinants of Health     Financial Resource Strain: Low Risk     Difficulty of Paying Living Expenses: Not hard at all   Food Insecurity: No Food Insecurity    Worried About Running Out of Food in the Last Year: Never true    [address] in the Last Year: Never true   Transportation Needs:     Lack of Transportation (Medical): Not on file    Lack of Transportation (Non-Medical): Not on file   Physical Activity:     Days of Exercise per Week: Not on file    Minutes of Exercise per Session: Not on file   Stress:     Feeling of Stress : Not on file   Social Connections:     Frequency of Communication with Friends and Family: Not on file    Frequency of Social Gatherings with Friends and Family: Not on file    Attends Christian Services: Not on file    Active Member of [address] [place] [address] or Organizations: Not on file    Attends Club or Organization Meetings: Not on file    Marital Status: Not on file   Intimate Partner Violence:     Fear of Current or Ex-Partner: Not on file    Emotionally Abused: Not on file    Physically Abused: Not on file    Sexually Abused: Not on file   Housing Stability:     Unable to Pay for Housing in the Last Year: Not on file    Number of [place] in the Last Year: Not on file    Unstable Housing in the Last Year: Not on file       Family History:    Family History   Problem Relation Age of Onset    Heart Disease Mother     Cancer Father         throat    Diabetes Sister     Asthma Brother        REVIEW OF SYSTEMS:  All systems reviewed and negative except for that already noted in the HPI.     Physical Exam:      Patient Vitals for the past 24 hrs:   BP Temp Temp src Pulse Resp SpO2   12/20/21 1643 (!) 182/83   59 16 97 %   12/20/21 1600 (!) 176/81 96.8 °F (36 °C)  74 18 95 %   12/20/21 1555 (!) 145/65   65 21 96 %   12/20/21 1550 (!) 142/66   76 15 96 %   12/20/21 1545 (!) 148/68   70 27 96 %   12/20/21 1540 (!) 150/70   68 13 96 %   12/20/21 1535 (!) 148/72   78 15 96 %   12/20/21 1530 (!) 150/73   82 23 95 %   12/20/21 1525 (!) 155/74   82 17 95 %   12/20/21 1520 (!) 156/72   81 19 94 %   12/20/21 1519 (!) 157/72 97.3 °F (36.3 °C)  83 18 92 %   12/20/21 1249 (!) 178/86 97.3 °F (36.3 °C) Temporal 72 18 95 %     Constitutional: Patient in no acute distress; Neuro: alert and oriented to person place and time. Psych: Mood and affect normal.  Skin: Normal  Lungs: Respiratory effort normal  Cardiovascular:  Normal peripheral pulses  Abdomen: Soft, non-tender, non-distended with no CVA, flank pain, hepatosplenomegaly or hernia. Kidneys normal.  Bladder non-tender and not distended.   Lymphatics: no palpable lymphadenopathy        Assessment and Plan   Impression:    Patient Active Problem List   Diagnosis    CAD (coronary artery disease)    Hypertension    Hyperlipidemia    S/P CABG x 6,2007    CAD (coronary artery disease)    Colon polyps       Plan:   Cysto right rgpg possible Ureteroscopy

## 2021-12-21 NOTE — TELEPHONE ENCOUNTER
SURGERY [address] [place] [address] [address] [facility] [address] [place], [address]      Phone *[phone] *[phone]   Surgical Scheduling Direct Line Phone *[phone] Fax *[phone]      Zac Moreno 1950 male    Annabel Lubin 54 [address]   Marital Status:          Home Phone: [phone]      Cell Phone:    Telephone Information:   Mobile [phone]          Surgeon: Dr. Aria Bush Surgery Date: 1/31/22   Time: 11:30 am    Procedure: Cystoscopy, Right Retrograde Pyelogram, Right Stent Exchange vs Removal    Diagnosis: Urethral Stricture     Important Medical History:  In Epic    Special Inst/Equip:     CPT Codes:    12646,76645  Latex Allergy: No     Cardiac Device:  No    Anesthesia:  MAC          Admission Type:  Same Day                        Admit Prior to Day of Surgery: No    Case Location:  Main OR            Preadmission Testing:  Phone Call          PAT Date and Time:______________________________________________________    PAT Confirmation #: ______________________________________________________    Post Op Visit: ___________________________________________________________    Need Preop Cardiac Clearance: No    Does Patient have Cardiologist/physician?      None    Surgery Confirmation #: __________________________________________________    : ________________________   Date: __________________________     [employer] Name: Madge Severe

## 2021-12-21 NOTE — TELEPHONE ENCOUNTER
Patient scheduled for surgery with Dr Shane Gaviria on 1/31/22. Surgery consent on arrival. Patient to do pre op urine culture on 1/17/22. Surgery instructions to be given to the patient on his 12/27/21 appointment.

## 2021-12-24 ENCOUNTER — HOSPITAL ENCOUNTER (EMERGENCY)
Age: 71
Discharge: HOME | End: 2021-12-24
Attending: FAMILY MEDICINE
Payer: MEDICARE

## 2021-12-24 ENCOUNTER — APPOINTMENT (OUTPATIENT)
Dept: GENERAL RADIOLOGY | Age: 71
End: 2021-12-24
Payer: MEDICARE

## 2021-12-24 ENCOUNTER — APPOINTMENT (OUTPATIENT)
Dept: CT IMAGING | Age: 71
End: 2021-12-24
Payer: MEDICARE

## 2021-12-24 VITALS
SYSTOLIC BLOOD PRESSURE: 167 MMHG | TEMPERATURE: 98 F | HEART RATE: 63 BPM | RESPIRATION RATE: 14 BRPM | HEIGHT: 71 IN | DIASTOLIC BLOOD PRESSURE: 78 MMHG | BODY MASS INDEX: 32.9 KG/M2 | WEIGHT: 235 LBS | OXYGEN SATURATION: 97 %

## 2021-12-24 DIAGNOSIS — N45.2 ORCHITIS: Primary | ICD-10-CM

## 2021-12-24 DIAGNOSIS — B35.6 JOCK ITCH: ICD-10-CM

## 2021-12-24 LAB
ALBUMIN SERPL-MCNC: 3.7 G/DL (ref 3.5–5.1)
ALP BLD-CCNC: 123 U/L (ref 38–126)
ALT SERPL-CCNC: 21 U/L (ref 11–66)
ANION GAP SERPL CALCULATED.3IONS-SCNC: 10 MEQ/L (ref 8–16)
AST SERPL-CCNC: 21 U/L (ref 5–40)
BACTERIA: ABNORMAL /HPF
BASOPHILS # BLD: 0.3 %
BASOPHILS ABSOLUTE: 0 THOU/MM3 (ref 0–0.1)
BILIRUB SERPL-MCNC: 0.2 MG/DL (ref 0.3–1.2)
BILIRUBIN URINE: NEGATIVE
BLOOD, URINE: ABNORMAL
BUN BLDV-MCNC: 18 MG/DL (ref 7–22)
CALCIUM SERPL-MCNC: 9.1 MG/DL (ref 8.5–10.5)
CASTS 2: ABNORMAL /LPF
CASTS UA: ABNORMAL /LPF
CHARACTER, URINE: CLEAR
CHLORIDE BLD-SCNC: 105 MEQ/L (ref 98–111)
CO2: 24 MEQ/L (ref 23–33)
COLOR: YELLOW
CREAT SERPL-MCNC: 0.9 MG/DL (ref 0.4–1.2)
CRYSTALS, UA: ABNORMAL
EKG ATRIAL RATE: 63 BPM
EKG P AXIS: 36 DEGREES
EKG P-R INTERVAL: 168 MS
EKG Q-T INTERVAL: 382 MS
EKG QRS DURATION: 92 MS
EKG QTC CALCULATION (BAZETT): 390 MS
EKG R AXIS: 36 DEGREES
EKG T AXIS: 74 DEGREES
EKG VENTRICULAR RATE: 63 BPM
EOSINOPHIL # BLD: 6.7 %
EOSINOPHILS ABSOLUTE: 0.7 THOU/MM3 (ref 0–0.4)
EPITHELIAL CELLS, UA: ABNORMAL /HPF
ERYTHROCYTE [DISTWIDTH] IN BLOOD BY AUTOMATED COUNT: 13.2 % (ref 11.5–14.5)
ERYTHROCYTE [DISTWIDTH] IN BLOOD BY AUTOMATED COUNT: 48.5 FL (ref 35–45)
GFR SERPL CREATININE-BSD FRML MDRD: 83 ML/MIN/1.73M2
GLUCOSE BLD-MCNC: 101 MG/DL (ref 70–108)
GLUCOSE URINE: NEGATIVE MG/DL
HCT VFR BLD CALC: 41.9 % (ref 42–52)
HEMOGLOBIN: 13.4 GM/DL (ref 14–18)
IMMATURE GRANS (ABS): 0.06 THOU/MM3 (ref 0–0.07)
IMMATURE GRANULOCYTES: 0.6 %
KETONES, URINE: NEGATIVE
LACTIC ACID, SEPSIS: 1.3 MMOL/L (ref 0.5–1.9)
LACTIC ACID, SEPSIS: 2.2 MMOL/L (ref 0.5–1.9)
LEUKOCYTE ESTERASE, URINE: NEGATIVE
LYMPHOCYTES # BLD: 21.3 %
LYMPHOCYTES ABSOLUTE: 2.2 THOU/MM3 (ref 1–4.8)
MCH RBC QN AUTO: 32 PG (ref 26–33)
MCHC RBC AUTO-ENTMCNC: 32 GM/DL (ref 32.2–35.5)
MCV RBC AUTO: 100 FL (ref 80–94)
MISCELLANEOUS 2: ABNORMAL
MONOCYTES # BLD: 10.1 %
MONOCYTES ABSOLUTE: 1 THOU/MM3 (ref 0.4–1.3)
NITRITE, URINE: NEGATIVE
NUCLEATED RED BLOOD CELLS: 0 /100 WBC
OSMOLALITY CALCULATION: 279.6 MOSMOL/KG (ref 275–300)
PH UA: 7 (ref 5–9)
PLATELET # BLD: 209 THOU/MM3 (ref 130–400)
PMV BLD AUTO: 10.2 FL (ref 9.4–12.4)
POTASSIUM REFLEX MAGNESIUM: 4.8 MEQ/L (ref 3.5–5.2)
PRO-BNP: 214.6 PG/ML (ref 0–900)
PROTEIN UA: NEGATIVE
RBC # BLD: 4.19 MILL/MM3 (ref 4.7–6.1)
RBC URINE: ABNORMAL /HPF
RENAL EPITHELIAL, UA: ABNORMAL
SEG NEUTROPHILS: 61 %
SEGMENTED NEUTROPHILS ABSOLUTE COUNT: 6.2 THOU/MM3 (ref 1.8–7.7)
SODIUM BLD-SCNC: 139 MEQ/L (ref 135–145)
SPECIFIC GRAVITY, URINE: 1.01 (ref 1–1.03)
TOTAL PROTEIN: 7.1 G/DL (ref 6.1–8)
TROPONIN T: < 0.01 NG/ML
UROBILINOGEN, URINE: 0.2 EU/DL (ref 0–1)
WBC # BLD: 10.2 THOU/MM3 (ref 4.8–10.8)
WBC UA: ABNORMAL /HPF
YEAST: ABNORMAL

## 2021-12-24 PROCEDURE — 80053 COMPREHEN METABOLIC PANEL: CPT

## 2021-12-24 PROCEDURE — 36415 COLL VENOUS BLD VENIPUNCTURE: CPT

## 2021-12-24 PROCEDURE — 84484 ASSAY OF TROPONIN QUANT: CPT

## 2021-12-24 PROCEDURE — 93010 ELECTROCARDIOGRAM REPORT: CPT | Performed by: INTERNAL MEDICINE

## 2021-12-24 PROCEDURE — 2580000003 HC RX 258: Performed by: STUDENT IN AN ORGANIZED HEALTH CARE EDUCATION/TRAINING PROGRAM

## 2021-12-24 PROCEDURE — 85025 COMPLETE CBC W/AUTO DIFF WBC: CPT

## 2021-12-24 PROCEDURE — 87040 BLOOD CULTURE FOR BACTERIA: CPT

## 2021-12-24 PROCEDURE — 6360000004 HC RX CONTRAST MEDICATION: Performed by: STUDENT IN AN ORGANIZED HEALTH CARE EDUCATION/TRAINING PROGRAM

## 2021-12-24 PROCEDURE — 6370000000 HC RX 637 (ALT 250 FOR IP): Performed by: STUDENT IN AN ORGANIZED HEALTH CARE EDUCATION/TRAINING PROGRAM

## 2021-12-24 PROCEDURE — 83880 ASSAY OF NATRIURETIC PEPTIDE: CPT

## 2021-12-24 PROCEDURE — 99285 EMERGENCY DEPT VISIT HI MDM: CPT

## 2021-12-24 PROCEDURE — 71045 X-RAY EXAM CHEST 1 VIEW: CPT

## 2021-12-24 PROCEDURE — 83605 ASSAY OF LACTIC ACID: CPT

## 2021-12-24 PROCEDURE — 93005 ELECTROCARDIOGRAM TRACING: CPT | Performed by: STUDENT IN AN ORGANIZED HEALTH CARE EDUCATION/TRAINING PROGRAM

## 2021-12-24 PROCEDURE — 81001 URINALYSIS AUTO W/SCOPE: CPT

## 2021-12-24 PROCEDURE — 74177 CT ABD & PELVIS W/CONTRAST: CPT

## 2021-12-24 RX ORDER — CIPROFLOXACIN 500 MG/1
500 TABLET, FILM COATED ORAL ONCE
Status: COMPLETED | OUTPATIENT
Start: 2021-12-24 | End: 2021-12-24

## 2021-12-24 RX ORDER — CIPROFLOXACIN 500 MG/1
500 TABLET, FILM COATED ORAL DAILY
Qty: 1 TABLET | Refills: 0 | Status: SHIPPED | OUTPATIENT
Start: 2021-12-24 | End: 2021-12-24 | Stop reason: CLARIF

## 2021-12-24 RX ORDER — CIPROFLOXACIN 500 MG/1
500 TABLET, FILM COATED ORAL DAILY
Qty: 2 TABLET | Refills: 0 | Status: SHIPPED | OUTPATIENT
Start: 2021-12-26 | End: 2021-12-24 | Stop reason: CLARIF

## 2021-12-24 RX ORDER — CIPROFLOXACIN 500 MG/1
500 TABLET, FILM COATED ORAL 2 TIMES DAILY
Qty: 2 TABLET | Refills: 0 | Status: SHIPPED | OUTPATIENT
Start: 2021-12-25 | End: 2021-12-26

## 2021-12-24 RX ORDER — FLUCONAZOLE 150 MG/1
150 TABLET ORAL ONCE
Qty: 1 TABLET | Refills: 0 | Status: SHIPPED | OUTPATIENT
Start: 2021-12-24 | End: 2021-12-24 | Stop reason: CLARIF

## 2021-12-24 RX ORDER — CIPROFLOXACIN 500 MG/1
500 TABLET, FILM COATED ORAL 2 TIMES DAILY
Qty: 4 TABLET | Refills: 0 | Status: SHIPPED | OUTPATIENT
Start: 2021-12-26 | End: 2021-12-28

## 2021-12-24 RX ORDER — FLUCONAZOLE 200 MG/1
200 TABLET ORAL ONCE
Status: COMPLETED | OUTPATIENT
Start: 2021-12-24 | End: 2021-12-24

## 2021-12-24 RX ORDER — 0.9 % SODIUM CHLORIDE 0.9 %
1000 INTRAVENOUS SOLUTION INTRAVENOUS ONCE
Status: COMPLETED | OUTPATIENT
Start: 2021-12-24 | End: 2021-12-24

## 2021-12-24 RX ADMIN — FLUCONAZOLE 200 MG: 200 TABLET ORAL at 20:30

## 2021-12-24 RX ADMIN — IOPAMIDOL 80 ML: 755 INJECTION, SOLUTION INTRAVENOUS at 17:09

## 2021-12-24 RX ADMIN — SODIUM CHLORIDE 1000 ML: 9 INJECTION, SOLUTION INTRAVENOUS at 15:58

## 2021-12-24 RX ADMIN — CIPROFLOXACIN 500 MG: 500 TABLET, FILM COATED ORAL at 20:30

## 2021-12-24 ASSESSMENT — ENCOUNTER SYMPTOMS
BACK PAIN: 0
SINUS PAIN: 0
VOMITING: 0
SHORTNESS OF BREATH: 0
ABDOMINAL PAIN: 1
NAUSEA: 1
COUGH: 0
DIARRHEA: 0
EYE PAIN: 0
CONSTIPATION: 0

## 2021-12-24 NOTE — ED NOTES
Bed: 020A  Expected date:   Expected time:   Means of arrival:   Comments:  [address] RN  12/24/21 0105

## 2021-12-24 NOTE — ED TRIAGE NOTES
Patient to ED c/c post-op problem (kidney stent). Patient had a stent placed on Monday. States he noticed the swelling yesterday in his groin area. Patient denies pain, but states it is itchy. Patient was seen here 10/20/21.

## 2021-12-24 NOTE — ED PROVIDER NOTES
penile swelling. Musculoskeletal: Negative for back pain and neck pain. Skin: Negative for wound. Neurological: Negative for headaches. Psychiatric/Behavioral: Negative for confusion. PAST MEDICAL AND SURGICAL HISTORY     Past Medical History:   Diagnosis Date    Arthritis     ASHD (arteriosclerotic heart disease)     CAD (coronary artery disease)     Status post bypass    Colon polyps 2009      colonoscopy  tubular  adenoma   [facility]    Decreased sense of smell 5/22/2015    Epistaxis 5/22/2015    History of blood transfusion 2005    ? ???? with by pass? ??    Hyperlipidemia     Hypertension     Mild carotid artery disease ([facility] [address] 2013    Nasal obstruction 5/22/2015    Nasal septal perforation 5/22/2015     Past Surgical History:   Procedure Laterality Date    BLADDER SURGERY Right 12/20/2021    CYSTO, URETEROSCOPY, RIGHT RETROGRADE PYELOGRAM, URETERAL DILATION, WITH URETERAL BX RIGHT URETERAL STENT PLACEMENT performed by Chastity Darnell MD at [address]  2005    6 by pass    COLONOSCOPY  12/2019    Dr Kandice Mayen  colon polyps 8//2014 9-2019    COLONOSCOPY Left 09/13/2019    COLONOSCOPY POLYPECTOMY SNARE/COLD BIOPSY performed by Osbaldo Max MD at [address]  11/2005    EXCISION OF AURAL MASS      NASAL POLYP SURGERY  06/05/2015    BIOPSY OF NASAL POLYP MIDDLE TURBINATE AND BIOPSY SEPTAL [address] SKIN CANCER EXCISION  2018    on back    TONSILLECTOMY      as a child         MEDICATIONS   No current facility-administered medications for this encounter.     Current Outpatient Medications:     fluconazole (DIFLUCAN) 150 MG tablet, Take 1 tablet by mouth once for 1 dose, Disp: 1 tablet, Rfl: 0    ciprofloxacin (CIPRO) 500 MG tablet, Take 1 tablet by mouth daily for 1 day, Disp: 1 tablet, Rfl: 0    [START ON 12/26/2021] ciprofloxacin (CIPRO) 500 MG tablet, Take 1 tablet by mouth daily for 2 days, Disp: 2 tablet, Rfl: 0    ketorolac (TORADOL) 10 MG tablet, Take 1 tablet by mouth every 6 hours as needed for Pain, Disp: 15 tablet, Rfl: 0    tamsulosin (FLOMAX) 0.4 MG capsule, Take 1 capsule by mouth daily for 10 days, Disp: 10 capsule, Rfl: 0    ketorolac (TORADOL) 10 MG tablet, Take 1 tablet by mouth 3 times daily as needed for Pain, Disp: 20 tablet, Rfl: 0    ondansetron (ZOFRAN) 4 MG tablet, Take 1 tablet by mouth every 8 hours as needed for Nausea, Disp: 10 tablet, Rfl: 0    losartan (COZAAR) 100 MG tablet, Take 1 tablet by mouth daily, Disp: 90 tablet, Rfl: 1    metoprolol tartrate (LOPRESSOR) 25 MG tablet, TAKE 1 TABLET TWICE A DAY, Disp: 14 tablet, Rfl: 0    vitamin D (ERGOCALCIFEROL) 1.25 MG (32419 UT) CAPS capsule, TAKE 1 CAPSULE ONCE A WEEK, Disp: 12 capsule, Rfl: 3    atorvastatin (LIPITOR) 40 MG tablet, TAKE 1 TABLET DAILY, Disp: 90 tablet, Rfl: 3    Ascorbic Acid (VITAMIN C) 250 MG tablet, Take 250 mg by mouth daily, Disp: , Rfl:     aspirin 81 MG EC tablet, Take 81 mg by mouth 2 times daily , Disp: , Rfl:     acetaminophen (TYLENOL ARTHRITIS PAIN) 650 MG extended release tablet, Take 650 mg by mouth every 8 hours as needed for Pain, Disp: , Rfl:     sodium chloride (OCEAN, BABY AYR) 0.65 % nasal spray, 1 spray by Nasal route as needed for Congestion, Disp: , Rfl:     Multiple Vitamin (MULTI-VITAMIN) TABS, Take by mouth daily , Disp: , Rfl:       SOCIAL HISTORY     Social History     Social History Narrative    Not on file     Social History     Tobacco Use    Smoking status: Former Smoker     Packs/day: 1.50     Years: 30.00     Pack years: 45.00     Types: Cigarettes     Quit date: 6/3/2002     Years since quittin.5    Smokeless tobacco: Never Used   Substance Use Topics    Alcohol use: Yes     Comment: rarely    Drug use: No         ALLERGIES   No Known Allergies      FAMILY HISTORY     Family History   Problem Relation Age of Onset    Heart Disease Mother     Cancer Father throat    Diabetes Sister    Lyla Escobedo Asthma Brother          PREVIOUS RECORDS   Previous records reviewed: Patient was seen last on 12/20/2021 for urology stenting. PHYSICAL EXAM     ED Triage Vitals [12/24/21 1453]   BP Temp Temp Source Pulse Resp SpO2 Height Weight   (!) 163/86 98 °F (36.7 °C) Oral 73 15 96 % 5' 11\" (1.803 m) 235 lb (106.6 kg)     Initial vital signs and nursing assessment reviewed and vitals are/show: abnormal from Hypertensive. Pulsoximetry is normal per my interpretation. Additional Vital Signs:  Vitals:    12/24/21 1919   BP:    Pulse:    Resp:    Temp:    SpO2: 97%       Physical Exam  Constitutional:       General: He is not in acute distress. Appearance: Normal appearance. He is obese. He is not ill-appearing, toxic-appearing or diaphoretic. HENT:      Head: Normocephalic and atraumatic. Right Ear: External ear normal.      Left Ear: External ear normal.   Eyes:      General: No scleral icterus. Right eye: No discharge. Left eye: No discharge. Cardiovascular:      Rate and Rhythm: Normal rate and regular rhythm. Pulmonary:      Effort: Pulmonary effort is normal. No respiratory distress. Breath sounds: Normal breath sounds. No stridor. No wheezing, rhonchi or rales. Chest:      Chest wall: No tenderness. Abdominal:      General: Abdomen is flat. There is no distension. Palpations: Abdomen is soft. Tenderness: There is abdominal tenderness. There is no guarding or rebound. Comments: Suprapubic tenderness to palpation   Genitourinary:     Comments: Erythema to inguinal folds  Erythema and tenderness to palpation to scrotum  Scrotal swelling  No penile lesions drainage or discharge  No palpable hernias  Musculoskeletal:      Cervical back: Neck supple. Right lower leg: No edema. Left lower leg: No edema. Skin:     General: Skin is warm and dry.    Neurological:      Mental Status: He is alert and oriented to person, place, and time. Mental status is at baseline. Psychiatric:         Mood and Affect: Mood normal.         Behavior: Behavior normal.         Thought Content: Thought content normal.         Judgment: Judgment normal.             MEDICAL DECISION MAKING   Initial Assessment:     67 yo male presenting to the ED for scrotal swelling    Differential diagnoses include but not limited to: Postop complication infection UTI dehydration        Plan:       EKG  Labs  Imaging: CXR and CT Abd/Pel  IV Fluids  Spoke with urology believe that patient's scrotal swelling is likely orchitis and recommended ciprofloxacin  Patient discharged with oral Diflucan and ciprofloxacin         Patient is a 66 y.o. male who was seen and evaluated in the emergency department for groin swelling. Patient had a recent urological stent placed. Lab work revealed no significant abnormalities other than a mildly elevated lactic acid which improved with IV fluids. There was no sign of infection on UA or lab work. Imaging was unremarkable as well. I discussed the case with urology who believed patient symptoms likely are from orchitis and recommended Cipro. Patient's skin findings are likely from a fungal infection. Given the timing and issues with pharmacy providing medications, patient received medications in the ED as well as education on over-the-counter treatments for his fungal infection. Patient discharged. ED RESULTS   Laboratory results:  Labs Reviewed   CBC WITH AUTO DIFFERENTIAL - Abnormal; Notable for the following components:       Result Value    RBC 4.19 (*)     Hemoglobin 13.4 (*)     Hematocrit 41.9 (*)     .0 (*)     MCHC 32.0 (*)     RDW-SD 48.5 (*)     Eosinophils Absolute 0.7 (*)     All other components within normal limits   COMPREHENSIVE METABOLIC PANEL W/ REFLEX TO MG FOR LOW K - Abnormal; Notable for the following components:     Total Bilirubin 0.2 (*)     All other components within normal limits LACTATE, SEPSIS - Abnormal; Notable for the following components:    Lactic Acid, Sepsis 2.2 (*)     All other components within normal limits   GLOMERULAR FILTRATION RATE, ESTIMATED - Abnormal; Notable for the following components:    Est, Glom Filt Rate 83 (*)     All other components within normal limits   URINE WITH REFLEXED MICRO - Abnormal; Notable for the following components:    Blood, Urine LARGE (*)     All other components within normal limits   CULTURE, BLOOD 1   CULTURE, BLOOD 2   TROPONIN   BRAIN NATRIURETIC PEPTIDE   LACTATE, SEPSIS   ANION GAP   OSMOLALITY       Radiologic studies results:  CT ABDOMEN PELVIS W IV CONTRAST Additional Contrast? None   Final Result   1. Minimal right-sided hydronephrosis and perinephric stranding. 2. Nonobstructive left-sided nephrolithiasis. 3. Cholelithiasis. 4. Pancolonic diverticulosis. 5. Small bilateral adrenal nodules. Final report electronically signed by Dr. Millie St on 12/24/2021 5:36 PM      XR CHEST PORTABLE   Final Result      Bibasilar atelectasis/infiltrate. **This report has been created using voice recognition software. It may contain minor errors which are inherent in voice recognition technology. **      Final report electronically signed by Dr. Millie St on 12/24/2021 4:26 PM          ED Medications administered this visit:   Medications   0.9 % sodium chloride bolus (0 mLs IntraVENous Stopped 12/24/21 1658)   iopamidol (ISOVUE-370) 76 % injection 80 mL (80 mLs IntraVENous Given 12/24/21 1709)         ED COURSE     ED Course as of 12/24/21 1943   Fri Dec 24, 2021   1733 CXR:IMPRESSION:     Bibasilar atelectasis/infiltrate.    [CR]   5926 CT abd/pel:IMPRESSION:  1. Minimal right-sided hydronephrosis and perinephric stranding. 2. Nonobstructive left-sided nephrolithiasis. 3. Cholelithiasis. 4. Pancolonic diverticulosis.   5. Small bilateral adrenal nodules.    [CR]   1856 UA negative for infection [CR]      ED Course User Index  [CR] Matthew Rivers MD        Strict return precautions and follow up instructions were discussed with the patient prior to discharge, with which the patient agrees. MEDICATION CHANGES     New Prescriptions    CIPROFLOXACIN (CIPRO) 500 MG TABLET    Take 1 tablet by mouth daily for 1 day    CIPROFLOXACIN (CIPRO) 500 MG TABLET    Take 1 tablet by mouth daily for 2 days    FLUCONAZOLE (DIFLUCAN) 150 MG TABLET    Take 1 tablet by mouth once for 1 dose         FINAL DISPOSITION     Final diagnoses:   Orchitis   Jock itch     Condition: condition: stable  Dispo: Discharge to home      This transcription was electronically signed. Parts of this transcriptions may have been dictated by use of voice recognition software and electronically transcribed, and parts may have been transcribed with the assistance of an ED scribe. The transcription may contain errors not detected in proofreading. Please refer to my supervising physician's documentation if my documentation differs.     Electronically Signed: Matthew Rivers MD, 12/24/21, 7:43 PM         Matthew Rivers MD  Resident  12/24/21 5910

## 2021-12-25 ASSESSMENT — ENCOUNTER SYMPTOMS
EYE REDNESS: 0
BACK PAIN: 0
CHEST TIGHTNESS: 0
VOMITING: 0
RHINORRHEA: 0
COUGH: 0
NAUSEA: 0
ABDOMINAL PAIN: 1

## 2021-12-25 NOTE — ED PROVIDER NOTES
[facility] Emergency [address] [place] [address]       Chief Complaint   Patient presents with    Abdominal Pain     RLQ pain x6 hours    Back Pain     Right sided x6 hours       Nurses Notes reviewed and I agree except as noted in the HPI. HISTORY OF PRESENT ILLNESS    Yadira Ross fernando 66 y.o. male who presents to the ED for evaluation of RLQ pain and right sided flank pain for the last six hours. Some nausea. No vomiting. Has never had pain like this before. Had a hard BM earlier. Denies urinary symptoms. No blood in the stool. No fever. HPI was provided by the patient    REVIEW OF SYSTEMS     Review of Systems   Constitutional: Negative for chills, fatigue and fever. HENT: Negative for congestion, ear discharge, ear pain, postnasal drip and rhinorrhea. Eyes: Negative for redness. Respiratory: Negative for cough and chest tightness. Cardiovascular: Negative for chest pain and leg swelling. Gastrointestinal: Positive for abdominal pain. Negative for nausea and vomiting. Genitourinary: Positive for flank pain. Negative for difficulty urinating, dysuria, enuresis and hematuria. Musculoskeletal: Negative for back pain and joint swelling. Skin: Negative for rash. Neurological: Negative for dizziness, light-headedness, numbness and headaches. Psychiatric/Behavioral: Negative for agitation, behavioral problems and confusion. All other systems negative except as noted. PAST MEDICAL HISTORY     Past Medical History:   Diagnosis Date    Arthritis     ASHD (arteriosclerotic heart disease)     CAD (coronary artery disease)     Status post bypass    Colon polyps 2009      colonoscopy  tubular  adenoma   [facility]    Decreased sense of smell 5/22/2015    Epistaxis 5/22/2015    History of blood transfusion 2005    ? ???? with by pass? ??    Hyperlipidemia     Hypertension     Mild carotid artery disease ([facility] [address] 2013    Nasal obstruction 5/22/2015    Nasal septal perforation 2015       SURGICALHISTORY      has a past surgical history that includes Colonoscopy (2019); Coronary artery bypass graft (2005); Cardiac surgery (); Tonsillectomy; Nasal polyp surgery (2015); Colonoscopy (Left, 2019); Excision of Aural Mass; Skin cancer excision (); and Bladder surgery (Right, 2021). CURRENT MEDICATIONS       Discharge Medication List as of 2021  8:23 AM      CONTINUE these medications which have NOT CHANGED    Details   losartan (COZAAR) 100 MG tablet Take 1 tablet by mouth daily, Disp-90 tablet, R-1Normal      metoprolol tartrate (LOPRESSOR) 25 MG tablet TAKE 1 TABLET TWICE A DAY, Disp-14 tablet, R-0Normal      vitamin D (ERGOCALCIFEROL) 1.25 MG (70241 UT) CAPS capsule TAKE 1 CAPSULE ONCE A WEEK, Disp-12 capsule, R-3Normal      atorvastatin (LIPITOR) 40 MG tablet TAKE 1 TABLET DAILY, Disp-90 tablet, R-3Normal      Ascorbic Acid (VITAMIN C) 250 MG tablet Take 250 mg by mouth dailyHistorical Med      aspirin 81 MG EC tablet Take 81 mg by mouth 2 times daily Historical Med      acetaminophen (TYLENOL ARTHRITIS PAIN) 650 MG extended release tablet Take 650 mg by mouth every 8 hours as needed for PainHistorical Med      sodium chloride (OCEAN, BABY AYR) 0.65 % nasal spray 1 spray by Nasal route as needed for CongestionHistorical Med      Multiple Vitamin (MULTI-VITAMIN) TABS Take by mouth daily Historical Med             ALLERGIES     has No Known Allergies. FAMILY HISTORY     He indicated that his mother is . He indicated that his father is . He indicated that his sister is alive. He indicated that his brother is . family history includes Asthma in his brother; Cancer in his father; Diabetes in his sister; Heart Disease in his mother.     SOCIAL HISTORY       Social History     Socioeconomic History    Marital status:      Spouse name: Not on file    Number of children: Not on file    Years of education: Not on file    Highest education level: Not on file   Occupational History    Not on file   Tobacco Use    Smoking status: Former Smoker     Packs/day: 1.50     Years: 30.00     Pack years: 45.00     Types: Cigarettes     Quit date: 6/3/2002     Years since quittin.5    Smokeless tobacco: Never Used   Substance and Sexual Activity    Alcohol use: Yes     Comment: rarely    Drug use: No    Sexual activity: Not Currently     Partners: Female   Other Topics Concern    Not on file   Social History Narrative    Not on file     Social Determinants of Health     Financial Resource Strain: Low Risk     Difficulty of Paying Living Expenses: Not hard at all   Food Insecurity: No Food Insecurity    Worried About [address] [employer] in the Last Year: Never true    [employer] in the Last Year: Never true   Transportation Needs:     Lack of Transportation (Medical): Not on file    Lack of Transportation (Non-Medical): Not on file   Physical Activity:     Days of Exercise per Week: Not on file    Minutes of Exercise per Session: Not on file   Stress:     Feeling of Stress : Not on file   Social Connections:     Frequency of Communication with Friends and Family: Not on file    Frequency of Social Gatherings with Friends and Family: Not on file    Attends Worship Services: Not on file    Active Member of [address] [place] [address] or Organizations: Not on file    Attends Club or Organization Meetings: Not on file    Marital Status: Not on file   Intimate Partner Violence:     Fear of Current or Ex-Partner: Not on file    Emotionally Abused: Not on file    Physically Abused: Not on file    Sexually Abused: Not on file   Housing Stability:     Unable to Pay for Housing in the Last Year: Not on file    Number of [place] in the Last Year: Not on file    Unstable Housing in the Last Year: Not on file       PHYSICAL EXAM     INITIAL VITALS:  oral temperature is 97.6 °F (36.4 °C).  His blood pressure is 168/82 (abnormal) and his pulse is 70. His respiration is 19 and oxygen saturation is 95%. Physical Exam  Vitals and nursing note reviewed. Constitutional:       General: He is not in acute distress. Appearance: He is well-developed. He is not diaphoretic. HENT:      Head: Normocephalic and atraumatic. Eyes:      General:         Right eye: No discharge. Left eye: No discharge. Conjunctiva/sclera: Conjunctivae normal.   Neck:      Trachea: No tracheal deviation. Cardiovascular:      Rate and Rhythm: Normal rate and regular rhythm. Heart sounds: Normal heart sounds. No murmur heard. No gallop. Comments: Normal capillary refill  Pulmonary:      Effort: Pulmonary effort is normal. No respiratory distress. Breath sounds: Normal breath sounds. No stridor. Abdominal:      General: Bowel sounds are normal.      Palpations: Abdomen is soft. Tenderness: There is abdominal tenderness in the right lower quadrant. There is right CVA tenderness. Musculoskeletal:         General: No tenderness or deformity. Normal range of motion. Cervical back: Normal range of motion. Skin:     General: Skin is warm and dry. Capillary Refill: Capillary refill takes less than 2 seconds. Coloration: Skin is not pale. Findings: No erythema or rash. Neurological:      General: No focal deficit present. Mental Status: He is alert and oriented to person, place, and time. Cranial Nerves: No cranial nerve deficit.    Psychiatric:         Behavior: Behavior normal.         DIFFERENTIAL DIAGNOSIS:   Kidney stone, UTI, less likely appendicitis  DIAGNOSTIC RESULTS     EKG: All EKG's are interpreted by the Emergency Department Physician who eithersigns or Co-signs this chart in the absence of a cardiologist.        RADIOLOGY: non-plainfilm images(s) such as CT, Ultrasound and MRI are read by the radiologist.  Plain radiographic images are visualized and preliminarily interpreted by the emergency physician unless otherwise stated below. CT ABDOMEN PELVIS WO CONTRAST Additional Contrast? None   Final Result   Impression:   1. Obstruction of the distal right ureter associated with moderate right    hydronephrosis and right perinephric fluid. This obstruction may be    secondary to presence of a poorly radiopaque calculus. However, an    obstructing ureteral lesion is not excluded and follow-up is necessary. 2. Nonobstructing left renal calculus. 3. Cholelithiasis. 4. Colonic diverticulosis. 5. Right basilar pulmonary nodule. Given the size of this nodule,    additional imaging assessment of the chest may be considered to assess for    the presence of other lesions. Alternatively, as per the Fleischner    criteria of 2017, a follow-up CT should be considered in 6 months time to    assess for its stability. 6. Bilateral adrenal nodules. In the absence of a known primary    malignancy, these are likely to represent adrenal adenomas. Clinical    correlation is necessary. If clinically warranted, definitive assessment    of these lesions with in and out of phase MR imaging of the adrenal glands    could be obtained. This document has been electronically signed by: Sil Piemntel MD on    12/17/2021 06:10 AM      All CTs at this facility use dose modulation techniques and iterative    reconstructions, and/or weight-based dosing   when appropriate to reduce radiation to a low as reasonably achievable.             LABS:   Labs Reviewed   CBC WITH AUTO DIFFERENTIAL - Abnormal; Notable for the following components:       Result Value    WBC 15.7 (*)     RBC 4.51 (*)     MCV 98.4 (*)     RDW-SD 47.5 (*)     Segs Absolute 12.3 (*)     All other components within normal limits   URINE WITH REFLEXED MICRO - Abnormal; Notable for the following components:    Blood, Urine TRACE (*)     All other components within normal limits   BASIC METABOLIC PANEL - Abnormal; Notable for the following components:    CO2 22 (*)     Glucose 134 (*)     CREATININE 1.3 (*)     All other components within normal limits   PSA PROSTATIC SPECIFIC ANTIGEN - Abnormal; Notable for the following components:    PSA 5.11 (*)     All other components within normal limits   GLOMERULAR FILTRATION RATE, ESTIMATED - Abnormal; Notable for the following components:    Est, Glom Filt Rate 54 (*)     All other components within normal limits   LIPID PANEL   SPECIMEN REJECTION   SPECIMEN REJECTION   ANION GAP   OSMOLALITY       EMERGENCY DEPARTMENT COURSE:   Vitals:    Vitals:    12/17/21 0454 12/17/21 0650   BP: (!) 162/85 (!) 168/82   Pulse: 62 70   Resp: 18 19   Temp: 97.6 °F (36.4 °C)    TempSrc: Oral    SpO2: 96% 95%                             MDM    Patient was seen in the ER for flank pain. Appropriate labs and imaging are ordered and reviewed. Care is transferred to [place], [place]. See his documentation for final disposition     Medications   ketorolac (TORADOL) injection 15 mg (15 mg IntraVENous Given 12/17/21 0542)   ondansetron (ZOFRAN) injection 4 mg (4 mg IntraVENous Given 12/17/21 0554)   tamsulosin (FLOMAX) capsule 0.4 mg (0.4 mg Oral Given 12/17/21 0711)       Please note that the patient was evaluated during a pandemic. All efforts were made for HIPPA compliance as well as provision of appropriate care. Patient was seen independently by myself. The patient's final impression and disposition and plan was determined by myself. Strict return precautions and follow up instructions were discussed with the patient prior to discharge, with which the patient agrees. Physical assessment findings, diagnostic testing(s) if applicable, and vital signs reviewed with patient/patient representative. Questions answered. Medications asdirected, including OTC medications for supportive care. Education provided on medications. Differential diagnosis(s) discussed with patient/patient representative.   Home care/self care instructions reviewed withpatient/patient representative. Patient is to follow-up with family care provider in 2-3 days if no improvement. Patient is to go to the emergency department if symptoms worsen. Patient/patient representative isaware of care plan, questions answered, verbalizes understanding and is in agreement. CRITICAL CARE:   None    CONSULTS:  None    PROCEDURES:  None    FINAL IMPRESSION     1.  Calculus of kidney with calculus of ureter          DISPOSITION/PLAN   DISPOSITION Decision To Discharge 12/17/2021 08:16:44 AM      PATIENT REFERREDTO:  Gonzalo Resendez MD  [address] [phone]    In 3 days  For re-evaluation    [facility] EMERGENCY DEPT  [address] [address]  [phone]  In 2 days  If symptoms worsen      DISCHARGE MEDICATIONS:  Discharge Medication List as of 12/17/2021  8:23 AM      START taking these medications    Details   tamsulosin (FLOMAX) 0.4 MG capsule Take 1 capsule by mouth daily for 10 days, Disp-10 capsule, R-0Normal      ketorolac (TORADOL) 10 MG tablet Take 1 tablet by mouth 3 times daily as needed for Pain, Disp-20 tablet, R-0Normal      ondansetron (ZOFRAN) 4 MG tablet Take 1 tablet by mouth every 8 hours as needed for Nausea, Disp-10 tablet, R-0Normal             (Please note that portions of this note were completed with a voice recognition program.  Efforts were made to edit the dictations but occasionally words are mis-transcribed.)         RASHAD Jade CNP, APRN - CNP  12/25/21 0132

## 2021-12-27 ENCOUNTER — OFFICE VISIT (OUTPATIENT)
Dept: UROLOGY | Age: 71
End: 2021-12-27
Payer: MEDICARE

## 2021-12-27 VITALS
SYSTOLIC BLOOD PRESSURE: 144 MMHG | WEIGHT: 246 LBS | BODY MASS INDEX: 34.44 KG/M2 | DIASTOLIC BLOOD PRESSURE: 86 MMHG | HEIGHT: 71 IN

## 2021-12-27 DIAGNOSIS — Z01.818 PRE-OP TESTING: Primary | ICD-10-CM

## 2021-12-27 DIAGNOSIS — N40.1 BPH WITH OBSTRUCTION/LOWER URINARY TRACT SYMPTOMS: ICD-10-CM

## 2021-12-27 DIAGNOSIS — N13.5 URETER, STRICTURE: ICD-10-CM

## 2021-12-27 DIAGNOSIS — N13.8 BPH WITH OBSTRUCTION/LOWER URINARY TRACT SYMPTOMS: ICD-10-CM

## 2021-12-27 LAB
BILIRUBIN URINE: NEGATIVE
BLOOD URINE, POC: ABNORMAL
CHARACTER, URINE: CLEAR
COLOR, URINE: YELLOW
GLUCOSE URINE: NEGATIVE MG/DL
KETONES, URINE: NEGATIVE
LEUKOCYTE CLUMPS, URINE: NEGATIVE
NITRITE, URINE: NEGATIVE
PH, URINE: 7 (ref 5–9)
PROTEIN, URINE: 100 MG/DL
SPECIFIC GRAVITY, URINE: 1.02 (ref 1–1.03)
UROBILINOGEN, URINE: 0.2 EU/DL (ref 0–1)

## 2021-12-27 PROCEDURE — 99214 OFFICE O/P EST MOD 30 MIN: CPT | Performed by: UROLOGY

## 2021-12-27 PROCEDURE — 81003 URINALYSIS AUTO W/O SCOPE: CPT | Performed by: UROLOGY

## 2021-12-27 RX ORDER — FLUCONAZOLE 100 MG/1
100 TABLET ORAL DAILY
Qty: 8 TABLET | Refills: 0 | Status: SHIPPED | OUTPATIENT
Start: 2021-12-27 | End: 2022-01-03

## 2021-12-27 NOTE — PROGRESS NOTES
MD MD [facility] Consultation / New Patient Visit    Patient:  Brain Goncalves  YOB: 1950  Date: 12/27/2021  Consult requested from Paxton Barnes MD     HISTORY OF PRESENT ILLNESS:   The patient is a 66 y.o. male who presents today for follow-up for the following problem(s): Right ureteral stricture  Overall the problem(s) : are worsening. Associated Symptoms: No dysuria, gross hematuria. Pain Severity:      Today visit:   12/27/21   Presents with history of right hydronephrosis, was evaluated with ureteroscopy and biopsy of dense stricture. Path:  FINAL DIAGNOSIS:   Right ureter, biopsies:   Amorphous eosinophilic fibrinous debris and rare benign-appearing epithelial cells. Nondiagnostic specimen.      Summary of old records:   (Patient's old records, notes and chart reviewed and summarized above.)    Last several PSA's:  Lab Results   Component Value Date    PSA 5.11 (H) 12/17/2021    PSA 3.89 (H) 07/10/2019    PSA 4.18 (H) 05/17/2018       Last total testosterone:  No results found for: TESTOSTERONE    Urinalysis today:  Results for POC orders placed in visit on 12/27/21   POCT Urinalysis No Micro (Auto)   Result Value Ref Range    Glucose, Ur Negative NEGATIVE mg/dl    Bilirubin Urine Negative     Ketones, Urine Negative NEGATIVE    Specific Gravity, Urine 1.025 1.002 - 1.030    Blood, UA POC Large (A) NEGATIVE    pH, Urine 7.00 5.0 - 9.0    Protein, Urine 100 (A) NEGATIVE mg/dl    Urobilinogen, Urine 0.20 0.0 - 1.0 eu/dl    Nitrite, Urine Negative NEGATIVE    Leukocyte Clumps, Urine Negative NEGATIVE    Color, Urine Yellow YELLOW-STRAW    Character, Urine Clear CLR-SL.CLOUD         Last BUN and creatinine:  Lab Results   Component Value Date    BUN 18 12/24/2021     Lab Results   Component Value Date    CREATININE 0.9 12/24/2021       Imaging Reviewed during this Office Visit:   (results were independently reviewed by physician and radiology report verified)    PAST MEDICAL, FAMILY AND SOCIAL HISTORY:  Past Medical History:   Diagnosis Date    Arthritis     ASHD (arteriosclerotic heart disease)     CAD (coronary artery disease)     Status post bypass    Colon polyps       colonoscopy  tubular  adenoma   rinesmith    Decreased sense of smell 2015    Epistaxis 2015    History of blood transfusion 2005    ? ???? with by pass? ??    Hyperlipidemia     Hypertension     Mild carotid artery disease ([address] 2013    Nasal obstruction 2015    Nasal septal perforation 2015     Past Surgical History:   Procedure Laterality Date    BLADDER SURGERY Right 2021    CYSTO, URETEROSCOPY, RIGHT RETROGRADE PYELOGRAM, URETERAL DILATION, WITH URETERAL BX RIGHT URETERAL STENT PLACEMENT performed by Noemy Tang MD at [address]      6 by pass    COLONOSCOPY  2019    Dr Lopez Whitlock  colon polyps 2014    COLONOSCOPY Left 2019    COLONOSCOPY POLYPECTOMY SNARE/COLD BIOPSY performed by Elbert Avelar MD at [address]  2005    EXCISION OF AURAL MASS      NASAL POLYP SURGERY  2015    BIOPSY OF NASAL POLYP MIDDLE TURBINATE AND BIOPSY SEPTAL  [address] SKIN CANCER EXCISION      on back    TONSILLECTOMY      as a child     Family History   Problem Relation Age of Onset    Heart Disease Mother     Cancer Father         throat    Diabetes Sister     Asthma Brother      No outpatient medications have been marked as taking for the 21 encounter (Office Visit) with Noemy Tang MD.       Patient has no known allergies.   Social History     Tobacco Use   Smoking Status Former Smoker    Packs/day: 1.50    Years: 30.00    Pack years: 45.00    Types: Cigarettes    Quit date: 6/3/2002    Years since quittin.5   Smokeless Tobacco Never Used       Social History     Substance and Sexual Activity   Alcohol Use Yes Comment: rarely       REVIEW OF SYSTEMS:  Constitutional: negative  Eyes: negative  Respiratory: negative  Cardiovascular: negative  Gastrointestinal: negative  Musculoskeletal: negative  Genitourinary: negative  Skin: negative   Neurological: negative  Hematological/Lymphatic: negative  Psychological: negative    Physical Exam:    This a 66 y.o. male   Vitals:    12/27/21 1603   BP: (!) 144/86     Constitutional: Patient in no acute distress   Neuro: alert and oriented to person place and time. Psych: Mood and affect normal.  Head: atraumatic normocephalic  Eyes: EOMi  HEENT: neck supple, trachea midline  Lungs: Respiratory effort normal  Cardiovascular:  Normal peripheral pulses  Abdomen: Soft, non-tender, non-distended, No CVA  Bladder: non-tender and not distended. FROMx4, no cyanosis clubbing edema  Skin: warm and dry        Assessment and Plan      1. Pre-op testing    2. Ureter, stricture    3. BPH with obstruction/lower urinary tract symptoms           Plan:      No follow-ups on file.   Cysto and rgpg and stent removal vs replacement of stent - Dr. Yvrose Amin    Repeat PSA in 6 months

## 2021-12-30 LAB
BLOOD CULTURE, ROUTINE: NORMAL
BLOOD CULTURE, ROUTINE: NORMAL

## 2022-01-06 ENCOUNTER — PREP FOR PROCEDURE (OUTPATIENT)
Dept: UROLOGY | Age: 72
End: 2022-01-06

## 2022-01-06 RX ORDER — SODIUM CHLORIDE 9 MG/ML
INJECTION, SOLUTION INTRAVENOUS CONTINUOUS
Status: CANCELLED | OUTPATIENT
Start: 2022-01-31

## 2022-01-17 ENCOUNTER — HOSPITAL ENCOUNTER (OUTPATIENT)
Age: 72
Discharge: HOME | End: 2022-01-17
Payer: MEDICARE

## 2022-01-17 DIAGNOSIS — N13.5 URETER, STRICTURE: ICD-10-CM

## 2022-01-17 DIAGNOSIS — Z01.818 PRE-OP TESTING: ICD-10-CM

## 2022-01-17 PROCEDURE — 87086 URINE CULTURE/COLONY COUNT: CPT

## 2022-01-18 LAB
ORGANISM: ABNORMAL
URINE CULTURE, ROUTINE: ABNORMAL

## 2022-01-19 ENCOUNTER — TELEPHONE (OUTPATIENT)
Dept: UROLOGY | Age: 72
End: 2022-01-19

## 2022-01-24 ENCOUNTER — TELEPHONE (OUTPATIENT)
Dept: UROLOGY | Age: 72
End: 2022-01-24

## 2022-01-24 NOTE — TELEPHONE ENCOUNTER
Patient notified of new surgery arrival time. Patient is to be at [address] [place] [address] Same day surgery by  7:00am   on  1/31/2022. Patient reminded to have nothing to eat or drink after midnight. Patient voiced understanding. OR notified.

## 2022-01-26 ENCOUNTER — ANESTHESIA EVENT (OUTPATIENT)
Dept: OPERATING ROOM | Age: 72
End: 2022-01-26
Payer: MEDICARE

## 2022-01-26 NOTE — PROGRESS NOTES
PAT call attempted, patient unavailable, left message to please call us back at your earliest convenience; [phone]

## 2022-01-27 NOTE — PROGRESS NOTES
Follow all instructions given by your physician    NPO after midnight   Sips of water am of surgery with allowed medications  Bring insurance info and 's license  Wear comfortable clean clothing  No jewelry or contact lenses to be worn day of surgery  No glue on dentures morning of surgery;you will be asked to remove them for surgery. Case for glasses. Shower night before and morning of surgery with a liquid antibacterial soap, dry with fresh clean towel; no lotions, creams or powder. Clean sheets and pillow case on bed night before surgery  Bring medications in original bottles  Bring CPAP/BIPAP machine if you have one ( you may be charged if one is needed in recovery room )   needed at discharge and someone over 18 to stay with you for 24 hours overnight (surgery may be cancelled if you don't have this)  Report to [facility] on 2nd floor  If you would become ill prior to surgery, please call the surgeon  May have a visitor with you, we request that you limit to 2 visitors in pre-op area  Please bring and wear mask  Call [phone] for any questions  Covid questionnaire Complete; Patient negative for symptoms or exposure. See documentation.

## 2022-01-31 ENCOUNTER — APPOINTMENT (OUTPATIENT)
Dept: GENERAL RADIOLOGY | Age: 72
End: 2022-01-31
Attending: UROLOGY
Payer: MEDICARE

## 2022-01-31 ENCOUNTER — ANESTHESIA (OUTPATIENT)
Dept: OPERATING ROOM | Age: 72
End: 2022-01-31
Payer: MEDICARE

## 2022-01-31 ENCOUNTER — HOSPITAL ENCOUNTER (OUTPATIENT)
Age: 72
Setting detail: OUTPATIENT SURGERY
Discharge: HOME | End: 2022-01-31
Attending: UROLOGY | Admitting: UROLOGY
Payer: MEDICARE

## 2022-01-31 VITALS — DIASTOLIC BLOOD PRESSURE: 63 MMHG | OXYGEN SATURATION: 99 % | TEMPERATURE: 96.8 F | SYSTOLIC BLOOD PRESSURE: 130 MMHG

## 2022-01-31 VITALS
BODY MASS INDEX: 33.1 KG/M2 | OXYGEN SATURATION: 96 % | WEIGHT: 236.4 LBS | RESPIRATION RATE: 18 BRPM | HEIGHT: 71 IN | DIASTOLIC BLOOD PRESSURE: 80 MMHG | SYSTOLIC BLOOD PRESSURE: 187 MMHG | HEART RATE: 56 BPM | TEMPERATURE: 97.1 F

## 2022-01-31 PROCEDURE — 3600000012 HC SURGERY LEVEL 2 ADDTL 15MIN: Performed by: UROLOGY

## 2022-01-31 PROCEDURE — 2580000003 HC RX 258: Performed by: UROLOGY

## 2022-01-31 PROCEDURE — 6360000002 HC RX W HCPCS: Performed by: NURSE ANESTHETIST, CERTIFIED REGISTERED

## 2022-01-31 PROCEDURE — 2709999900 HC NON-CHARGEABLE SUPPLY: Performed by: UROLOGY

## 2022-01-31 PROCEDURE — 3600000002 HC SURGERY LEVEL 2 BASE: Performed by: UROLOGY

## 2022-01-31 PROCEDURE — 7100000010 HC PHASE II RECOVERY - FIRST 15 MIN: Performed by: UROLOGY

## 2022-01-31 PROCEDURE — 3700000001 HC ADD 15 MINUTES (ANESTHESIA): Performed by: UROLOGY

## 2022-01-31 PROCEDURE — 7100000011 HC PHASE II RECOVERY - ADDTL 15 MIN: Performed by: UROLOGY

## 2022-01-31 PROCEDURE — 3700000000 HC ANESTHESIA ATTENDED CARE: Performed by: UROLOGY

## 2022-01-31 PROCEDURE — C2617 STENT, NON-COR, TEM W/O DEL: HCPCS | Performed by: UROLOGY

## 2022-01-31 PROCEDURE — 6360000002 HC RX W HCPCS: Performed by: UROLOGY

## 2022-01-31 PROCEDURE — 74420 UROGRAPHY RTRGR +-KUB: CPT

## 2022-01-31 DEVICE — URETERAL STENT
Type: IMPLANTABLE DEVICE | Site: URETER | Status: FUNCTIONAL
Brand: PERCUFLEX™ PLUS

## 2022-01-31 RX ORDER — PROPOFOL 10 MG/ML
INJECTION, EMULSION INTRAVENOUS PRN
Status: DISCONTINUED | OUTPATIENT
Start: 2022-01-31 | End: 2022-01-31 | Stop reason: SDUPTHER

## 2022-01-31 RX ORDER — FENTANYL CITRATE 50 UG/ML
INJECTION, SOLUTION INTRAMUSCULAR; INTRAVENOUS PRN
Status: DISCONTINUED | OUTPATIENT
Start: 2022-01-31 | End: 2022-01-31 | Stop reason: SDUPTHER

## 2022-01-31 RX ORDER — SODIUM CHLORIDE 9 MG/ML
INJECTION, SOLUTION INTRAVENOUS CONTINUOUS
Status: DISCONTINUED | OUTPATIENT
Start: 2022-01-31 | End: 2022-01-31 | Stop reason: HOSPADM

## 2022-01-31 RX ORDER — SULFAMETHOXAZOLE AND TRIMETHOPRIM 800; 160 MG/1; MG/1
1 TABLET ORAL 2 TIMES DAILY
Qty: 10 TABLET | Refills: 0 | Status: SHIPPED | OUTPATIENT
Start: 2022-01-31 | End: 2022-02-05

## 2022-01-31 RX ADMIN — FENTANYL CITRATE 25 MCG: 50 INJECTION, SOLUTION INTRAMUSCULAR; INTRAVENOUS at 10:38

## 2022-01-31 RX ADMIN — CEFAZOLIN SODIUM 2000 MG: 10 INJECTION, POWDER, FOR SOLUTION INTRAVENOUS at 10:36

## 2022-01-31 RX ADMIN — PROPOFOL 300 MG: 10 INJECTION, EMULSION INTRAVENOUS at 10:34

## 2022-01-31 RX ADMIN — SODIUM CHLORIDE: 9 INJECTION, SOLUTION INTRAVENOUS at 08:06

## 2022-01-31 RX ADMIN — FENTANYL CITRATE 25 MCG: 50 INJECTION, SOLUTION INTRAMUSCULAR; INTRAVENOUS at 10:47

## 2022-01-31 RX ADMIN — Medication 80 MG: at 10:28

## 2022-01-31 ASSESSMENT — PULMONARY FUNCTION TESTS
PIF_VALUE: 0
PIF_VALUE: 1
PIF_VALUE: 0
PIF_VALUE: 1
PIF_VALUE: 0
PIF_VALUE: 1
PIF_VALUE: 0
PIF_VALUE: 1
PIF_VALUE: 0

## 2022-01-31 ASSESSMENT — PAIN - FUNCTIONAL ASSESSMENT: PAIN_FUNCTIONAL_ASSESSMENT: 0-10

## 2022-01-31 ASSESSMENT — PAIN SCALES - GENERAL
PAINLEVEL_OUTOF10: 0

## 2022-01-31 NOTE — PROGRESS NOTES
Patient in Same Day Surgery with family present. Patient verified surgical and anesthesia consents. Patient arrived to OR with no hearing aides, dentures, jewelry, glasses or clothing.

## 2022-01-31 NOTE — PROGRESS NOTES
Pt has met discharge criteria and states he is ready for discharge to home. IV removed, gauze and tape applied. Dressed in own clothes and personal belongings gathered. Discharge instructions  given to pt and family; pt and family verbalized understanding of discharge instructions, prescriptions and follow up appointments. Pt transported to discharge lobby by [facility] staff.

## 2022-01-31 NOTE — ANESTHESIA POSTPROCEDURE EVALUATION
Department of Anesthesiology  Postprocedure Note    Patient: Fe De Souza  MRN: [id number]  YOB: 1950  Date of evaluation: 1/31/2022  Time:  11:50 AM     Procedure Summary     Date: 01/31/22 Room / Location: [facility] OR 06 / Theresia Bosworth    Anesthesia Start: 3737 Anesthesia Stop: 5316    Procedure: CYSTOSCOPY RIGHT RETROGRADE PYELOGRAM, RIGHT STENT EXCHANGE (Right Ureter) Diagnosis: (URETHERAL STRICTURE)    Surgeons: Brooklyn Carrasquillo MD Responsible Provider: Remberto Oliveira MD    Anesthesia Type: MAC ASA Status: 3          Anesthesia Type: MAC    Moira Phase I: Moira Score: 10    Moira Phase II: Moira Score: 10    Last vitals: Reviewed and per EMR flowsheets.        Anesthesia Post Evaluation    Patient location during evaluation: bedside  Patient participation: complete - patient participated  Level of consciousness: awake and alert  Airway patency: patent  Nausea & Vomiting: no nausea and no vomiting  Complications: no  Cardiovascular status: hemodynamically stable  Respiratory status: spontaneous ventilation and acceptable  Hydration status: euvolemic

## 2022-01-31 NOTE — PROGRESS NOTES
ADMITTED TO [facility] AND ORIENTED TO UNIT. SCDS ON. FALL BAND ON. PT VERBALIZED APPROVAL FOR FIRST NAME, LAST INITIAL AND PHYSICIAN NAME ON UNIT WHITEBOARD. Spouse, Omaha Paget with the patient.

## 2022-01-31 NOTE — H&P
[facility]     Patient:  Alix Alex  MRN: [id number]  YOB: 1950    ATTENDING: Christiano Rushing MD     CHIEF COMPLAINT:  Right hydronephrosis    HISTORY OF PRESENT ILLNESS:   The patient is a 66 y.o. male who presents with right hydronephrosis. Here for retrograde pyelogram and possible stent removal after ureteral stricture was dilated. Patient's old records, notes and chart reviewed and summarized above. Past Medical History:    Past Medical History:   Diagnosis Date    Arthritis     ASHD (arteriosclerotic heart disease)     CAD (coronary artery disease)     Status post bypass    Colon polyps 2009      colonoscopy  tubular  adenoma   rinesmith    Decreased sense of smell 5/22/2015    Epistaxis 5/22/2015    History of blood transfusion 2005    ? ???? with by pass? ??    Hyperlipidemia     Hypertension     Kidney stones     Mild carotid artery disease ([facility] [address] 2013    Nasal obstruction 5/22/2015    Nasal septal perforation 5/22/2015       Past Surgical History:    Past Surgical History:   Procedure Laterality Date    BLADDER SURGERY Right 12/20/2021    CYSTO, URETEROSCOPY, RIGHT RETROGRADE PYELOGRAM, URETERAL DILATION, WITH URETERAL BX RIGHT URETERAL STENT PLACEMENT performed by Raquel Heller MD at [address]  2005    6 by pass    COLONOSCOPY  12/2019    Dr Dhaval Ritter  colon polyps 8//2014 9-2019    COLONOSCOPY Left 09/13/2019    COLONOSCOPY POLYPECTOMY SNARE/COLD BIOPSY performed by Woody Gallardo MD at [address]  11/2005    EXCISION OF AURAL MASS      NASAL POLYP SURGERY  06/05/2015    BIOPSY OF NASAL POLYP MIDDLE TURBINATE AND BIOPSY SEPTAL [address] SKIN CANCER EXCISION  2018    on back    TONSILLECTOMY      as a child       Medications Prior to Admission:   Prior to Admission medications    Medication Sig Start Date End Date Taking? Authorizing Provider   ketorolac (TORADOL) 10 MG tablet Take 1 tablet by mouth every 6 hours as needed for Pain 21  Yes Tiffanie Gibbs MD   ketorolac (TORADOL) 10 MG tablet Take 1 tablet by mouth 3 times daily as needed for Pain 21 Yes Dick Pradhan PA-C   losartan (COZAAR) 100 MG tablet Take 1 tablet by mouth daily 10/30/21  Yes Radha Gaines MD   metoprolol tartrate (LOPRESSOR) 25 MG tablet TAKE 1 TABLET TWICE A DAY 5/10/21  Yes Radha Gaines MD   atorvastatin (LIPITOR) 40 MG tablet TAKE 1 TABLET DAILY 3/9/21  Yes RASHAD Osman CNP   Ascorbic Acid (VITAMIN C) 250 MG tablet Take 250 mg by mouth daily   Yes Historical Provider, MD   aspirin 81 MG EC tablet Take 81 mg by mouth 2 times daily    Yes Historical Provider, MD   acetaminophen (TYLENOL ARTHRITIS PAIN) 650 MG extended release tablet Take 650 mg by mouth every 8 hours as needed for Pain   Yes Historical Provider, MD   sodium chloride (OCEAN, BABY AYR) 0.65 % nasal spray 1 spray by Nasal route as needed for Congestion   Yes Historical Provider, MD   Multiple Vitamin (MULTI-VITAMIN) TABS Take by mouth daily    Yes Historical Provider, MD   vitamin D (ERGOCALCIFEROL) 1.25 MG (79556 UT) CAPS capsule TAKE 1 CAPSULE ONCE A WEEK 3/19/21   RASHAD Osman CNP       Allergies:  Patient has no known allergies.     Social History:    Social History     Socioeconomic History    Marital status:      Spouse name: Not on file    Number of children: Not on file    Years of education: Not on file    Highest education level: Not on file   Occupational History    Not on file   Tobacco Use    Smoking status: Former Smoker     Packs/day: 1.50     Years: 30.00     Pack years: 45.00     Types: Cigarettes     Quit date: 6/3/2002     Years since quittin.6    Smokeless tobacco: Never Used   Vaping Use    Vaping Use: Never used   Substance and Sexual Activity    Alcohol use: Yes     Comment: seldom    Drug use: No    Sexual activity: Not Currently     Partners: Female   Other Topics Concern    Not on file   Social History Narrative    Not on file     Social Determinants of Health     Financial Resource Strain: Low Risk     Difficulty of Paying Living Expenses: Not hard at all   Food Insecurity: No Food Insecurity    Worried About Running Out of Food in the Last Year: Never true    [address] in the Last Year: Never true   Transportation Needs:     Lack of Transportation (Medical): Not on file    Lack of Transportation (Non-Medical): Not on file   Physical Activity:     Days of Exercise per Week: Not on file    Minutes of Exercise per Session: Not on file   Stress:     Feeling of Stress : Not on file   Social Connections:     Frequency of Communication with Friends and Family: Not on file    Frequency of Social Gatherings with Friends and Family: Not on file    Attends Restorationist Services: Not on file    Active Member of [address] [place] [address] or Organizations: Not on file    Attends Club or Organization Meetings: Not on file    Marital Status: Not on file   Intimate Partner Violence:     Fear of Current or Ex-Partner: Not on file    Emotionally Abused: Not on file    Physically Abused: Not on file    Sexually Abused: Not on file   Housing Stability:     Unable to Pay for Housing in the Last Year: Not on file    Number of [place] in the Last Year: Not on file    Unstable Housing in the Last Year: Not on file       Family History:    Family History   Problem Relation Age of Onset    Heart Disease Mother     Cancer Father         throat    Diabetes Sister     Asthma Brother        REVIEW OF SYSTEMS:  All systems reviewed and negative except for that already noted in the HPI.     Physical Exam:      Patient Vitals for the past 24 hrs:   BP Temp Temp src Pulse Resp SpO2 Height Weight   01/31/22 0730 (!) 188/85 98 °F (36.7 °C) Temporal 66 18 95 % 5' 11\" (1.803 m) 236 lb 6.4 oz (107.2 kg) Constitutional: Patient in no acute distress; Neuro: alert and oriented to person place and time. Psych: Mood and affect normal.  Skin: Normal  Lungs: Respiratory effort normal  Cardiovascular:  Normal peripheral pulses  Abdomen: Soft, non-tender, non-distended with no CVA, flank pain, hepatosplenomegaly or hernia. Kidneys normal.  Bladder non-tender and not distended. Lymphatics: no palpable lymphadenopathy        Assessment and Plan   Impression:    Patient Active Problem List   Diagnosis    CAD (coronary artery disease)    Hypertension    Hyperlipidemia    S/P CABG x 6,2007    CAD (coronary artery disease)    Colon polyps       Plan:   Cysto right retrograde pyelogram with stent removal vs exchange  Possible ureteral dilation, possible ureteroscopy  Risks benefits and alternative procedures are explained, informed consent is obtained, and the patient elects to proceed.

## 2022-01-31 NOTE — OP NOTE
FACILITY: [address] [place] [address] [facility] [address] [place] [address]  8/81/0092  [id number]    DATE: 01/31/22  SURGEON:  Dr. Dony Reyes MD , MD    ASSISTANT: Dr. Dony Reyes MD MD  PREOPERATIVE DIAGNOSIS:  Right ureteral stricture.    POSTOPERATIVE DIAGNOSIS:  Same.   PROCEDURES PERFORMED:  1. Cystourethroscopy. 2. Right retrograde pyelogram.  3. Right ureteroscopy  4. Right ureteral stent exchang3  ANESTHESIA:  MAC    COMPLICATIONS:  None.   DRAINS:  None. SPECIMEN:  none  ESTIMATED BLOOD LOSS:  Less than 5 mL.      INDICATIONS FOR THE PROCEDURE:  Skinny Singh is a 66 y.o. male presents with a history of right hydronephrosis found to have a right ureteral stricture. Imaging was done in the form of CT, which showed possibel ureteral stricture. Previous dilation. The risks and benefits of the procedure, as well as possible alternatives and complications were discussed and he consented.          DETAILS OF THE PROCEDURE:  The patient was correctly identified in the preoperative holding area. The patient  was brought back to the operating room and placed in the dorsal lithotomy  position. Anesthesia was administered; antibiotics administered by Anesthesia. EPC cuffs  were on and functional. The patient was then prepped and draped in the usual sterile fashion. Once an appropriate time out had been performed, with all parties  consenting, a 25 Samoan cystoscope with a 30-degree lens was placed through  the urethra into the bladder. The right ureteral orifice was identified and the stent removed to meatus, using a wire we cannulated with a 5 Samoan ureteral catheter. Contrast was injected and the right ureter and renal pelvis were identified, with a significant narrowing of the distal ureter, not much different from previous imaging. There was proximal dilation and tortuosity. At this time we attempted to replace a wire, and had difficulty regaining access through the stricture.   We had to perform a

## 2022-01-31 NOTE — ANESTHESIA PRE PROCEDURE
Abner Bond MD           Allergies:  No Known Allergies    Problem List:    Patient Active Problem List   Diagnosis Code    CAD (coronary artery disease) I25.10    Hypertension I10    Hyperlipidemia E78.5    S/P CABG x 6, Z95.1    CAD (coronary artery disease) I25.10    Colon polyps K63.5       Past Medical History:        Diagnosis Date    Arthritis     ASHD (arteriosclerotic heart disease)     CAD (coronary artery disease)     Status post bypass    Colon polyps       colonoscopy  tubular  adenoma   rinesmith    Decreased sense of smell 2015    Epistaxis 2015    History of blood transfusion 2005    ? ???? with by pass? ??    Hyperlipidemia     Hypertension     Kidney stones     Mild carotid artery disease ([facility] [address]     Nasal obstruction 2015    Nasal septal perforation 2015       Past Surgical History:        Procedure Laterality Date    BLADDER SURGERY Right 2021    CYSTO, URETEROSCOPY, RIGHT RETROGRADE PYELOGRAM, URETERAL DILATION, WITH URETERAL BX RIGHT URETERAL STENT PLACEMENT performed by Lyndon Foster MD at [address]  2005    6 by pass    COLONOSCOPY  2019    Dr Blanca Parents  colon polyps 2014    COLONOSCOPY Left 2019    COLONOSCOPY POLYPECTOMY SNARE/COLD BIOPSY performed by Latrice Bowers MD at [address]  2005    EXCISION OF AURAL MASS      NASAL POLYP SURGERY  2015    BIOPSY OF NASAL POLYP MIDDLE TURBINATE AND BIOPSY SEPTAL  [address] SKIN CANCER EXCISION  2018    on back    TONSILLECTOMY      as a child       Social History:    Social History     Tobacco Use    Smoking status: Former Smoker     Packs/day: 1.50     Years: 30.00     Pack years: 45.00     Types: Cigarettes     Quit date: 6/3/2002     Years since quittin.6    Smokeless tobacco: Never Used   Substance Use Topics    Alcohol use: Yes     Comment: seldom Counseling given: Not Answered      Vital Signs (Current): There were no vitals filed for this visit. BP Readings from Last 3 Encounters:   01/31/22 (!) 188/85   12/27/21 (!) 144/86   12/24/21 (!) 167/78       NPO Status:                                                                                 BMI:   Wt Readings from Last 3 Encounters:   01/31/22 236 lb 6.4 oz (107.2 kg)   12/27/21 246 lb (111.6 kg)   12/24/21 235 lb (106.6 kg)     There is no height or weight on file to calculate BMI.    CBC:   Lab Results   Component Value Date    WBC 10.2 12/24/2021    RBC 4.19 12/24/2021    RBC 4.44 06/09/2020    RBC 3 06/09/2020    HGB 13.4 12/24/2021    HCT 41.9 12/24/2021    .0 12/24/2021    RDW 13.8 06/09/2020     12/24/2021       CMP:   Lab Results   Component Value Date     12/24/2021    K 4.8 12/24/2021     12/24/2021    CO2 24 12/24/2021    BUN 18 12/24/2021    CREATININE 0.9 12/24/2021    LABGLOM 83 12/24/2021    GLUCOSE 101 12/24/2021    GLUCOSE 97 06/09/2020    PROT 7.1 12/24/2021    CALCIUM 9.1 12/24/2021    BILITOT 0.2 12/24/2021    BILITOT Negative 06/09/2020    ALKPHOS 123 12/24/2021    AST 21 12/24/2021    ALT 21 12/24/2021       POC Tests: No results for input(s): POCGLU, POCNA, POCK, POCCL, POCBUN, POCHEMO, POCHCT in the last 72 hours.     Coags:   Lab Results   Component Value Date    INR 1.09 06/03/2015    APTT 25.7 06/03/2015       HCG (If Applicable): No results found for: PREGTESTUR, PREGSERUM, HCG, HCGQUANT     ABGs: No results found for: PHART, PO2ART, UMA8DOF, ECM9NYW, BEART, M1AVGVCN     Type & Screen (If Applicable):  No results found for: LABABO, LABRH    Drug/Infectious Status (If Applicable):  No results found for: HIV, HEPCAB    COVID-19 Screening (If Applicable): No results found for: COVID19        Anesthesia Evaluation  Patient summary reviewed no history of anesthetic complications:   Airway: Mallampati: II  TM distance: >3 FB   Neck ROM: full  Mouth opening: > = 3 FB Dental: normal exam         Pulmonary:normal exam                              ROS comment: Former smoker   Cardiovascular:    (+) hypertension:, CAD:, CABG/stent:, hyperlipidemia                  Neuro/Psych:               GI/Hepatic/Renal:             Endo/Other:                     Abdominal:   (+) obese,           Vascular: Other Findings:               Anesthesia Plan      MAC     ASA 3       Induction: intravenous. MIPS: prophylactic pharmacologic antiemetic agents not administered perioperatively for documented reasons. Anesthetic plan and risks discussed with patient and spouse.       Plan discussed with CRNA and surgical team.                  Karen López MD   1/31/2022

## 2022-02-21 DIAGNOSIS — E55.9 VITAMIN D DEFICIENCY: ICD-10-CM

## 2022-02-21 RX ORDER — ERGOCALCIFEROL 1.25 MG/1
CAPSULE ORAL
Qty: 12 CAPSULE | Refills: 3 | Status: SHIPPED | OUTPATIENT
Start: 2022-02-21

## 2022-02-28 ENCOUNTER — OFFICE VISIT (OUTPATIENT)
Dept: UROLOGY | Age: 72
End: 2022-02-28
Payer: MEDICARE

## 2022-02-28 VITALS
HEIGHT: 71 IN | BODY MASS INDEX: 33.74 KG/M2 | SYSTOLIC BLOOD PRESSURE: 144 MMHG | DIASTOLIC BLOOD PRESSURE: 78 MMHG | WEIGHT: 241 LBS

## 2022-02-28 DIAGNOSIS — N13.8 BPH WITH OBSTRUCTION/LOWER URINARY TRACT SYMPTOMS: ICD-10-CM

## 2022-02-28 DIAGNOSIS — N13.5 URETER, STRICTURE: Primary | ICD-10-CM

## 2022-02-28 DIAGNOSIS — N40.1 BPH WITH OBSTRUCTION/LOWER URINARY TRACT SYMPTOMS: ICD-10-CM

## 2022-02-28 PROCEDURE — 99214 OFFICE O/P EST MOD 30 MIN: CPT | Performed by: UROLOGY

## 2022-02-28 NOTE — PROGRESS NOTES
MD MD [facility] Consultation / New Patient Visit    Patient:  Terence Riojas  YOB: 1950  Date: 2/28/2022  Consult requested from Barbara Silva MD     HISTORY OF PRESENT ILLNESS:   The patient is a 66 y.o. male who presents today for follow-up for the following problem(s): Right ureteral stricture  Overall the problem(s) : are worsening. Associated Symptoms: No dysuria, gross hematuria. Pain Severity:      Today visit:   2/28/22   Presents with history of right hydronephrosis, was evaluated with ureteroscopy and biopsy of dense stricture. The stricture has recurred. We discuss ureteral reimplant. Path:  FINAL DIAGNOSIS:   Right ureter, biopsies:   Amorphous eosinophilic fibrinous debris and rare benign-appearing epithelial cells. Nondiagnostic specimen. Summary of old records:   (Patient's old records, notes and chart reviewed and summarized above.)    Last several PSA's:  Lab Results   Component Value Date    PSA 5.11 (H) 12/17/2021    PSA 3.89 (H) 07/10/2019    PSA 4.18 (H) 05/17/2018       Last total testosterone:  No results found for: TESTOSTERONE    Urinalysis today:  No results found for this visit on 02/28/22. Last BUN and creatinine:  Lab Results   Component Value Date    BUN 18 12/24/2021     Lab Results   Component Value Date    CREATININE 0.9 12/24/2021       Imaging Reviewed during this Office Visit:   (results were independently reviewed by physician and radiology report verified)    PAST MEDICAL, FAMILY AND SOCIAL HISTORY:  Past Medical History:   Diagnosis Date    Arthritis     ASHD (arteriosclerotic heart disease)     CAD (coronary artery disease)     Status post bypass    Colon polyps 2009      colonoscopy  tubular  adenoma   [facility]    Decreased sense of smell 5/22/2015    Epistaxis 5/22/2015    History of blood transfusion 2005    ? ???? with by pass? ??    Hyperlipidemia     Hypertension     Kidney stones     Mild carotid artery disease ([facility] [address] 2013    Nasal obstruction 5/22/2015    Nasal septal perforation 5/22/2015     Past Surgical History:   Procedure Laterality Date    BLADDER SURGERY Right 12/20/2021    CYSTO, URETEROSCOPY, RIGHT RETROGRADE PYELOGRAM, URETERAL DILATION, WITH URETERAL BX RIGHT URETERAL STENT PLACEMENT [facility] 1/31/2022    CYSTOSCOPY RIGHT RETROGRADE PYELOGRAM, RIGHT STENT EXCHANGE performed by Shayy Hale MD at [address]  2005    6 by pass    COLONOSCOPY  12/2019    Dr Kay Samuels  colon polyps 8//2014 9-2019    COLONOSCOPY Left 09/13/2019    COLONOSCOPY POLYPECTOMY SNARE/COLD BIOPSY performed by Barbara Gaston MD at [address]  11/2005    EXCISION OF AURAL MASS      NASAL POLYP SURGERY  06/05/2015    BIOPSY OF NASAL POLYP MIDDLE TURBINATE AND BIOPSY SEPTAL [facility]    SKIN CANCER EXCISION  2018    on back    TONSILLECTOMY      as a child     Family History   Problem Relation Age of Onset    Heart Disease Mother     Cancer Father         throat    Diabetes Sister     Asthma Brother      Outpatient Medications Marked as Taking for the 2/28/22 encounter (Office Visit) with Shayy Hale MD   Medication Sig Dispense Refill    vitamin D (ERGOCALCIFEROL) 1.25 MG (09976 UT) CAPS capsule TAKE 1 CAPSULE ONCE A WEEK 12 capsule 3    losartan (COZAAR) 100 MG tablet Take 1 tablet by mouth daily 90 tablet 1    metoprolol tartrate (LOPRESSOR) 25 MG tablet TAKE 1 TABLET TWICE A DAY 14 tablet 0    atorvastatin (LIPITOR) 40 MG tablet TAKE 1 TABLET DAILY 90 tablet 3    aspirin 81 MG EC tablet Take 81 mg by mouth 2 times daily       acetaminophen (TYLENOL ARTHRITIS PAIN) 650 MG extended release tablet Take 650 mg by mouth every 8 hours as needed for Pain      sodium chloride (OCEAN, BABY AYR) 0.65 % nasal spray 1 spray by Nasal route as needed

## 2022-03-01 ENCOUNTER — TELEPHONE (OUTPATIENT)
Dept: UROLOGY | Age: 72
End: 2022-03-01

## 2022-03-01 DIAGNOSIS — N13.5 URETER, STRICTURE: Primary | ICD-10-CM

## 2022-03-01 DIAGNOSIS — Z01.818 PRE-OP TESTING: ICD-10-CM

## 2022-03-01 NOTE — TELEPHONE ENCOUNTER
[facility]rology  [facility]  [address] [place] [address], [address] [place], [place] [address] Drive  [phone]    START BOWEL PREP ON 4/3/2022    Surgery Bowel Preparation    Purchase a 10 ounce bottle of Magnesium Citrate at your pharmacy and drink the afternoon before your scheduled surgery. Start drinking approximately 2:00pm.  Suggest drinking it chilled with Sprite or Ginger Ale. Start a clear liquid diet (for dinner) the evening before surgery. You may have the following:   Water   Apple, grape or cranberry juice   Clear, fat free broth   Clear sodas (7-up, Sprite)   Plain gelatin (Jell-o)   Popsicles without bits of fruit or fruit pulp   Tea or coffee without milk or cream    Nothing to eat or drink after midnight before your scheduled surgery. Do a fleets enema the night prior to your surgery between 7:00pm and 8:00pm    Please contact our office at [phone] if you have any questions.

## 2022-03-01 NOTE — TELEPHONE ENCOUNTER
Patient is scheduled on 4/4/22 with Dr. Linda Mays. Surgery consent to be done upon arrival.  Patient to do urine culture and fasting labs on 3/21/22. Surgery instructions mailed to patient.

## 2022-03-01 NOTE — TELEPHONE ENCOUNTER
DO NOT TAKE ASPIRIN,  FISH OIL, COUMADIN, IBUPROFEN, MOTRIN-LIKE DRUGS AND ANY MULTIVITAMINS OR OVER THE COUNTER SUPPLEMENTS 14 DAYS PRIOR TO SURGERY. Francheska Massey 1950 Diagnosis:     Surgical Physician: Dr. Campo Most have been scheduled for the procedure marked below:      Surgery: Cystoscopy, Right Stent Exchange with Robotic Right Ureteral Reimplant with possible Boari Flap         Date: 4/4/2022     Anesthesia: Anesthesiologist (General/Spinal)     Place of Service: [address] [place] [address] Second Floor Same Day Surgery         Arrive to same day surgery by:  10:00 am  (Surgery time is subject to change)      INSTRUCTIONS AS MARKED BELOW:    1.  DO NOT eat or drink anything after midnight before surgery. 2.  We prefer you shower or bathe with an antibacterial soap (Dial) the morning of surgery. 3.  Please plan to stay at the hospital the night of procedure. 4.  Please bring a current medication list, photo ID and insurance card(s) with you  5. Okay to take Tylenol  6. If you take Glucophage, Metformin or Janumet, hold 48-hours prior to surgery  7. Take blood pressure or heart medication as directed, if taken in the morning take with a small sip of water  8. Jass Alford PA-C may assist with your Robotic surgery  9. The office will call you in 1-2 days after your procedure to schedule a follow up. DATE SENSITIVE TESTING-DO ON THE DATE LISTED*WALK IN *NO APPOINTMENT    DO URINE CULTURE AND FASTING LABS ON 3/21/22. ORDERS INCLUDED.         Date: 3/1/2022

## 2022-03-01 NOTE — TELEPHONE ENCOUNTER
Robotic Surgery Scheduling Form   [facility] [address]    Phone * [phone]   Surgical Scheduling Direct line Phone * [phone]  Fax * [phone]      Tony Puente      1950    male    Annabel Lubin 54 [address]   Marital Status:         Home Phone: [phone]   Cell Phone:   Telephone Information:   Mobile [phone]              Surgeon: Dr. Alesia Pacheco Surgery Date:4/4/2022 Time: 12:00 pm     Procedure: Cystoscopy, Right Stent Exchange with Robotic Right Ureteral Reimplant with Possible Boari Flap  Outpatient OBS    Diagnosis: Ureteral Stricture    Important Medical History: IN EPIC    Special Inst/Equip: XI    CPT Codes: [id number]    Latex Allergy:   No Cardiac Device:  No    Case Location:  Main OR     Preadmission Testing:  Phone Call    PAT Date and Time: ________________________________    PAT Confirmation #: _________________________________    Post Op Visit:  ______________________________________    Need Preop Cardiac Clearance:   No    Does patient have Cardiologist/physician?  none    Surgery Conformation #:  ______________________________________________    : __________________________________ Date:____________________    Firefly:  No    Dual Console:  No   Ultrasound:  No    Single Site: No    RNFA (colon resection only):  Assisting Surgeon: Rhea Mcoky, [address] [place] [address] Street Name:  [address]

## 2022-03-04 DIAGNOSIS — I25.10 CORONARY ARTERY DISEASE INVOLVING NATIVE CORONARY ARTERY OF NATIVE HEART WITHOUT ANGINA PECTORIS: ICD-10-CM

## 2022-03-04 DIAGNOSIS — E78.5 HYPERLIPIDEMIA, UNSPECIFIED HYPERLIPIDEMIA TYPE: ICD-10-CM

## 2022-03-04 DIAGNOSIS — I10 ESSENTIAL HYPERTENSION: ICD-10-CM

## 2022-03-05 RX ORDER — ATORVASTATIN CALCIUM 40 MG/1
TABLET, FILM COATED ORAL
Qty: 90 TABLET | Refills: 3 | Status: SHIPPED | OUTPATIENT
Start: 2022-03-05

## 2022-03-07 ENCOUNTER — PREP FOR PROCEDURE (OUTPATIENT)
Dept: UROLOGY | Age: 72
End: 2022-03-07

## 2022-03-07 RX ORDER — SODIUM CHLORIDE 9 MG/ML
INJECTION, SOLUTION INTRAVENOUS CONTINUOUS
Status: CANCELLED | OUTPATIENT
Start: 2022-04-04

## 2022-03-21 ENCOUNTER — HOSPITAL ENCOUNTER (OUTPATIENT)
Age: 72
Discharge: HOME | End: 2022-03-21
Payer: MEDICARE

## 2022-03-21 DIAGNOSIS — Z01.818 PRE-OP TESTING: ICD-10-CM

## 2022-03-21 DIAGNOSIS — N13.5 URETER, STRICTURE: ICD-10-CM

## 2022-03-21 LAB
ANION GAP SERPL CALCULATED.3IONS-SCNC: 13 MEQ/L (ref 8–16)
BASOPHILS # BLD: 0.4 %
BASOPHILS ABSOLUTE: 0 THOU/MM3 (ref 0–0.1)
BUN BLDV-MCNC: 20 MG/DL (ref 7–22)
CALCIUM SERPL-MCNC: 9.3 MG/DL (ref 8.5–10.5)
CHLORIDE BLD-SCNC: 105 MEQ/L (ref 98–111)
CO2: 20 MEQ/L (ref 23–33)
CREAT SERPL-MCNC: 0.9 MG/DL (ref 0.4–1.2)
EOSINOPHIL # BLD: 4.1 %
EOSINOPHILS ABSOLUTE: 0.3 THOU/MM3 (ref 0–0.4)
ERYTHROCYTE [DISTWIDTH] IN BLOOD BY AUTOMATED COUNT: 13.6 % (ref 11.5–14.5)
ERYTHROCYTE [DISTWIDTH] IN BLOOD BY AUTOMATED COUNT: 48.7 FL (ref 35–45)
GFR SERPL CREATININE-BSD FRML MDRD: 83 ML/MIN/1.73M2
GLUCOSE BLD-MCNC: 100 MG/DL (ref 70–108)
HCT VFR BLD CALC: 42.2 % (ref 42–52)
HEMOGLOBIN: 13.9 GM/DL (ref 14–18)
IMMATURE GRANS (ABS): 0.02 THOU/MM3 (ref 0–0.07)
IMMATURE GRANULOCYTES: 0.3 %
LYMPHOCYTES # BLD: 33.1 %
LYMPHOCYTES ABSOLUTE: 2.6 THOU/MM3 (ref 1–4.8)
MCH RBC QN AUTO: 32.2 PG (ref 26–33)
MCHC RBC AUTO-ENTMCNC: 32.9 GM/DL (ref 32.2–35.5)
MCV RBC AUTO: 97.7 FL (ref 80–94)
MONOCYTES # BLD: 10.8 %
MONOCYTES ABSOLUTE: 0.9 THOU/MM3 (ref 0.4–1.3)
NUCLEATED RED BLOOD CELLS: 0 /100 WBC
PLATELET # BLD: 182 THOU/MM3 (ref 130–400)
PMV BLD AUTO: 10 FL (ref 9.4–12.4)
POTASSIUM SERPL-SCNC: 4.2 MEQ/L (ref 3.5–5.2)
RBC # BLD: 4.32 MILL/MM3 (ref 4.7–6.1)
SEG NEUTROPHILS: 51.3 %
SEGMENTED NEUTROPHILS ABSOLUTE COUNT: 4.1 THOU/MM3 (ref 1.8–7.7)
SODIUM BLD-SCNC: 138 MEQ/L (ref 135–145)
WBC # BLD: 8 THOU/MM3 (ref 4.8–10.8)

## 2022-03-21 PROCEDURE — 85025 COMPLETE CBC W/AUTO DIFF WBC: CPT

## 2022-03-21 PROCEDURE — 80048 BASIC METABOLIC PNL TOTAL CA: CPT

## 2022-03-21 PROCEDURE — 87186 SC STD MICRODIL/AGAR DIL: CPT

## 2022-03-21 PROCEDURE — 87086 URINE CULTURE/COLONY COUNT: CPT

## 2022-03-21 PROCEDURE — 36415 COLL VENOUS BLD VENIPUNCTURE: CPT

## 2022-03-21 PROCEDURE — 87077 CULTURE AEROBIC IDENTIFY: CPT

## 2022-03-23 ENCOUNTER — TELEPHONE (OUTPATIENT)
Dept: UROLOGY | Age: 72
End: 2022-03-23

## 2022-03-23 NOTE — TELEPHONE ENCOUNTER
Please review urine culture on 3/21/22. Surgery with Bobo on 4/4/22 for a Cysto, urethral lift. Thanks.

## 2022-03-24 LAB
ORGANISM: ABNORMAL
URINE CULTURE, ROUTINE: ABNORMAL

## 2022-03-24 RX ORDER — SULFAMETHOXAZOLE AND TRIMETHOPRIM 800; 160 MG/1; MG/1
1 TABLET ORAL 2 TIMES DAILY
Qty: 28 TABLET | Refills: 0 | Status: SHIPPED | OUTPATIENT
Start: 2022-03-24 | End: 2022-04-07

## 2022-03-24 NOTE — TELEPHONE ENCOUNTER
Attempted to call the patient voicemail left for patient stating prescription was sent to the pharmacy. Message also sent to patient via my chart.

## 2022-03-25 NOTE — PROGRESS NOTES
Weight: 235 lb (106.6 kg) Weight Method: Stated Following instructions given to patient, who states understanding:    NPO after midnight  [employer] and 's license  Wear comfortable clean clothing  Do not bring jewelry   Shower night before and morning of surgery with a liquid antibacterial soap  Bring medications in original bottles  Follow all instructions given by your physician   needed at discharge  Call [phone] for any questions  Report to [facility] on 2nd floor  If you would become ill prior to surgery, please call the surgeon  May have a visitor with you, we request that you limit to 2 visitors in pre-op area  Please bring and wear mask

## 2022-04-04 ENCOUNTER — APPOINTMENT (OUTPATIENT)
Dept: GENERAL RADIOLOGY | Age: 72
End: 2022-04-04
Attending: UROLOGY
Payer: MEDICARE

## 2022-04-04 ENCOUNTER — HOSPITAL ENCOUNTER (OUTPATIENT)
Age: 72
Discharge: HOME | End: 2022-04-05
Attending: UROLOGY | Admitting: UROLOGY
Payer: MEDICARE

## 2022-04-04 ENCOUNTER — ANESTHESIA EVENT (OUTPATIENT)
Dept: OPERATING ROOM | Age: 72
End: 2022-04-04
Payer: MEDICARE

## 2022-04-04 ENCOUNTER — ANESTHESIA (OUTPATIENT)
Dept: OPERATING ROOM | Age: 72
End: 2022-04-04
Payer: MEDICARE

## 2022-04-04 VITALS — DIASTOLIC BLOOD PRESSURE: 63 MMHG | SYSTOLIC BLOOD PRESSURE: 143 MMHG | OXYGEN SATURATION: 100 % | TEMPERATURE: 96.3 F

## 2022-04-04 DIAGNOSIS — N13.5 URETERAL STRICTURE: ICD-10-CM

## 2022-04-04 DIAGNOSIS — G89.18 POST-OPERATIVE PAIN: Primary | ICD-10-CM

## 2022-04-04 LAB
ANION GAP SERPL CALCULATED.3IONS-SCNC: 12 MEQ/L (ref 8–16)
BASOPHILS # BLD: 0.2 %
BASOPHILS ABSOLUTE: 0 THOU/MM3 (ref 0–0.1)
BUN BLDV-MCNC: 18 MG/DL (ref 7–22)
CALCIUM SERPL-MCNC: 8.6 MG/DL (ref 8.5–10.5)
CHLORIDE BLD-SCNC: 107 MEQ/L (ref 98–111)
CO2: 18 MEQ/L (ref 23–33)
CREAT SERPL-MCNC: 1 MG/DL (ref 0.4–1.2)
EOSINOPHIL # BLD: 0.1 %
EOSINOPHILS ABSOLUTE: 0 THOU/MM3 (ref 0–0.4)
ERYTHROCYTE [DISTWIDTH] IN BLOOD BY AUTOMATED COUNT: 13.2 % (ref 11.5–14.5)
ERYTHROCYTE [DISTWIDTH] IN BLOOD BY AUTOMATED COUNT: 48.9 FL (ref 35–45)
GFR SERPL CREATININE-BSD FRML MDRD: 73 ML/MIN/1.73M2
GLUCOSE BLD-MCNC: 121 MG/DL (ref 70–108)
HCT VFR BLD CALC: 42.7 % (ref 42–52)
HEMOGLOBIN: 13.4 GM/DL (ref 14–18)
IMMATURE GRANS (ABS): 0.1 THOU/MM3 (ref 0–0.07)
IMMATURE GRANULOCYTES: 0.7 %
LYMPHOCYTES # BLD: 12.8 %
LYMPHOCYTES ABSOLUTE: 1.8 THOU/MM3 (ref 1–4.8)
MCH RBC QN AUTO: 31.8 PG (ref 26–33)
MCHC RBC AUTO-ENTMCNC: 31.4 GM/DL (ref 32.2–35.5)
MCV RBC AUTO: 101.2 FL (ref 80–94)
MONOCYTES # BLD: 5.9 %
MONOCYTES ABSOLUTE: 0.8 THOU/MM3 (ref 0.4–1.3)
NUCLEATED RED BLOOD CELLS: 0 /100 WBC
PLATELET # BLD: 189 THOU/MM3 (ref 130–400)
PMV BLD AUTO: 9.8 FL (ref 9.4–12.4)
POTASSIUM SERPL-SCNC: 5.1 MEQ/L (ref 3.5–5.2)
RBC # BLD: 4.22 MILL/MM3 (ref 4.7–6.1)
SEG NEUTROPHILS: 80.3 %
SEGMENTED NEUTROPHILS ABSOLUTE COUNT: 11.3 THOU/MM3 (ref 1.8–7.7)
SODIUM BLD-SCNC: 137 MEQ/L (ref 135–145)
WBC # BLD: 14.1 THOU/MM3 (ref 4.8–10.8)

## 2022-04-04 PROCEDURE — 80048 BASIC METABOLIC PNL TOTAL CA: CPT

## 2022-04-04 PROCEDURE — 88331 PATH CONSLTJ SURG 1 BLK 1SPC: CPT

## 2022-04-04 PROCEDURE — C2617 STENT, NON-COR, TEM W/O DEL: HCPCS | Performed by: UROLOGY

## 2022-04-04 PROCEDURE — C1758 CATHETER, URETERAL: HCPCS | Performed by: UROLOGY

## 2022-04-04 PROCEDURE — 3600000003 HC SURGERY LEVEL 3 BASE: Performed by: UROLOGY

## 2022-04-04 PROCEDURE — 3600000013 HC SURGERY LEVEL 3 ADDTL 15MIN: Performed by: UROLOGY

## 2022-04-04 PROCEDURE — 6360000002 HC RX W HCPCS: Performed by: ANESTHESIOLOGY

## 2022-04-04 PROCEDURE — 7100000000 HC PACU RECOVERY - FIRST 15 MIN: Performed by: UROLOGY

## 2022-04-04 PROCEDURE — 6360000002 HC RX W HCPCS: Performed by: UROLOGY

## 2022-04-04 PROCEDURE — 3700000001 HC ADD 15 MINUTES (ANESTHESIA): Performed by: UROLOGY

## 2022-04-04 PROCEDURE — 2709999900 HC NON-CHARGEABLE SUPPLY: Performed by: UROLOGY

## 2022-04-04 PROCEDURE — 85025 COMPLETE CBC W/AUTO DIFF WBC: CPT

## 2022-04-04 PROCEDURE — 50947 LAPARO NEW URETER/BLADDER: CPT | Performed by: PHYSICIAN ASSISTANT

## 2022-04-04 PROCEDURE — 6370000000 HC RX 637 (ALT 250 FOR IP): Performed by: PHYSICIAN ASSISTANT

## 2022-04-04 PROCEDURE — 6360000002 HC RX W HCPCS: Performed by: NURSE ANESTHETIST, CERTIFIED REGISTERED

## 2022-04-04 PROCEDURE — 7100000001 HC PACU RECOVERY - ADDTL 15 MIN: Performed by: UROLOGY

## 2022-04-04 PROCEDURE — 2500000003 HC RX 250 WO HCPCS: Performed by: NURSE ANESTHETIST, CERTIFIED REGISTERED

## 2022-04-04 PROCEDURE — 36415 COLL VENOUS BLD VENIPUNCTURE: CPT

## 2022-04-04 PROCEDURE — 2500000003 HC RX 250 WO HCPCS: Performed by: PHYSICIAN ASSISTANT

## 2022-04-04 PROCEDURE — 2580000003 HC RX 258: Performed by: UROLOGY

## 2022-04-04 PROCEDURE — 2580000003 HC RX 258: Performed by: PHYSICIAN ASSISTANT

## 2022-04-04 PROCEDURE — 2500000003 HC RX 250 WO HCPCS

## 2022-04-04 PROCEDURE — 6360000002 HC RX W HCPCS: Performed by: PHYSICIAN ASSISTANT

## 2022-04-04 PROCEDURE — 3700000000 HC ANESTHESIA ATTENDED CARE: Performed by: UROLOGY

## 2022-04-04 PROCEDURE — C1769 GUIDE WIRE: HCPCS | Performed by: UROLOGY

## 2022-04-04 PROCEDURE — 7100000011 HC PHASE II RECOVERY - ADDTL 15 MIN: Performed by: UROLOGY

## 2022-04-04 PROCEDURE — 88305 TISSUE EXAM BY PATHOLOGIST: CPT

## 2022-04-04 PROCEDURE — 3209999900 FLUORO FOR SURGICAL PROCEDURES

## 2022-04-04 PROCEDURE — 7100000010 HC PHASE II RECOVERY - FIRST 15 MIN: Performed by: UROLOGY

## 2022-04-04 PROCEDURE — 6360000004 HC RX CONTRAST MEDICATION: Performed by: UROLOGY

## 2022-04-04 PROCEDURE — 2500000003 HC RX 250 WO HCPCS: Performed by: ANESTHESIOLOGY

## 2022-04-04 PROCEDURE — 2720000010 HC SURG SUPPLY STERILE: Performed by: UROLOGY

## 2022-04-04 PROCEDURE — 2500000003 HC RX 250 WO HCPCS: Performed by: UROLOGY

## 2022-04-04 DEVICE — URETERAL STENT WITH SIDE HOLES 6FX28CM
Type: IMPLANTABLE DEVICE | Site: URETER | Status: FUNCTIONAL
Brand: TRIA™ FIRM

## 2022-04-04 RX ORDER — MORPHINE SULFATE 2 MG/ML
2 INJECTION, SOLUTION INTRAMUSCULAR; INTRAVENOUS
Status: DISCONTINUED | OUTPATIENT
Start: 2022-04-04 | End: 2022-04-05 | Stop reason: HOSPADM

## 2022-04-04 RX ORDER — FENTANYL CITRATE 50 UG/ML
50 INJECTION, SOLUTION INTRAMUSCULAR; INTRAVENOUS EVERY 5 MIN PRN
Status: DISCONTINUED | OUTPATIENT
Start: 2022-04-04 | End: 2022-04-04 | Stop reason: HOSPADM

## 2022-04-04 RX ORDER — ATORVASTATIN CALCIUM 40 MG/1
40 TABLET, FILM COATED ORAL NIGHTLY
Status: DISCONTINUED | OUTPATIENT
Start: 2022-04-05 | End: 2022-04-05 | Stop reason: HOSPADM

## 2022-04-04 RX ORDER — SODIUM CHLORIDE 0.9 % (FLUSH) 0.9 %
5-40 SYRINGE (ML) INJECTION EVERY 12 HOURS SCHEDULED
Status: DISCONTINUED | OUTPATIENT
Start: 2022-04-04 | End: 2022-04-04 | Stop reason: HOSPADM

## 2022-04-04 RX ORDER — MIDAZOLAM HYDROCHLORIDE 1 MG/ML
INJECTION INTRAMUSCULAR; INTRAVENOUS PRN
Status: DISCONTINUED | OUTPATIENT
Start: 2022-04-04 | End: 2022-04-04 | Stop reason: SDUPTHER

## 2022-04-04 RX ORDER — SODIUM CHLORIDE 9 MG/ML
INJECTION, SOLUTION INTRAVENOUS CONTINUOUS
Status: DISCONTINUED | OUTPATIENT
Start: 2022-04-04 | End: 2022-04-05

## 2022-04-04 RX ORDER — GLYCOPYRROLATE 1 MG/5 ML
SYRINGE (ML) INTRAVENOUS PRN
Status: DISCONTINUED | OUTPATIENT
Start: 2022-04-04 | End: 2022-04-04 | Stop reason: SDUPTHER

## 2022-04-04 RX ORDER — SULFAMETHOXAZOLE AND TRIMETHOPRIM 800; 160 MG/1; MG/1
1 TABLET ORAL 2 TIMES DAILY
Status: DISCONTINUED | OUTPATIENT
Start: 2022-04-04 | End: 2022-04-05 | Stop reason: HOSPADM

## 2022-04-04 RX ORDER — HYDROCODONE BITARTRATE AND ACETAMINOPHEN 5; 325 MG/1; MG/1
1 TABLET ORAL EVERY 4 HOURS PRN
Status: DISCONTINUED | OUTPATIENT
Start: 2022-04-04 | End: 2022-04-05 | Stop reason: HOSPADM

## 2022-04-04 RX ORDER — HYDROCODONE BITARTRATE AND ACETAMINOPHEN 5; 325 MG/1; MG/1
2 TABLET ORAL EVERY 4 HOURS PRN
Status: DISCONTINUED | OUTPATIENT
Start: 2022-04-04 | End: 2022-04-05 | Stop reason: HOSPADM

## 2022-04-04 RX ORDER — ACETAMINOPHEN 325 MG/1
650 TABLET ORAL EVERY 6 HOURS
Status: DISCONTINUED | OUTPATIENT
Start: 2022-04-04 | End: 2022-04-05 | Stop reason: HOSPADM

## 2022-04-04 RX ORDER — SODIUM CHLORIDE 0.9 % (FLUSH) 0.9 %
5-40 SYRINGE (ML) INJECTION EVERY 12 HOURS SCHEDULED
Status: DISCONTINUED | OUTPATIENT
Start: 2022-04-04 | End: 2022-04-05 | Stop reason: HOSPADM

## 2022-04-04 RX ORDER — MORPHINE SULFATE 2 MG/ML
2 INJECTION, SOLUTION INTRAMUSCULAR; INTRAVENOUS EVERY 5 MIN PRN
Status: DISCONTINUED | OUTPATIENT
Start: 2022-04-04 | End: 2022-04-04 | Stop reason: HOSPADM

## 2022-04-04 RX ORDER — LOSARTAN POTASSIUM 100 MG/1
100 TABLET ORAL DAILY
Status: DISCONTINUED | OUTPATIENT
Start: 2022-04-04 | End: 2022-04-05 | Stop reason: HOSPADM

## 2022-04-04 RX ORDER — ONDANSETRON 2 MG/ML
4 INJECTION INTRAMUSCULAR; INTRAVENOUS EVERY 6 HOURS PRN
Status: DISCONTINUED | OUTPATIENT
Start: 2022-04-04 | End: 2022-04-05 | Stop reason: HOSPADM

## 2022-04-04 RX ORDER — LABETALOL HYDROCHLORIDE 5 MG/ML
INJECTION, SOLUTION INTRAVENOUS PRN
Status: DISCONTINUED | OUTPATIENT
Start: 2022-04-04 | End: 2022-04-04 | Stop reason: SDUPTHER

## 2022-04-04 RX ORDER — SODIUM CHLORIDE 9 MG/ML
INJECTION, SOLUTION INTRAVENOUS CONTINUOUS
Status: DISCONTINUED | OUTPATIENT
Start: 2022-04-04 | End: 2022-04-04

## 2022-04-04 RX ORDER — SODIUM CHLORIDE 0.9 % (FLUSH) 0.9 %
5-40 SYRINGE (ML) INJECTION PRN
Status: DISCONTINUED | OUTPATIENT
Start: 2022-04-04 | End: 2022-04-05 | Stop reason: HOSPADM

## 2022-04-04 RX ORDER — LABETALOL 20 MG/4 ML (5 MG/ML) INTRAVENOUS SYRINGE
10
Status: DISCONTINUED | OUTPATIENT
Start: 2022-04-04 | End: 2022-04-04 | Stop reason: HOSPADM

## 2022-04-04 RX ORDER — NEOSTIGMINE METHYLSULFATE 5 MG/5 ML
SYRINGE (ML) INTRAVENOUS PRN
Status: DISCONTINUED | OUTPATIENT
Start: 2022-04-04 | End: 2022-04-04 | Stop reason: SDUPTHER

## 2022-04-04 RX ORDER — BUPIVACAINE HYDROCHLORIDE 5 MG/ML
INJECTION, SOLUTION PERINEURAL PRN
Status: DISCONTINUED | OUTPATIENT
Start: 2022-04-04 | End: 2022-04-04 | Stop reason: ALTCHOICE

## 2022-04-04 RX ORDER — HYDROMORPHONE HCL 110MG/55ML
PATIENT CONTROLLED ANALGESIA SYRINGE INTRAVENOUS PRN
Status: DISCONTINUED | OUTPATIENT
Start: 2022-04-04 | End: 2022-04-04 | Stop reason: SDUPTHER

## 2022-04-04 RX ORDER — PROPOFOL 10 MG/ML
INJECTION, EMULSION INTRAVENOUS PRN
Status: DISCONTINUED | OUTPATIENT
Start: 2022-04-04 | End: 2022-04-04 | Stop reason: SDUPTHER

## 2022-04-04 RX ORDER — SODIUM CHLORIDE 0.9 % (FLUSH) 0.9 %
5-40 SYRINGE (ML) INJECTION PRN
Status: DISCONTINUED | OUTPATIENT
Start: 2022-04-04 | End: 2022-04-04 | Stop reason: HOSPADM

## 2022-04-04 RX ORDER — SODIUM CHLORIDE 9 MG/ML
INJECTION, SOLUTION INTRAVENOUS PRN
Status: DISCONTINUED | OUTPATIENT
Start: 2022-04-04 | End: 2022-04-04 | Stop reason: HOSPADM

## 2022-04-04 RX ORDER — ONDANSETRON 4 MG/1
4 TABLET, ORALLY DISINTEGRATING ORAL EVERY 8 HOURS PRN
Status: DISCONTINUED | OUTPATIENT
Start: 2022-04-04 | End: 2022-04-05 | Stop reason: HOSPADM

## 2022-04-04 RX ORDER — BACITRACIN, NEOMYCIN, POLYMYXIN B 400; 3.5; 5 [USP'U]/G; MG/G; [USP'U]/G
OINTMENT TOPICAL 2 TIMES DAILY
Status: DISCONTINUED | OUTPATIENT
Start: 2022-04-04 | End: 2022-04-05 | Stop reason: HOSPADM

## 2022-04-04 RX ORDER — FENTANYL CITRATE 50 UG/ML
INJECTION, SOLUTION INTRAMUSCULAR; INTRAVENOUS PRN
Status: DISCONTINUED | OUTPATIENT
Start: 2022-04-04 | End: 2022-04-04 | Stop reason: SDUPTHER

## 2022-04-04 RX ORDER — ROCURONIUM BROMIDE 10 MG/ML
INJECTION, SOLUTION INTRAVENOUS PRN
Status: DISCONTINUED | OUTPATIENT
Start: 2022-04-04 | End: 2022-04-04 | Stop reason: SDUPTHER

## 2022-04-04 RX ORDER — SODIUM CHLORIDE 9 MG/ML
25 INJECTION, SOLUTION INTRAVENOUS PRN
Status: DISCONTINUED | OUTPATIENT
Start: 2022-04-04 | End: 2022-04-05 | Stop reason: HOSPADM

## 2022-04-04 RX ORDER — MORPHINE SULFATE 2 MG/ML
4 INJECTION, SOLUTION INTRAMUSCULAR; INTRAVENOUS
Status: DISCONTINUED | OUTPATIENT
Start: 2022-04-04 | End: 2022-04-05 | Stop reason: HOSPADM

## 2022-04-04 RX ORDER — SENNA AND DOCUSATE SODIUM 50; 8.6 MG/1; MG/1
1 TABLET, FILM COATED ORAL 2 TIMES DAILY
Status: DISCONTINUED | OUTPATIENT
Start: 2022-04-04 | End: 2022-04-05 | Stop reason: HOSPADM

## 2022-04-04 RX ORDER — TROSPIUM CHLORIDE 20 MG/1
20 TABLET, FILM COATED ORAL 2 TIMES DAILY PRN
Status: DISCONTINUED | OUTPATIENT
Start: 2022-04-04 | End: 2022-04-05

## 2022-04-04 RX ADMIN — ROCURONIUM BROMIDE 20 MG: 10 INJECTION, SOLUTION INTRAVENOUS at 14:07

## 2022-04-04 RX ADMIN — ROCURONIUM BROMIDE 50 MG: 10 INJECTION, SOLUTION INTRAVENOUS at 12:46

## 2022-04-04 RX ADMIN — BACITRACIN ZINC NEOMYCIN SULFATE POLYMYXIN B SULFATE: 400; 3.5; 5 OINTMENT TOPICAL at 21:28

## 2022-04-04 RX ADMIN — MIDAZOLAM 2 MG: 1 INJECTION INTRAMUSCULAR; INTRAVENOUS at 12:46

## 2022-04-04 RX ADMIN — SENNOSIDES AND DOCUSATE SODIUM 1 TABLET: 50; 8.6 TABLET ORAL at 21:28

## 2022-04-04 RX ADMIN — SODIUM CHLORIDE: 9 INJECTION, SOLUTION INTRAVENOUS at 14:09

## 2022-04-04 RX ADMIN — SODIUM CHLORIDE, PRESERVATIVE FREE 10 ML: 5 INJECTION INTRAVENOUS at 21:12

## 2022-04-04 RX ADMIN — CEFOXITIN SODIUM 1000 MG: 1 POWDER, FOR SOLUTION INTRAVENOUS at 21:29

## 2022-04-04 RX ADMIN — ACETAMINOPHEN 650 MG: 325 TABLET ORAL at 20:00

## 2022-04-04 RX ADMIN — PROPOFOL 50 MG: 10 INJECTION, EMULSION INTRAVENOUS at 13:57

## 2022-04-04 RX ADMIN — FENTANYL CITRATE 50 MCG: 50 INJECTION, SOLUTION INTRAMUSCULAR; INTRAVENOUS at 13:14

## 2022-04-04 RX ADMIN — HYDROCODONE BITARTRATE AND ACETAMINOPHEN 2 TABLET: 5; 325 TABLET ORAL at 20:03

## 2022-04-04 RX ADMIN — Medication 0.4 MG: at 16:10

## 2022-04-04 RX ADMIN — Medication 3 MG: at 16:10

## 2022-04-04 RX ADMIN — Medication 2000 MG: at 12:53

## 2022-04-04 RX ADMIN — SULFAMETHOXAZOLE AND TRIMETHOPRIM 1 TABLET: 800; 160 TABLET ORAL at 21:12

## 2022-04-04 RX ADMIN — LABETALOL HYDROCHLORIDE 5 MG: 5 INJECTION INTRAVENOUS at 16:14

## 2022-04-04 RX ADMIN — SODIUM CHLORIDE: 9 INJECTION, SOLUTION INTRAVENOUS at 20:00

## 2022-04-04 RX ADMIN — ROCURONIUM BROMIDE 20 MG: 10 INJECTION, SOLUTION INTRAVENOUS at 13:38

## 2022-04-04 RX ADMIN — LOSARTAN POTASSIUM 100 MG: 100 TABLET, FILM COATED ORAL at 21:12

## 2022-04-04 RX ADMIN — ROCURONIUM BROMIDE 30 MG: 10 INJECTION, SOLUTION INTRAVENOUS at 14:53

## 2022-04-04 RX ADMIN — HYDROMORPHONE HYDROCHLORIDE 0.4 MG: 2 INJECTION INTRAMUSCULAR; INTRAVENOUS; SUBCUTANEOUS at 16:22

## 2022-04-04 RX ADMIN — SODIUM CHLORIDE: 9 INJECTION, SOLUTION INTRAVENOUS at 11:10

## 2022-04-04 RX ADMIN — HYDROMORPHONE HYDROCHLORIDE 0.5 MG: 2 INJECTION INTRAMUSCULAR; INTRAVENOUS; SUBCUTANEOUS at 14:25

## 2022-04-04 RX ADMIN — METOPROLOL TARTRATE 25 MG: 25 TABLET, FILM COATED ORAL at 21:12

## 2022-04-04 RX ADMIN — PROPOFOL 150 MG: 10 INJECTION, EMULSION INTRAVENOUS at 12:46

## 2022-04-04 RX ADMIN — FENTANYL CITRATE 50 MCG: 50 INJECTION, SOLUTION INTRAMUSCULAR; INTRAVENOUS at 12:46

## 2022-04-04 RX ADMIN — HYDROMORPHONE HYDROCHLORIDE 0.5 MG: 2 INJECTION INTRAMUSCULAR; INTRAVENOUS; SUBCUTANEOUS at 13:55

## 2022-04-04 ASSESSMENT — PULMONARY FUNCTION TESTS
PIF_VALUE: 33
PIF_VALUE: 32
PIF_VALUE: 32
PIF_VALUE: 1
PIF_VALUE: 33
PIF_VALUE: 23
PIF_VALUE: 38
PIF_VALUE: 33
PIF_VALUE: 32
PIF_VALUE: 20
PIF_VALUE: 22
PIF_VALUE: 20
PIF_VALUE: 34
PIF_VALUE: 33
PIF_VALUE: 32
PIF_VALUE: 23
PIF_VALUE: 31
PIF_VALUE: 37
PIF_VALUE: 23
PIF_VALUE: 37
PIF_VALUE: 33
PIF_VALUE: 0
PIF_VALUE: 32
PIF_VALUE: 20
PIF_VALUE: 11
PIF_VALUE: 7
PIF_VALUE: 21
PIF_VALUE: 32
PIF_VALUE: 23
PIF_VALUE: 22
PIF_VALUE: 18
PIF_VALUE: 37
PIF_VALUE: 21
PIF_VALUE: 33
PIF_VALUE: 37
PIF_VALUE: 23
PIF_VALUE: 33
PIF_VALUE: 0
PIF_VALUE: 33
PIF_VALUE: 31
PIF_VALUE: 20
PIF_VALUE: 24
PIF_VALUE: 22
PIF_VALUE: 32
PIF_VALUE: 23
PIF_VALUE: 37
PIF_VALUE: 22
PIF_VALUE: 31
PIF_VALUE: 37
PIF_VALUE: 22
PIF_VALUE: 22
PIF_VALUE: 18
PIF_VALUE: 32
PIF_VALUE: 21
PIF_VALUE: 36
PIF_VALUE: 23
PIF_VALUE: 33
PIF_VALUE: 33
PIF_VALUE: 32
PIF_VALUE: 31
PIF_VALUE: 32
PIF_VALUE: 31
PIF_VALUE: 32
PIF_VALUE: 21
PIF_VALUE: 31
PIF_VALUE: 31
PIF_VALUE: 17
PIF_VALUE: 31
PIF_VALUE: 37
PIF_VALUE: 23
PIF_VALUE: 33
PIF_VALUE: 37
PIF_VALUE: 23
PIF_VALUE: 23
PIF_VALUE: 31
PIF_VALUE: 11
PIF_VALUE: 32
PIF_VALUE: 7
PIF_VALUE: 23
PIF_VALUE: 0
PIF_VALUE: 32
PIF_VALUE: 0
PIF_VALUE: 31
PIF_VALUE: 32
PIF_VALUE: 31
PIF_VALUE: 23
PIF_VALUE: 22
PIF_VALUE: 34
PIF_VALUE: 6
PIF_VALUE: 20
PIF_VALUE: 0
PIF_VALUE: 33
PIF_VALUE: 7
PIF_VALUE: 31
PIF_VALUE: 32
PIF_VALUE: 1
PIF_VALUE: 31
PIF_VALUE: 23
PIF_VALUE: 31
PIF_VALUE: 31
PIF_VALUE: 37
PIF_VALUE: 37
PIF_VALUE: 5
PIF_VALUE: 22
PIF_VALUE: 32
PIF_VALUE: 37
PIF_VALUE: 31
PIF_VALUE: 31
PIF_VALUE: 32
PIF_VALUE: 1
PIF_VALUE: 31
PIF_VALUE: 32
PIF_VALUE: 35
PIF_VALUE: 23
PIF_VALUE: 22
PIF_VALUE: 0
PIF_VALUE: 31
PIF_VALUE: 22
PIF_VALUE: 32
PIF_VALUE: 32
PIF_VALUE: 37
PIF_VALUE: 22
PIF_VALUE: 33
PIF_VALUE: 33
PIF_VALUE: 31
PIF_VALUE: 22
PIF_VALUE: 19
PIF_VALUE: 12
PIF_VALUE: 20
PIF_VALUE: 37
PIF_VALUE: 31
PIF_VALUE: 23
PIF_VALUE: 21
PIF_VALUE: 20
PIF_VALUE: 23
PIF_VALUE: 0
PIF_VALUE: 35
PIF_VALUE: 37
PIF_VALUE: 33
PIF_VALUE: 22
PIF_VALUE: 37
PIF_VALUE: 29
PIF_VALUE: 32
PIF_VALUE: 1
PIF_VALUE: 21
PIF_VALUE: 32
PIF_VALUE: 31
PIF_VALUE: 23
PIF_VALUE: 0
PIF_VALUE: 32
PIF_VALUE: 30
PIF_VALUE: 21
PIF_VALUE: 32
PIF_VALUE: 30
PIF_VALUE: 21
PIF_VALUE: 22
PIF_VALUE: 23
PIF_VALUE: 31
PIF_VALUE: 22
PIF_VALUE: 32
PIF_VALUE: 22
PIF_VALUE: 37
PIF_VALUE: 23
PIF_VALUE: 36
PIF_VALUE: 31
PIF_VALUE: 36
PIF_VALUE: 23
PIF_VALUE: 23
PIF_VALUE: 37
PIF_VALUE: 30
PIF_VALUE: 2
PIF_VALUE: 31
PIF_VALUE: 32
PIF_VALUE: 22
PIF_VALUE: 32
PIF_VALUE: 31
PIF_VALUE: 37
PIF_VALUE: 1
PIF_VALUE: 31
PIF_VALUE: 21
PIF_VALUE: 31
PIF_VALUE: 19
PIF_VALUE: 32
PIF_VALUE: 23
PIF_VALUE: 2
PIF_VALUE: 37
PIF_VALUE: 31
PIF_VALUE: 23
PIF_VALUE: 32
PIF_VALUE: 32
PIF_VALUE: 37
PIF_VALUE: 31
PIF_VALUE: 31
PIF_VALUE: 23
PIF_VALUE: 28
PIF_VALUE: 25
PIF_VALUE: 31
PIF_VALUE: 31
PIF_VALUE: 0
PIF_VALUE: 31
PIF_VALUE: 32
PIF_VALUE: 23
PIF_VALUE: 3
PIF_VALUE: 23
PIF_VALUE: 21
PIF_VALUE: 23
PIF_VALUE: 22
PIF_VALUE: 22
PIF_VALUE: 31
PIF_VALUE: 23
PIF_VALUE: 37
PIF_VALUE: 32
PIF_VALUE: 36
PIF_VALUE: 20
PIF_VALUE: 22
PIF_VALUE: 26
PIF_VALUE: 31
PIF_VALUE: 23
PIF_VALUE: 31
PIF_VALUE: 30
PIF_VALUE: 23
PIF_VALUE: 31
PIF_VALUE: 28
PIF_VALUE: 31
PIF_VALUE: 23
PIF_VALUE: 23
PIF_VALUE: 32
PIF_VALUE: 5

## 2022-04-04 ASSESSMENT — PAIN - FUNCTIONAL ASSESSMENT: PAIN_FUNCTIONAL_ASSESSMENT: 0-10

## 2022-04-04 ASSESSMENT — PAIN SCALES - GENERAL
PAINLEVEL_OUTOF10: 0
PAINLEVEL_OUTOF10: 7
PAINLEVEL_OUTOF10: 3
PAINLEVEL_OUTOF10: 0

## 2022-04-04 ASSESSMENT — PAIN DESCRIPTION - PAIN TYPE: TYPE: ACUTE PAIN;SURGICAL PAIN

## 2022-04-04 NOTE — BRIEF OP NOTE
Brief Postoperative Note      Patient: Yoli Beach  YOB: 1950  MRN: [id number]    Date of Procedure: 4/4/2022    Pre-Op Diagnosis: Ureteral stricture    Post-Op Diagnosis: Right Ureteral Stricture       Procedure(s):  CYSTOSCOPY, RIGHT STENT EXCHANGE, RIGHT RETROGRADE PYELOGRAM, ROBOTIC RIGHT URETERAL REIMPLANT    Surgeon(s):  Wilton Arellano MD    Assistant:  Physician Assistant: Nida Marie PA-C    Anesthesia: General    Estimated Blood Loss (mL): 50 mL    Complications: None    Specimens:   ID Type Source Tests Collected by Time Destination   A : DISTAL RIGHT URETERAL MARGIN Tissue Ureter SURGICAL PATHOLOGY Wilton Arellano MD 4/4/2022 5788        Implants:  Implant Name Type Inv. Item Serial No.  Lot No. LRB No. Used Action   STENT URETERAL 6 FRX28 CM FIRM MONOFILAMENT TRIA - [id number]  STENT URETERAL 6 FRX28 CM FIRM MONOFILAMENT TRIA  [employer] UROLOGY-WD [id number] Right 1 Implanted         Drains:   Closed/Suction Drain Left LLQ Bulb 15 Pashto (Active)   Site Description Unable to view 04/04/22 1641   Dressing Status Clean;Dry; Intact 04/04/22 1641   Drainage Appearance Bloody 04/04/22 1641   Status Compressed 04/04/22 1641       Urethral Catheter Triple-lumen 22 fr (Active)   Site Assessment No urethral drainage 04/04/22 1640   Urine Color Tea 04/04/22 1640   Urine Appearance Clear 04/04/22 1640       Findings: See dictated operative note.     Electronically signed by Nida Marie PA-C on 4/4/2022 at 4:47 PM

## 2022-04-04 NOTE — ANESTHESIA POSTPROCEDURE EVALUATION
Department of Anesthesiology  Postprocedure Note    Patient: Michelle Sepulveda  MRN: [id number]  YOB: 1950  Date of evaluation: 4/4/2022  Time:  6:25 PM     Procedure Summary     Date: 04/04/22 Room / Location: [address] [facility] [place]    Anesthesia Start: 1260 Anesthesia Stop: 1634    Procedure: CYSTOSCOPY, RIGHT STENT EXCHANGE, RIGHT RETROGRADE PYELOGRAM, ROBOTIC RIGHT URETERAL REIMPLANT (Right ) Diagnosis: (Ureteral stricture)    Surgeons: Collin Reradon MD Responsible Provider: Neida Cooper MD    Anesthesia Type: general ASA Status: 3          Anesthesia Type: general    Moira Phase I: Moira Score: 10    Moira Phase II: Moira Score: 10    Last vitals: Reviewed and per EMR flowsheets. Anesthesia Post Evaluation    Patient location during evaluation: PACU  Patient participation: complete - patient participated  Level of consciousness: awake and alert  Airway patency: patent  Nausea & Vomiting: no nausea and no vomiting  Complications: no  Cardiovascular status: hemodynamically stable  Respiratory status: acceptable  Hydration status: euvolemic      [address]  POST-ANESTHESIA NOTE       Name:  Michelle Sepulveda                                         Age:  66 y.o.   MRN:  [id number]      Last Vitals:  BP (!) 170/73   Pulse 53   Temp 97.3 °F (36.3 °C) (Temporal)   Resp 18   Ht 5' 11\" (1.803 m)   Wt 236 lb 9.6 oz (107.3 kg)   SpO2 92%   BMI 33.00 kg/m²   Patient Vitals for the past 4 hrs:   BP Temp Temp src Pulse Resp SpO2   04/04/22 1753 (!) 170/73   53 18 92 %   04/04/22 1731 (!) 184/81 97.3 °F (36.3 °C) Temporal 61 18 93 %   04/04/22 1715 (!) 167/78   60 22 95 %   04/04/22 1710 (!) 187/81   62 20 94 %   04/04/22 1705 (!) 177/80   62 25 96 %   04/04/22 1700 (!) 163/75   56 17 95 %   04/04/22 1655 (!) 170/75   58 16 97 %   04/04/22 1650 (!) 173/72   57 13 95 %   04/04/22 1645 (!) 202/87   61 22 98 %   04/04/22 1640 (!) 210/87   58 24 100 %   04/04/22

## 2022-04-04 NOTE — OP NOTE
FACILITY:  [address] [place] [address] [facility], [address] [place] [address]   0/20/2882   [id number]      DATE: 04/04/22   SURGEON: Dr. Nadeem Gifford MD , M.D.  Magnus Graft: Dr. Amanda Wang MD  PREOPERATIVE DIAGNOSIS:  Right ureteral stricture with obstruction  POSTOPERATIVE DIAGNOSIS: same  OPERATION:    Cystoscopy right retrograde pyelogram  Right ureteral stent exchange  Robotic assisted right ureteral reimplantation, (antirefluxing lich vani technique).      ANESTHESIA:   General.   COMPLICATIONS:  None.   ESTIMATED BLOOD LOSS:  Minimal.  FLUIDS:  Crystalloid. SPECIMENS:  Distal ureteral margin - no cancer. DRAINS:    22 fr 3 way ortiz catheter, right side ureteral stent, 15 fr jayla drain. SPECIMEN:  None     INDICATIONS FOR PROCEDURE:  Imani Dacosta is a 66 y.o. male  presents today for robotic assisted right side ureteral reimplant. She has history of BL ureteral reflux, s/p right ureteral reimplant, and now is having recurrent UTI with documented left ureteral reflux. After risks, benefits and alternatives of the procedure were discussed with the patient he elected to proceed.      PROCEDURE:  The patient was properly identified in the preoperative area and taken to the operating room and placed on the table in the supine position. General anesthesia was induced and the patient's arms were tucked and all pressure points were padded. EPC cuffs were placed and activated. IV antibiotics with cefoxitin were given preoperatively. Preoperative heparin was also given. We started with the patient in dorsal lithotomy position, and performed a cystoscopy right stent exchange with a 6x28 cm double J stent. We did a retrograde pyelogram as well as he has history of difficult exchange. The stent was confirmed in good position in the renal pelvis. The patient was placed into a steep Trendelenburg position and prepped and draped in the standard fashion. A 22 fr 3 way Ortiz catheter was placed in the bladder.  A urine culture was obtained. Veress needle was placed through a supraumbilical incision into the peritoneum to obtain pneumoperitoneum to 60 mmHg. An 8 mm robotic port was placed and all other robotic ports were left in in the usual fashion, including 2 assistant ports on the patient's right-hand side. The robotic was docked. The bowel was taken down out of the pelvis releasing any sigmoid attachment on the left side to identify the left ureter. The peritoneum overlying the right side ureter was incised and the ureter was mobilized down from the common iliac artery down to the insertion into the bladder. At the hiatus the distal ureter was clipped with 2x Hem-O-Linwood clips, and the ureter was transected. We sent the distal ureteral margin to confirm the previous biopsy, negative for cancer. We had adequate length on the ureter. We then found a site on the left dome of the bladder to perform our reimplantation. We spatulated the ureter, ensuring proper orientation. We dissected the muscle off the mucosa and entered the distended bladder. At this point we performed a ureteral reimplant using a Lisch Raudel technique. We used 4-0 Monocryl sutures, anchoring the crotch of the spatulated ureter to the bladder mucosa and running the sutures along both sides. We then advanced a 6x28 cm double J ureteral stent into the into the bladder. We then finished our anastomosis and placed a U-Stitch using 3-0 chromic suture at the apex. We then reapproximated the muscle over the anastomosis using 3-0 vicryl suture in an antirefluxing manner. This was a tension free, water tight anastomosis. We placed a TAURUS drain through the 4th robotic port. At this point we irrigated the pelvis and the robotic portion of the procedure was complete. All wounds were irrigated and skin incisions were closed with 4-0 Monocryl subcuticular or deep dermal sutures. DermaFlex glue was applied. Urostomy bag was applied.  This is the end of the procedure. The patient tolerated the procedure well and was awoken from anesthesia and taken to recovery room in good condition.

## 2022-04-04 NOTE — PROGRESS NOTES
1630- pt received to pacu, oral airway in place, resp easy, unlabored. Vss. Pt appears in no acute distress. 2249- pt reports no pain at this time, drifts back to sleep quickly.     [id number]- pt meets criteria for discharged from pacu, returned to [address] [place] [address], report given to Silvina Garcia

## 2022-04-04 NOTE — PROGRESS NOTES
Cystoscopy, Right stent exchange, Right retrograde pyelogram began at 1310 and ended at 1316. Robotic portion of the procedure began at 1349.

## 2022-04-04 NOTE — ANESTHESIA PRE PROCEDURE
Department of Anesthesiology  Preprocedure Note       Name:  Jered Knox   Age:  66 y.o.  :  1950                                          MRN:  [id number]         Date:  2022      Surgeon: Munira Kerns):  Rekha Gentile MD    Procedure: Procedure(s):  CYSTOSCOPY RIGHT STENT EXCHANGE ROBOTIC RIGHT URETERAL REIMPLANT POSSIBLE BOARI FLAP    Medications prior to admission:   Prior to Admission medications    Medication Sig Start Date End Date Taking?  Authorizing Provider   sulfamethoxazole-trimethoprim (BACTRIM DS;SEPTRA DS) 800-160 MG per tablet Take 1 tablet by mouth 2 times daily for 14 days 3/24/22 4/7/22  RASHAD Roper - CNP   atorvastatin (LIPITOR) 40 MG tablet TAKE 1 TABLET DAILY 3/5/22   RASHAD Christian - CNP   metoprolol tartrate (LOPRESSOR) 25 MG tablet TAKE 1 TABLET TWICE A DAY 3/5/22   RASHAD Christian - CNP   vitamin D (ERGOCALCIFEROL) 1.25 MG (56265 UT) CAPS capsule TAKE 1 CAPSULE ONCE A WEEK 22   RASHAD Christian - CNP   losartan (COZAAR) 100 MG tablet Take 1 tablet by mouth daily 10/30/21   Pilar Horn MD   aspirin 81 MG EC tablet Take 81 mg by mouth 2 times daily     Historical Provider, MD   acetaminophen (TYLENOL ARTHRITIS PAIN) 650 MG extended release tablet Take 650 mg by mouth every 8 hours as needed for Pain    Historical Provider, MD   sodium chloride (OCEAN, BABY AYR) 0.65 % nasal spray 1 spray by Nasal route as needed for Congestion    Historical Provider, MD   Multiple Vitamin (MULTI-VITAMIN) TABS Take by mouth daily     Historical Provider, MD       Current medications:    Current Facility-Administered Medications   Medication Dose Route Frequency Provider Last Rate Last Admin    0.9 % sodium chloride infusion   IntraVENous Continuous Rekha Gentile  mL/hr at 22 1110 New Bag at 22 1110    ceFAZolin (ANCEF) 2000 mg in sterile water 20 mL IV syringe  2,000 mg IntraVENous 30 Min Pre-Op Eufemia Meyer MD           Allergies:  No Known Allergies    Problem List:    Patient Active Problem List   Diagnosis Code    CAD (coronary artery disease) I25.10    Hypertension I10    Hyperlipidemia E78.5    S/P CABG x 6,2007 Z95.1    CAD (coronary artery disease) I25.10    Colon polyps K63.5       Past Medical History:        Diagnosis Date    Arthritis     ASHD (arteriosclerotic heart disease)     CAD (coronary artery disease)     Status post bypass    Colon polyps 2009      colonoscopy  tubular  adenoma   rinesmith    Decreased sense of smell 5/22/2015    Epistaxis 5/22/2015    History of blood transfusion 2005    ? ???? with by pass? ??    Hyperlipidemia     Hypertension     Kidney stones     Mild carotid artery disease ([facility] [address] 2013    Nasal obstruction 5/22/2015    Nasal septal perforation 5/22/2015       Past Surgical History:        Procedure Laterality Date    BLADDER SURGERY Right 12/20/2021    CYSTO, URETEROSCOPY, RIGHT RETROGRADE PYELOGRAM, URETERAL DILATION, WITH URETERAL BX RIGHT URETERAL STENT PLACEMENT performed by Eufemia Meyer MD at [facility] 1/31/2022    CYSTOSCOPY RIGHT RETROGRADE PYELOGRAM, RIGHT STENT EXCHANGE performed by Eufemia Meyer MD at [address]  2005    6 by pass    COLONOSCOPY  12/2019    Dr Boom Alonso  colon polyps 8//2014 9-2019    COLONOSCOPY Left 09/13/2019    COLONOSCOPY POLYPECTOMY SNARE/COLD BIOPSY performed by Connie Taylor MD at [address]  11/2005    EXCISION OF AURAL MASS      NASAL POLYP SURGERY  06/05/2015    BIOPSY OF NASAL POLYP MIDDLE TURBINATE AND BIOPSY SEPTAL 2021 [facility] SKIN CANCER EXCISION  2018    on back    TONSILLECTOMY      as a child       Social History:    Social History     Tobacco Use    Smoking status: Former Smoker     Packs/day: 1.50     Years: 30.00     Pack years: 45.00     Types: Cigarettes     Quit date: 6/3/2002 Years since quittin.8    Smokeless tobacco: Never Used   Substance Use Topics    Alcohol use: Yes     Comment: seldom                                Counseling given: Not Answered      Vital Signs (Current):   Vitals:    22 1451 22 1020 22 1023   BP:  (!) 152/70    Pulse:  66    Resp:  16    Temp:  97.6 °F (36.4 °C)    TempSrc:  Temporal    SpO2:  95%    Weight: 235 lb (106.6 kg)  236 lb 9.6 oz (107.3 kg)   Height:   5' 11\" (1.803 m)                                              BP Readings from Last 3 Encounters:   22 (!) 152/70   22 (!) 144/78   22 130/63       NPO Status: Time of last liquid consumption:  (sip of water with medication at 0700 this am)                        Time of last solid consumption:                         Date of last liquid consumption: 22                        Date of last solid food consumption: 22    BMI:   Wt Readings from Last 3 Encounters:   22 236 lb 9.6 oz (107.3 kg)   22 241 lb (109.3 kg)   22 236 lb 6.4 oz (107.2 kg)     Body mass index is 33 kg/m². CBC:   Lab Results   Component Value Date    WBC 8.0 2022    RBC 4.32 2022    RBC 4.44 2020    RBC 3 2020    HGB 13.9 2022    HCT 42.2 2022    MCV 97.7 2022    RDW 13.8 2020     2022       CMP:   Lab Results   Component Value Date     2022    K 4.2 2022    K 4.8 2021     2022    CO2 20 2022    BUN 20 2022    CREATININE 0.9 2022    LABGLOM 83 2022    GLUCOSE 100 2022    GLUCOSE 97 2020    PROT 7.1 2021    CALCIUM 9.3 2022    BILITOT 0.2 2021    BILITOT Negative 2020    ALKPHOS 123 2021    AST 21 2021    ALT 21 2021       POC Tests: No results for input(s): POCGLU, POCNA, POCK, POCCL, POCBUN, POCHEMO, POCHCT in the last 72 hours.     Coags:   Lab Results   Component Value Date INR 1.09 06/03/2015    APTT 25.7 06/03/2015       HCG (If Applicable): No results found for: PREGTESTUR, PREGSERUM, HCG, HCGQUANT     ABGs: No results found for: PHART, PO2ART, HXA6CBC, DZK5LQV, BEART, R7KHMWDA     Type & Screen (If Applicable):  No results found for: LABABO, LABRH    Drug/Infectious Status (If Applicable):  No results found for: HIV, HEPCAB    COVID-19 Screening (If Applicable): No results found for: COVID19        Anesthesia Evaluation    Airway: Mallampati: II  TM distance: >3 FB   Neck ROM: full  Mouth opening: > = 3 FB Dental:          Pulmonary:   (+) COPD:  decreased breath sounds,                             Cardiovascular:    (+) hypertension:, CAD:, CABG/stent:,         Rhythm: regular                      Neuro/Psych:               GI/Hepatic/Renal:             Endo/Other:                     Abdominal:   (+) obese,           Vascular: Other Findings:             Anesthesia Plan      general     ASA 3       Induction: intravenous. MIPS: Postoperative opioids intended and Prophylactic antiemetics administered. Anesthetic plan and risks discussed with patient, spouse and child/children. Plan discussed with CRNA.                   Alvin Wilkinson MD   4/4/2022

## 2022-04-04 NOTE — PROGRESS NOTES
Pt admitted to [facility] room 2 and oriented to unit. SCD sleeves applied. Nares swabbed. Pt verbalized permission for first name, last initial and physicians name on white board. SDS board and discharge criteria explained, pt and family verbalized understanding. Pt denies thoughts of harming self or others. Call light in reach. Wife Sofi January at the bedside.

## 2022-04-04 NOTE — PROGRESS NOTES
Pt returned to [facility] room 2. Vitals and assessment as charted. 0.9 infusing, @850ml to count from PACU. Pt has pop. Family at the bedside. Pt and family verbalized understanding of call light use. Call light in reach.

## 2022-04-04 NOTE — H&P
[facility]  Urology H&P Note     Patient:  Alicja Liao  MRN: [id number]  YOB: 1950    ATTENDING: Shabana Elliott MD     CHIEF COMPLAINT:  Right ureteral obstruction    HISTORY OF PRESENT ILLNESS:   The patient is a 66 y.o. male who presents with right ureteral stricture, suspected from stone passage, previous biopsy was negative for cancer. He has stent in place, and is here for ureteral reimplantation. Risks benefits and alternative procedures are explained, informed consent is obtained, and the patient elects to proceed. Patient's old records, notes and chart reviewed and summarized above. Past Medical History:    Past Medical History:   Diagnosis Date    Arthritis     ASHD (arteriosclerotic heart disease)     CAD (coronary artery disease)     Status post bypass    Colon polyps 2009      colonoscopy  tubular  adenoma   rinesmith    Decreased sense of smell 5/22/2015    Epistaxis 5/22/2015    History of blood transfusion 2005    ? ???? with by pass? ??    Hyperlipidemia     Hypertension     Kidney stones     Mild carotid artery disease ([facility] [address] 2013    Nasal obstruction 5/22/2015    Nasal septal perforation 5/22/2015       Past Surgical History:    Past Surgical History:   Procedure Laterality Date    BLADDER SURGERY Right 12/20/2021    CYSTO, URETEROSCOPY, RIGHT RETROGRADE PYELOGRAM, URETERAL DILATION, WITH URETERAL BX RIGHT URETERAL STENT PLACEMENT performed by Jayant Denny MD at [facility] 1/31/2022    CYSTOSCOPY RIGHT RETROGRADE PYELOGRAM, RIGHT STENT EXCHANGE performed by Jayant Denny MD at [address] [place] [address],[address]  2005    6 by pass    COLONOSCOPY  12/2019    Dr Sherman Doyle  colon polyps 8//2014 9-2019    COLONOSCOPY Left 09/13/2019    COLONOSCOPY POLYPECTOMY SNARE/COLD BIOPSY performed by Lupis Diaz MD at [place] [address]  11/2005  EXCISION OF AURAL MASS      NASAL POLYP SURGERY  2015    BIOPSY OF NASAL POLYP MIDDLE TURBINATE AND BIOPSY SEPTAL PERFORTATION    SKIN CANCER EXCISION  2018    on back    TONSILLECTOMY      as a child       Medications Prior to Admission:   Prior to Admission medications    Medication Sig Start Date End Date Taking? Authorizing Provider   sulfamethoxazole-trimethoprim (BACTRIM DS;SEPTRA DS) 800-160 MG per tablet Take 1 tablet by mouth 2 times daily for 14 days 3/24/22 4/7/22  Linnea PhoenixRASHAD CNP   atorvastatin (LIPITOR) 40 MG tablet TAKE 1 TABLET DAILY 3/5/22   RASHAD Hoyos CNP   metoprolol tartrate (LOPRESSOR) 25 MG tablet TAKE 1 TABLET TWICE A DAY 3/5/22   RASHAD Hoyos CNP   vitamin D (ERGOCALCIFEROL) 1.25 MG (95558 UT) CAPS capsule TAKE 1 CAPSULE ONCE A WEEK 22   RASHAD Hoyos CNP   losartan (COZAAR) 100 MG tablet Take 1 tablet by mouth daily 10/30/21   Collette Moreno MD   aspirin 81 MG EC tablet Take 81 mg by mouth 2 times daily     Historical Provider, MD   acetaminophen (TYLENOL ARTHRITIS PAIN) 650 MG extended release tablet Take 650 mg by mouth every 8 hours as needed for Pain    Historical Provider, MD   sodium chloride (OCEAN, BABY AYR) 0.65 % nasal spray 1 spray by Nasal route as needed for Congestion    Historical Provider, MD   Multiple Vitamin (MULTI-VITAMIN) TABS Take by mouth daily     Historical Provider, MD       Allergies:  Patient has no known allergies.     Social History:    Social History     Socioeconomic History    Marital status:      Spouse name: Not on file    Number of children: Not on file    Years of education: Not on file    Highest education level: Not on file   Occupational History    Not on file   Tobacco Use    Smoking status: Former Smoker     Packs/day: 1.50     Years: 30.00     Pack years: 45.00     Types: Cigarettes     Quit date: 6/3/2002     Years since quittin.8    Smokeless tobacco: Never Used   Vaping Use    Vaping Use: Never used   Substance and Sexual Activity    Alcohol use: Yes     Comment: seldom    Drug use: No    Sexual activity: Not Currently     Partners: Female   Other Topics Concern    Not on file   Social History Narrative    Not on file     Social Determinants of Health     Financial Resource Strain: Low Risk     Difficulty of Paying Living Expenses: Not hard at all   Food Insecurity: No Food Insecurity    Worried About [address] in the Last Year: Never true    [address] in the Last Year: Never true   Transportation Needs:     Lack of Transportation (Medical): Not on file    Lack of Transportation (Non-Medical): Not on file   Physical Activity:     Days of Exercise per Week: Not on file    Minutes of Exercise per Session: Not on file   Stress:     Feeling of Stress : Not on file   Social Connections:     Frequency of Communication with Friends and Family: Not on file    Frequency of Social Gatherings with Friends and Family: Not on file    Attends Oriental orthodox Services: Not on file    Active Member of [address] [place] [address] or Organizations: Not on file    Attends Club or Organization Meetings: Not on file    Marital Status: Not on file   Intimate Partner Violence:     Fear of Current or Ex-Partner: Not on file    Emotionally Abused: Not on file    Physically Abused: Not on file    Sexually Abused: Not on file   Housing Stability:     Unable to Pay for Housing in the Last Year: Not on file    Number of [place] in the Last Year: Not on file    Unstable Housing in the Last Year: Not on file       Family History:    Family History   Problem Relation Age of Onset    Heart Disease Mother     Cancer Father         throat    Diabetes Sister     Asthma Brother        REVIEW OF SYSTEMS:  All systems reviewed and negative except for that already noted in the HPI.     Physical Exam:      Patient Vitals for the past 24 hrs:   BP Temp Temp src Pulse Resp SpO2 Height Weight   04/04/22 1023 -- -- -- -- -- -- 5' 11\" (1.803 m) 236 lb 9.6 oz (107.3 kg)   04/04/22 1020 (!) 152/70 97.6 °F (36.4 °C) Temporal 66 16 95 % -- --     Constitutional: Patient in no acute distress; Neuro: alert and oriented to person place and time. Psych: Mood and affect normal.  Skin: Normal  Lungs: Respiratory effort normal  Cardiovascular:  Normal peripheral pulses  Abdomen: Soft, non-tender, non-distended with no CVA, flank pain, hepatosplenomegaly or hernia. Kidneys normal.  Bladder non-tender and not distended.   Lymphatics: no palpable lymphadenopathy        Assessment and Plan   Impression:    Patient Active Problem List   Diagnosis    CAD (coronary artery disease)    Hypertension    Hyperlipidemia    S/P CABG x 6,2007    CAD (coronary artery disease)    Colon polyps       Plan:   Right robotic ureteral reimplantation

## 2022-04-05 ENCOUNTER — TELEPHONE (OUTPATIENT)
Dept: UROLOGY | Age: 72
End: 2022-04-05

## 2022-04-05 VITALS
SYSTOLIC BLOOD PRESSURE: 119 MMHG | WEIGHT: 235 LBS | HEART RATE: 73 BPM | TEMPERATURE: 98.6 F | DIASTOLIC BLOOD PRESSURE: 60 MMHG | OXYGEN SATURATION: 97 % | RESPIRATION RATE: 18 BRPM | HEIGHT: 71 IN | BODY MASS INDEX: 32.9 KG/M2

## 2022-04-05 LAB
ANION GAP SERPL CALCULATED.3IONS-SCNC: 12 MEQ/L (ref 8–16)
BASOPHILS # BLD: 0.3 %
BASOPHILS ABSOLUTE: 0 THOU/MM3 (ref 0–0.1)
BUN BLDV-MCNC: 15 MG/DL (ref 7–22)
CALCIUM SERPL-MCNC: 8.1 MG/DL (ref 8.5–10.5)
CHLORIDE BLD-SCNC: 109 MEQ/L (ref 98–111)
CO2: 18 MEQ/L (ref 23–33)
CREAT SERPL-MCNC: 0.9 MG/DL (ref 0.4–1.2)
EOSINOPHIL # BLD: 3.4 %
EOSINOPHILS ABSOLUTE: 0.4 THOU/MM3 (ref 0–0.4)
ERYTHROCYTE [DISTWIDTH] IN BLOOD BY AUTOMATED COUNT: 13.2 % (ref 11.5–14.5)
ERYTHROCYTE [DISTWIDTH] IN BLOOD BY AUTOMATED COUNT: 49.4 FL (ref 35–45)
GFR SERPL CREATININE-BSD FRML MDRD: 83 ML/MIN/1.73M2
GLUCOSE BLD-MCNC: 120 MG/DL (ref 70–108)
HCT VFR BLD CALC: 38.8 % (ref 42–52)
HEMOGLOBIN: 12.2 GM/DL (ref 14–18)
IMMATURE GRANS (ABS): 0.03 THOU/MM3 (ref 0–0.07)
IMMATURE GRANULOCYTES: 0.3 %
LYMPHOCYTES # BLD: 18.4 %
LYMPHOCYTES ABSOLUTE: 1.9 THOU/MM3 (ref 1–4.8)
MCH RBC QN AUTO: 31.9 PG (ref 26–33)
MCHC RBC AUTO-ENTMCNC: 31.4 GM/DL (ref 32.2–35.5)
MCV RBC AUTO: 101.6 FL (ref 80–94)
MONOCYTES # BLD: 9.1 %
MONOCYTES ABSOLUTE: 0.9 THOU/MM3 (ref 0.4–1.3)
NUCLEATED RED BLOOD CELLS: 0 /100 WBC
PLATELET # BLD: 183 THOU/MM3 (ref 130–400)
PMV BLD AUTO: 9.6 FL (ref 9.4–12.4)
POTASSIUM SERPL-SCNC: 4.4 MEQ/L (ref 3.5–5.2)
RBC # BLD: 3.82 MILL/MM3 (ref 4.7–6.1)
SEG NEUTROPHILS: 68.5 %
SEGMENTED NEUTROPHILS ABSOLUTE COUNT: 7.1 THOU/MM3 (ref 1.8–7.7)
SODIUM BLD-SCNC: 139 MEQ/L (ref 135–145)
WBC # BLD: 10.4 THOU/MM3 (ref 4.8–10.8)

## 2022-04-05 PROCEDURE — 6370000000 HC RX 637 (ALT 250 FOR IP): Performed by: PHYSICIAN ASSISTANT

## 2022-04-05 PROCEDURE — 36415 COLL VENOUS BLD VENIPUNCTURE: CPT

## 2022-04-05 PROCEDURE — 82565 ASSAY OF CREATININE: CPT

## 2022-04-05 PROCEDURE — APPNB30 APP NON BILLABLE TIME 0-30 MINS: Performed by: UROLOGY

## 2022-04-05 PROCEDURE — 2500000003 HC RX 250 WO HCPCS: Performed by: PHYSICIAN ASSISTANT

## 2022-04-05 PROCEDURE — 6360000002 HC RX W HCPCS: Performed by: PHYSICIAN ASSISTANT

## 2022-04-05 PROCEDURE — 80048 BASIC METABOLIC PNL TOTAL CA: CPT

## 2022-04-05 PROCEDURE — 94760 N-INVAS EAR/PLS OXIMETRY 1: CPT

## 2022-04-05 PROCEDURE — 85025 COMPLETE CBC W/AUTO DIFF WBC: CPT

## 2022-04-05 RX ORDER — FLUCONAZOLE 150 MG/1
150 TABLET ORAL DAILY
Qty: 1 TABLET | Refills: 0 | Status: SHIPPED | OUTPATIENT
Start: 2022-04-05 | End: 2022-04-06

## 2022-04-05 RX ORDER — SENNA AND DOCUSATE SODIUM 50; 8.6 MG/1; MG/1
1 TABLET, FILM COATED ORAL 2 TIMES DAILY
Qty: 30 TABLET | Refills: 0 | Status: SHIPPED | OUTPATIENT
Start: 2022-04-05 | End: 2022-05-09

## 2022-04-05 RX ORDER — HYDROCODONE BITARTRATE AND ACETAMINOPHEN 5; 325 MG/1; MG/1
1 TABLET ORAL EVERY 8 HOURS PRN
Qty: 15 TABLET | Refills: 0 | Status: SHIPPED | OUTPATIENT
Start: 2022-04-05 | End: 2022-04-10

## 2022-04-05 RX ADMIN — SULFAMETHOXAZOLE AND TRIMETHOPRIM 1 TABLET: 800; 160 TABLET ORAL at 08:43

## 2022-04-05 RX ADMIN — LOSARTAN POTASSIUM 100 MG: 100 TABLET, FILM COATED ORAL at 08:44

## 2022-04-05 RX ADMIN — SENNOSIDES AND DOCUSATE SODIUM 1 TABLET: 50; 8.6 TABLET ORAL at 08:45

## 2022-04-05 RX ADMIN — BACITRACIN ZINC NEOMYCIN SULFATE POLYMYXIN B SULFATE: 400; 3.5; 5 OINTMENT TOPICAL at 08:42

## 2022-04-05 RX ADMIN — ACETAMINOPHEN 650 MG: 325 TABLET ORAL at 06:26

## 2022-04-05 RX ADMIN — ACETAMINOPHEN 650 MG: 325 TABLET ORAL at 13:33

## 2022-04-05 RX ADMIN — ACETAMINOPHEN 650 MG: 325 TABLET ORAL at 01:38

## 2022-04-05 RX ADMIN — CEFOXITIN SODIUM 1000 MG: 1 POWDER, FOR SOLUTION INTRAVENOUS at 06:25

## 2022-04-05 RX ADMIN — METOPROLOL TARTRATE 25 MG: 25 TABLET, FILM COATED ORAL at 08:43

## 2022-04-05 RX ADMIN — CEFOXITIN SODIUM 1000 MG: 1 POWDER, FOR SOLUTION INTRAVENOUS at 11:47

## 2022-04-05 ASSESSMENT — PAIN SCALES - GENERAL
PAINLEVEL_OUTOF10: 0
PAINLEVEL_OUTOF10: 5
PAINLEVEL_OUTOF10: 1

## 2022-04-05 NOTE — TELEPHONE ENCOUNTER
CYSTOSCOPY, RIGHT STENT EXCHANGE, RIGHT RETROGRADE PYELOGRAM, ROBOTIC RIGHT URETERAL REIMPLANT per Bobo    Will need scheduled for pathology results in 1 wk with Dr Shane Gaviria  Will need eventually scheduled for ureteral stent removal or exchange

## 2022-04-05 NOTE — TELEPHONE ENCOUNTER
Patient was the [address] [place] [address] Nena. surgery from 4/4/22. I was told to schedule him back to review pathology, however, Mr. John Sanchez will not be back in office until 4/25/22. I added him to the 4/18 at 11:15pm.  If this is not okay, please let me know and I will move him. Thank you.

## 2022-04-05 NOTE — CARE COORDINATION
4/5/22, 7:50 AM EDT  DISCHARGE PLANNING EVALUATION:    Filipe Torres       Admitted: 4/4/2022/ [facility] [facility] day: 0   Location: [facility]13/013-A Reason for admit: Ureteral stricture [N13.5]   PMH:  has a past medical history of Arthritis, ASHD (arteriosclerotic heart disease), CAD (coronary artery disease), Colon polyps, Decreased sense of smell, Epistaxis, History of blood transfusion, Hyperlipidemia, Hypertension, Kidney stones, Mild carotid artery disease ([address] Nasal obstruction, and Nasal septal perforation. Procedure:   4/4 CYSTOSCOPY, RIGHT STENT EXCHANGE, RIGHT RETROGRADE PYELOGRAM, ROBOTIC RIGHT URETERAL REIMPLANT  Barriers to Discharge:  Urology following. IVF. IV cefoxitin. PO bactrim. Daily wts. I/O. Ortiz teaching. PCP: Silvino Campbell MD   %    Patient Goals/Plan/Treatment Preferences: Spoke with Kelley Kumar and his wife, plan to return home at discharge. Nurse will teach incision care and ortiz care. Wife believes they should be able to manage. Encouraged to notify nurse if needs arise. Transportation/Food Security/Housekeeping Addressed:  No issues identified. 4/5/22, 1:52 PM EDT    Patient goals/plan/ treatment preferences discussed by  and . Patient goals/plan/ treatment preferences reviewed with patient/ family. Patient/ family verbalize understanding of discharge plan and are in agreement with goal/plan/treatment preferences. Understanding was demonstrated using the teach back method. AVS provided by RN at time of discharge, which includes all necessary medical information pertaining to the patients current course of illness, treatment, post-discharge goals of care, and treatment preferences.

## 2022-04-05 NOTE — TELEPHONE ENCOUNTER
Patient was scheduled for a follow up, path review on 4/18 at 1130; called patient to let them know that we needed to move the time up to 11:00am; left voicemail for patient regarding this change.

## 2022-04-05 NOTE — PROGRESS NOTES
Patient discharged to home with spouse. Discharge instructions reviewed with patient, daughter, and spouse. Instructed patient on how to empty ortiz bag and apply leg bag if needed. Patient and spouse voiced understanding. Informed patient of scripts sent to [facility]. Home medications returned to patient. Patient assisted to main lobby with transport.

## 2022-04-05 NOTE — PROGRESS NOTES
Clarita Hamm, APRN - CNP  Urology Progress Note    Subjective: Kevin Batista is a 66 y.o. male. s/p CYSTOSCOPY, RIGHT STENT EXCHANGE, RIGHT RETROGRADE PYELOGRAM, ROBOTIC RIGHT URETERAL REIMPLANT on 4/4/2022    His/Her current Diet is: ADULT DIET; Clear Liquid. Since the previous note, the patient reports the following:  No acute issues overnight. No fevers or chills. No nausea or vomiting. No chest pain or shortness of breath. No calf pain. Pain Controlled. Ambulating. Tolerating PO Diet. Passing gas  TAURUS with 60ml out overnight, TAURUS fluid CRT sent  Jackson draining yellow urine 1200UOP      Vitals and Labs:  Patient Vitals for the past 24 hrs:   BP Temp Temp src Pulse Resp SpO2 Height Weight   04/05/22 0815 (!) 148/63 97.5 °F (36.4 °C) Oral 73 18 98 % -- --   04/05/22 0624 122/60 98.2 °F (36.8 °C) Oral 76 18 98 % -- --   04/05/22 0420 -- -- -- -- -- -- -- 235 lb (106.6 kg)   04/04/22 1929 (!) 184/81 98.5 °F (36.9 °C) Oral 88 18 95 % -- --   04/04/22 1912 (!) 167/79 -- -- 68 20 98 % -- --   04/04/22 1828 (!) 166/77 -- -- 63 18 93 % -- --   04/04/22 1753 (!) 170/73 -- -- 53 18 92 % -- --   04/04/22 1731 (!) 184/81 97.3 °F (36.3 °C) Temporal 61 18 93 % -- --   04/04/22 1715 (!) 167/78 -- -- 60 22 95 % -- --   04/04/22 1710 (!) 187/81 -- -- 62 20 94 % -- --   04/04/22 1705 (!) 177/80 -- -- 62 25 96 % -- --   04/04/22 1700 (!) 163/75 -- -- 56 17 95 % -- --   04/04/22 1655 (!) 170/75 -- -- 58 16 97 % -- --   04/04/22 1650 (!) 173/72 -- -- 57 13 95 % -- --   04/04/22 1645 (!) 202/87 -- -- 61 22 98 % -- --   04/04/22 1640 (!) 210/87 -- -- 58 24 100 % -- --   04/04/22 1635 (!) 161/76 -- -- 60 23 100 % -- --   04/04/22 1630 (!) 162/78 97.5 °F (36.4 °C) Temporal 59 14 100 % -- --   04/04/22 1023 -- -- -- -- -- -- 5' 11\" (1.803 m) 236 lb 9.6 oz (107.3 kg)   04/04/22 1020 (!) 152/70 97.6 °F (36.4 °C) Temporal 66 16 95 % -- --     I/O last 3 completed shifts:   In: 1800 [I.V.:1800]  Out: 2010 [Urine:1900; Drains:60; Blood:50]    Recent Labs     04/04/22 2051 04/05/22  0630   WBC 14.1* 10.4   HGB 13.4* 12.2*   HCT 42.7 38.8*   .2* 101.6*    183     Recent Labs     04/04/22 2051 04/05/22  0630    139   K 5.1 4.4    109   CO2 18* 18*   BUN 18 15   CREATININE 1.0 0.9       No results for input(s): COLORU, PHUR, LABCAST, WBCUA, RBCUA, MUCUS, TRICHOMONAS, YEAST, BACTERIA, CLARITYU, SPECGRAV, LEUKOCYTESUR, UROBILINOGEN, BILIRUBINUR, BLOODU in the last 72 hours. Invalid input(s): NITRATE, GLUCOSEUKETONESUAMORPHOUS    Physical Exam:  No acute distress. Awake, alert and oriented. Neck is supple  Regular rate and rhythm. Normal peripheral pulses  No accessory muscles of inspiration. Symmetric chest rise  Abdomen soft, some expected tenderness, slight distention. No CVA tenderness. Incisional sites well approximated, clean, dry, sutures intact, no ecchymosis  No calf pain. Minimal/no edema in bilateral lower extremities. Skin is warm, dry  Psych: mood, affect normal    Additional Lab/Culture results:     Imaging Reviewed:     RASHAD Farrell CNP independently reviewed the images and verified the radiology reports from:    No results found. Impression:    Patient Active Problem List   Diagnosis    CAD (coronary artery disease)    Hypertension    Hyperlipidemia    S/P CABG x 6,2007    CAD (coronary artery disease)    Colon polyps    Ureteral stenosis, right    Ureteral stricture       s/p CYSTOSCOPY, RIGHT STENT EXCHANGE, RIGHT RETROGRADE PYELOGRAM, ROBOTIC RIGHT URETERAL REIMPLANT on 4/4/2022    Plan:  Ambulate  IC 10xhr  Advance diet  Remove TAURUS prior to discharge  Pain control with oral pain medication    Having flatus  TAURUS with 60ml out overnight, TAURUS fluid CRT sent. Ok to remove  Ortiz draining yellow urine 1200UOP    Will be discharged with ALMA ortiz for path in 1 wk.   Our office will coordinate  Plan for discharge this afternoon    RASHAD Farrell CNP  8:49 AM 4/5/2022

## 2022-04-05 NOTE — PLAN OF CARE
Problem: Pain:  Goal: Pain level will decrease  Description: Pain level will decrease  Outcome: Ongoing  Note: Pt report pain at 0 on scale. Pt states oral/iv/im medication helping to achieve pain goal of a 0 on scale. Problem: Falls - Risk of:  Goal: Will remain free from falls  Description: Will remain free from falls  Note: Pt using call light appropriately to call for assistance with ambulation to the bathroom and to chair. Pt is also compliant with use of non-skid slippers. Pt reports understanding of fall prevention when discussed. Care plan reviewed with patient. Patient verbalizes understanding of the plan of care and contribute to goal setting.

## 2022-04-05 NOTE — PROGRESS NOTES
Pt admitted to  7K13 via cart/stretcher. Complaints: Surgery. IV normal saline infusing into the hand left, condition patent and no redness at a rate of 100 mls/ hour with about 800 mls in the bag still. IV site free of s/s of infection or infiltration. Vital signs obtained. Assessment and data collection initiated. Two nurse skin assessment performed by Jorge Luis Bruce and Yolande RN. Oriented to room. Explained patients right to have family, representative or physician notified of their admission. Patient has Declined for physician to be notified. Patient has Declined for family/representative to be notified. The patient is interested in Mamadou Wilson. Kayces meds to beds program?:  Yes    Policies and procedures for 7K explained. All questions answered with no further questions at this time. Fall prevention and safety brochure discussed with patient. Bed alarm on. Call light in reach.

## 2022-04-06 ENCOUNTER — CARE COORDINATION (OUTPATIENT)
Dept: FAMILY MEDICINE CLINIC | Age: 72
End: 2022-04-06

## 2022-04-06 ASSESSMENT — ENCOUNTER SYMPTOMS
NAUSEA: 0
COUGH: 0
APNEA: 0
ABDOMINAL DISTENTION: 0
ABDOMINAL PAIN: 0
BACK PAIN: 0
SINUS PRESSURE: 0
SHORTNESS OF BREATH: 0
DIARRHEA: 0

## 2022-04-06 NOTE — CARE COORDINATION
Jonathan [facility] Transitions Initial Follow Up Call    Outreach made within 2 business days of discharge: Yes    Patient: Roshan Franklin Patient : 1950   MRN: [id number]  Reason for Admission: urethral stricture  Discharge Date: 22       Spoke with:Ned    Discharge department/facility: [facility] Interactive Patient Contact:  Was patient able to fill all prescriptions: Yes  Was patient instructed to bring all medications to the follow-up visit: Yes  Is patient taking all medications as directed in the discharge summary? Yes  Does patient understand their discharge instructions: Yes  Does patient have questions or concerns that need addressed prior to 7-14 day follow up office visit: no    Scheduled appointment with PCP within 7-14 days    Follow Up  Future Appointments   Date Time Provider Anette Grady   2022 11:30 AM MEGAN Gracia 4724   2022 10:00 AM Dai Golden MD [facility]   2022 10:00 AM RASHAD Lombardi - CNP [facility] P - Ling Jack is doing well. He was discharged with a catheter which he will have until the . His wife is helping him with that. He is managing his pain with norco and tylenol as needed. He was able to reconcile his medications and has a follow up appointment already. I will see him then. He will call if he has any additional needs.     Poornima Moreno RN

## 2022-04-06 NOTE — PROGRESS NOTES
Progress Note  Date: 4/5/2022     Room:[address] [place] [address]  Patient Name:Ned Massey     YOB: 1950     Age:71 y.o. Subjective    Subjective:  Symptoms:  Stable. No shortness of breath, cough or diarrhea. Diet:  Adequate intake. No nausea. Activity level: Returning to normal.    Pain:  He complains of pain that is mild. No current facility-administered medications for this encounter. Current Outpatient Medications   Medication Sig Dispense Refill    bisacodyl (DULCOLAX) 5 MG EC tablet Take 1 tablet by mouth daily as needed for Constipation 30 tablet 0    HYDROcodone-acetaminophen (NORCO) 5-325 MG per tablet Take 1 tablet by mouth every 8 hours as needed for Pain for up to 5 days. 15 tablet 0    sennosides-docusate sodium (SENOKOT-S) 8.6-50 MG tablet Take 1 tablet by mouth 2 times daily 30 tablet 0    fluconazole (DIFLUCAN) 150 MG tablet Take 1 tablet by mouth daily for 1 dose As needed for yeast infection 1 tablet 0    sulfamethoxazole-trimethoprim (BACTRIM DS;SEPTRA DS) 800-160 MG per tablet Take 1 tablet by mouth 2 times daily for 14 days 28 tablet 0    atorvastatin (LIPITOR) 40 MG tablet TAKE 1 TABLET DAILY 90 tablet 3    metoprolol tartrate (LOPRESSOR) 25 MG tablet TAKE 1 TABLET TWICE A  tablet 3    vitamin D (ERGOCALCIFEROL) 1.25 MG (91255 UT) CAPS capsule TAKE 1 CAPSULE ONCE A WEEK 12 capsule 3    losartan (COZAAR) 100 MG tablet Take 1 tablet by mouth daily 90 tablet 1    aspirin 81 MG EC tablet Take 81 mg by mouth 2 times daily       acetaminophen (TYLENOL ARTHRITIS PAIN) 650 MG extended release tablet Take 650 mg by mouth every 8 hours as needed for Pain      sodium chloride (OCEAN, BABY AYR) 0.65 % nasal spray 1 spray by Nasal route as needed for Congestion      Multiple Vitamin (MULTI-VITAMIN) TABS Take by mouth daily         Review of Systems   Constitutional: Negative for activity change.    HENT: Negative for congestion, postnasal drip and sinus pressure. Respiratory: Negative for apnea, cough and shortness of breath. Cardiovascular: Negative for palpitations. Gastrointestinal: Negative for abdominal distention, abdominal pain, diarrhea and nausea. Genitourinary: Negative for dysuria. Musculoskeletal: Negative for arthralgias and back pain. Neurological: Negative for dizziness. Psychiatric/Behavioral: Negative for agitation. Objective         Vitals Last 24 Hours:  TEMPERATURE:  Temp  Av.1 °F (36.7 °C)  Min: 97.5 °F (36.4 °C)  Max: 98.6 °F (37 °C)  RESPIRATIONS RANGE: Resp  Av  Min: 18  Max: 18  PULSE OXIMETRY RANGE: SpO2  Av.7 %  Min: 97 %  Max: 98 %  PULSE RANGE: Pulse  Av  Min: 73  Max: 76  BLOOD PRESSURE RANGE: Systolic (05OFB), GLS:490 , Min:119 , JMU:758   ; Diastolic (20ROE), SBK:95, Min:60, Max:63    I/O (24Hr): Intake/Output Summary (Last 24 hours) at 2022 0446  Last data filed at 2022 1345  Gross per 24 hour   Intake --   Output 2070 ml   Net -2070 ml     Objective:  General Appearance:  Comfortable. Vital signs: (most recent): Blood pressure 119/60, pulse 73, temperature 98.6 °F (37 °C), temperature source Oral, resp. rate 18, height 5' 11\" (1.803 m), weight 235 lb (106.6 kg), SpO2 97 %. Vital signs are normal.    Output: Producing urine and producing stool. HEENT: Normal HEENT exam.    Lungs:  Normal effort and normal respiratory rate. Breath sounds clear to auscultation. Heart: Normal rate. Abdomen: Abdomen is soft and non-distended. (Jackson in place). Bowel sounds are normal.   There is no abdominal tenderness. Extremities: Normal range of motion. Neurological: Patient is alert and oriented to person, place and time. Patient has normal reflexes.       Labs/Imaging/Diagnostics    Labs:  CBC:  Recent Labs     22  20522  0630   WBC 14.1* 10.4   RBC 4.22* 3.82*   HGB 13.4* 12.2*   HCT 42.7 38.8*   .2* 101.6*    183     CHEMISTRIES:  Recent Labs 04/04/22 2051 04/05/22  0630    139   K 5.1 4.4    109   CO2 18* 18*   BUN 18 15   CREATININE 1.0 0.9   GLUCOSE 121* 120*     PT/INR:No results for input(s): PROTIME, INR in the last 72 hours. APTT:No results for input(s): APTT in the last 72 hours. LIVER PROFILE:No results for input(s): AST, ALT, BILIDIR, BILITOT, ALKPHOS in the last 72 hours. Imaging Last 24 Hours:  FLUORO FOR SURGICAL PROCEDURES    Result Date: 4/4/2022  Radiology exam is complete. No Radiologist dictation. Please follow up with ordering provider. Assessment//Plan           Hospital Problems           Last Modified POA    * (Principal) Ureteral stricture 4/4/2022 Yes    Ureteral stenosis, right 4/4/2022 Yes        Assessment:    Condition: In stable condition. Improving. (S/p post ureteroplasty and repair and with ortiz     ashd stable     htn up  Initially an now better as appears  No pain    lipds stable). Plan:   Discharge home (per urology). Ad patrice activity. Regular diet. Administer medications as ordered. (  Home  Today with the ortiz and good urine  output     bp  Now stable as up post op      resume  Home meds  As may have the night before surgery did not take hi bp pill and why yesterday bp up but this am    ashd stable     see end of April in office for appointment as scheduled).        Electronically signed by Hung Mancilla MD on 4/6/22 at 4:46 AM EDT

## 2022-04-11 LAB
CREATININE FLUID: 1 MG/DL
SPECIMEN TYPE: NORMAL

## 2022-04-11 NOTE — TELEPHONE ENCOUNTER
Date of last visit:  10/18/2021  Date of next visit:  4/20/2022    Requested Prescriptions     Pending Prescriptions Disp Refills    losartan (COZAAR) 100 MG tablet [Pharmacy Med Name: LOSARTAN TABS 100MG] 90 tablet 3     Sig: TAKE 1 TABLET DAILY

## 2022-04-12 ENCOUNTER — TELEPHONE (OUTPATIENT)
Dept: UROLOGY | Age: 72
End: 2022-04-12

## 2022-04-12 RX ORDER — LOSARTAN POTASSIUM 100 MG/1
TABLET ORAL
Qty: 90 TABLET | Refills: 2 | Status: SHIPPED | OUTPATIENT
Start: 2022-04-12

## 2022-04-12 NOTE — TELEPHONE ENCOUNTER
Patient urine was clear but does notice occasionally pink tinge urine. He denies clots, fever, chills, or pain. He restarted aspirin 81 mg daily. He has history of bypass surgery. He on drinks 2-3 bottles of water daily. He underwent Cystoscopy right retrograde pyelogram  Right ureteral stent exchange  Robotic assisted right ureteral reimplantation 04/04/2022 with Dr Jostin Hilton. He has a follow up on Monday 04/18/2022. The catheter did get tugged on a little bit today. Patient advised to increase his fluid intake, if the catheter becomes occluded to present to the urgent care or ER, and if catheter was tugged it could of caused the hematuria. If he develops any other problems or concerns, he will call the office.

## 2022-04-14 ENCOUNTER — CARE COORDINATION (OUTPATIENT)
Dept: FAMILY MEDICINE CLINIC | Age: 72
End: 2022-04-14

## 2022-04-14 NOTE — CARE COORDINATION
I spoke with Cecilia Arrington and he is doing well. He has increased his fluid intake as instructed by urology. He still has the ortiz catheter and it is doing ok. He has no questions about that. He will call if he has any questions and I will follow up with him next week.

## 2022-04-18 DIAGNOSIS — N13.5 URETER, STRICTURE: Primary | ICD-10-CM

## 2022-04-18 DIAGNOSIS — N40.1 BPH WITH OBSTRUCTION/LOWER URINARY TRACT SYMPTOMS: ICD-10-CM

## 2022-04-18 DIAGNOSIS — N13.8 BPH WITH OBSTRUCTION/LOWER URINARY TRACT SYMPTOMS: ICD-10-CM

## 2022-04-19 ENCOUNTER — TELEPHONE (OUTPATIENT)
Dept: UROLOGY | Age: 72
End: 2022-04-19

## 2022-04-19 RX ORDER — SULFAMETHOXAZOLE AND TRIMETHOPRIM 800; 160 MG/1; MG/1
1 TABLET ORAL 2 TIMES DAILY
Qty: 14 TABLET | Refills: 0 | Status: SHIPPED | OUTPATIENT
Start: 2022-04-19 | End: 2022-04-26

## 2022-04-19 NOTE — TELEPHONE ENCOUNTER
Patient advised Bactrim was sent to the pharmacy and confirmed appoint for cystogram and office visit.

## 2022-04-19 NOTE — TELEPHONE ENCOUNTER
Patient c/o pain and burning when urine passing through the catheter. The urine is yellow without foul odor. He gets warm when he has that burning sensation and pain. The output is good. He denies fever or chills. Patient underwent Cystoscopy right retrograde pyelogram  Right ureteral stent exchange  Robotic assisted right ureteral reimplantation on 04/04/2022 with Dr Shane Gaviria. He is scheduled for cystogram on 04/21/2022 and follow up that day for possible ortiz removal.    He does not know how to collect a urine from the catheter and he does not have antibiotics ordered prior to ortiz removal.    Please advise.  Thank you

## 2022-04-20 ENCOUNTER — CARE COORDINATION (OUTPATIENT)
Dept: FAMILY MEDICINE CLINIC | Age: 72
End: 2022-04-20

## 2022-04-20 ENCOUNTER — TELEPHONE (OUTPATIENT)
Dept: CARDIOLOGY CLINIC | Age: 72
End: 2022-04-20

## 2022-04-20 ENCOUNTER — OFFICE VISIT (OUTPATIENT)
Dept: FAMILY MEDICINE CLINIC | Age: 72
End: 2022-04-20

## 2022-04-20 VITALS
DIASTOLIC BLOOD PRESSURE: 82 MMHG | BODY MASS INDEX: 34.21 KG/M2 | RESPIRATION RATE: 16 BRPM | WEIGHT: 244.38 LBS | SYSTOLIC BLOOD PRESSURE: 160 MMHG | HEART RATE: 84 BPM | HEIGHT: 71 IN

## 2022-04-20 DIAGNOSIS — E78.5 HYPERLIPIDEMIA, UNSPECIFIED HYPERLIPIDEMIA TYPE: ICD-10-CM

## 2022-04-20 DIAGNOSIS — I25.810 CORONARY ARTERY DISEASE INVOLVING CORONARY BYPASS GRAFT OF NATIVE HEART WITHOUT ANGINA PECTORIS: ICD-10-CM

## 2022-04-20 DIAGNOSIS — I10 PRIMARY HYPERTENSION: Primary | ICD-10-CM

## 2022-04-20 DIAGNOSIS — Z09 HOSPITAL DISCHARGE FOLLOW-UP: ICD-10-CM

## 2022-04-20 DIAGNOSIS — N13.5 URETERAL STENOSIS, RIGHT: ICD-10-CM

## 2022-04-20 PROCEDURE — 99214 OFFICE O/P EST MOD 30 MIN: CPT | Performed by: FAMILY MEDICINE

## 2022-04-20 RX ORDER — HYDROCHLOROTHIAZIDE 25 MG/1
12.5 TABLET ORAL DAILY
Qty: 90 TABLET | Refills: 1 | Status: SHIPPED | OUTPATIENT
Start: 2022-04-20 | End: 2022-10-31

## 2022-04-20 NOTE — PROGRESS NOTES
Post-Discharge Transitional Care  Follow Up      Roshan Franklin   YOB: 1950    Date of Office Visit:  4/20/2022  Date of Hospital Admission: 4/4/22  Date of Hospital Discharge: 4/5/22  Risk of hospital readmission (high >=14%. Medium >=10%) :No data recorded    Care management risk score Rising risk (score 2-5) and Complex Care (Scores >=6): 1     Non face to face  following discharge, date last encounter closed (first attempt may have been earlier): 4/12/2022  9:15 AM    Call initiated 2 business days of discharge: Yes    ASSESSMENT/PLAN:      Impression       Diagnosis Orders   1. Primary hypertension     2. Ureteral stenosis, right     3. Coronary artery disease involving coronary bypass graft of native heart without angina pectoris     4.  Hyperlipidemia, unspecified hyperlipidemia type       PLAN      Current Outpatient Medications   Medication Sig Dispense Refill    hydroCHLOROthiazide (HYDRODIURIL) 25 MG tablet Take 0.5 tablets by mouth daily 90 tablet 1    sulfamethoxazole-trimethoprim (BACTRIM DS;SEPTRA DS) 800-160 MG per tablet Take 1 tablet by mouth 2 times daily for 7 days 14 tablet 0    losartan (COZAAR) 100 MG tablet TAKE 1 TABLET DAILY 90 tablet 2    bisacodyl (DULCOLAX) 5 MG EC tablet Take 1 tablet by mouth daily as needed for Constipation 30 tablet 0    sennosides-docusate sodium (SENOKOT-S) 8.6-50 MG tablet Take 1 tablet by mouth 2 times daily 30 tablet 0    atorvastatin (LIPITOR) 40 MG tablet TAKE 1 TABLET DAILY 90 tablet 3    metoprolol tartrate (LOPRESSOR) 25 MG tablet TAKE 1 TABLET TWICE A  tablet 3    vitamin D (ERGOCALCIFEROL) 1.25 MG (93998 UT) CAPS capsule TAKE 1 CAPSULE ONCE A WEEK 12 capsule 3    aspirin 81 MG EC tablet Take 81 mg by mouth 2 times daily       acetaminophen (TYLENOL ARTHRITIS PAIN) 650 MG extended release tablet Take 650 mg by mouth every 8 hours as needed for Pain      sodium chloride (OCEAN, BABY AYR) 0.65 % nasal spray 1 spray by Nasal route as needed for Congestion      Multiple Vitamin (MULTI-VITAMIN) TABS Take by mouth daily        No current facility-administered medications for this visit. Orders Placed This Encounter   Procedures   Nicolasa Badillo MD, Cardiology, [facility]     Referral Priority:   Routine     Referral Type:   Eval and Treat     Referral Reason:   Specialty Services Required     Referred to Provider:   Audrey Abarca MD     Requested Specialty:   Cardiology     Number of Visits Requested:   1       bp  Checks and  See in  4  mths a s  To see  Cardio        hctz  12.5  Mg  Add   Or  1/2  Tab   Subjective:   HPI:  Follow up of Hospital problems/diagnosis(es):  Post  Stent  As  Right ureter  With  Constriction a dn  Removal  Of  Ureteral        ashd   Stable     htn  Stable     Inpatient course: Discharge summary reviewed- see chart. Interval history/Current status:   Stent  For  Right  Ureteral constriction and  ? Form  Kidney  Stone      Removal of the  Ureter and  reimplant   Patient Active Problem List   Diagnosis    CAD (coronary artery disease)    Hypertension    Hyperlipidemia    S/P CABG x 6,2007    CAD (coronary artery disease)    Colon polyps    Ureteral stenosis, right    Ureteral stricture       Medications listed as ordered at the time of discharge from hospital     Medication List          Accurate as of April 20, 2022 11:01 AM. If you have any questions, ask your nurse or doctor.             CONTINUE taking these medications    aspirin 81 MG EC tablet     atorvastatin 40 MG tablet  Commonly known as: LIPITOR  TAKE 1 TABLET DAILY     bisacodyl 5 MG EC tablet  Commonly known as: DULCOLAX  Take 1 tablet by mouth daily as needed for Constipation     losartan 100 MG tablet  Commonly known as: COZAAR  TAKE 1 TABLET DAILY     metoprolol tartrate 25 MG tablet  Commonly known as: LOPRESSOR  TAKE 1 TABLET TWICE A DAY     Multi-Vitamin Tabs     sennosides-docusate sodium 8.6-50 MG tablet  Commonly known as: SENOKOT-S  Take 1 tablet by mouth 2 times daily     sodium chloride 0.65 % nasal spray  Commonly known as: OCEAN, BABY AYR     sulfamethoxazole-trimethoprim 800-160 MG per tablet  Commonly known as: BACTRIM DS;SEPTRA DS  Take 1 tablet by mouth 2 times daily for 7 days     Tylenol Arthritis Pain 650 MG extended release tablet  Generic drug: acetaminophen     vitamin D 1.25 MG (43482 UT) Caps capsule  Commonly known as: ERGOCALCIFEROL  TAKE 1 CAPSULE ONCE A WEEK              Medications marked \"taking\" at this time  Outpatient Medications Marked as Taking for the 4/20/22 encounter (Office Visit) with Jeanna Luevano MD   Medication Sig Dispense Refill    sulfamethoxazole-trimethoprim (BACTRIM DS;SEPTRA DS) 800-160 MG per tablet Take 1 tablet by mouth 2 times daily for 7 days 14 tablet 0    losartan (COZAAR) 100 MG tablet TAKE 1 TABLET DAILY 90 tablet 2    bisacodyl (DULCOLAX) 5 MG EC tablet Take 1 tablet by mouth daily as needed for Constipation 30 tablet 0    sennosides-docusate sodium (SENOKOT-S) 8.6-50 MG tablet Take 1 tablet by mouth 2 times daily 30 tablet 0    atorvastatin (LIPITOR) 40 MG tablet TAKE 1 TABLET DAILY 90 tablet 3    metoprolol tartrate (LOPRESSOR) 25 MG tablet TAKE 1 TABLET TWICE A  tablet 3    vitamin D (ERGOCALCIFEROL) 1.25 MG (85324 UT) CAPS capsule TAKE 1 CAPSULE ONCE A WEEK 12 capsule 3    aspirin 81 MG EC tablet Take 81 mg by mouth 2 times daily       acetaminophen (TYLENOL ARTHRITIS PAIN) 650 MG extended release tablet Take 650 mg by mouth every 8 hours as needed for Pain      sodium chloride (OCEAN, BABY AYR) 0.65 % nasal spray 1 spray by Nasal route as needed for Congestion      Multiple Vitamin (MULTI-VITAMIN) TABS Take by mouth daily           Medications patient taking as of now reconciled against medications ordered at time of hospital discharge: Yes    A comprehensive review of systems was negative.      Objective:    BP (!) 160/82 (Site: Right Upper Arm, Position: Sitting, Cuff Size: Medium Adult)   Pulse 84   Resp 16   Ht 5' 11\" (1.803 m)   Wt 244 lb 6 oz (110.8 kg)   BMI 34.08 kg/m²       heent  wnl     lungs    Clear       cardio   rrr  And  Nl s1 and  s2   No  Leg  Edema      abd  Soft  positive  bs     no  Pain      With  Jackson      neuro  All  wnl  Cn  2  To 12 and  Reflexes  Ok     An electronic signature was used to authenticate this note.   --Fercho Jeffries MD

## 2022-04-20 NOTE — CARE COORDINATION
Chichoirvin Rosas is doing OK. I saw him when he came in for his follow up appointment today. He was hoping to get his catheter out on Monday but he has to wait as the urologist believes he has an infection and put him on an antibiotic for seven days. He is hoping it can come out on his next visit. He is having some pain and burning at times with urination. His wife is taking care of him so they have no additional care needs at this time. I will follow up with him next week.

## 2022-04-20 NOTE — TELEPHONE ENCOUNTER
LM for patient to call office back. Received new patient referral in [facility] for CAD.

## 2022-04-21 ENCOUNTER — TELEPHONE (OUTPATIENT)
Dept: UROLOGY | Age: 72
End: 2022-04-21

## 2022-04-21 ENCOUNTER — OFFICE VISIT (OUTPATIENT)
Dept: UROLOGY | Age: 72
End: 2022-04-21
Payer: MEDICARE

## 2022-04-21 ENCOUNTER — HOSPITAL ENCOUNTER (OUTPATIENT)
Dept: INTERVENTIONAL RADIOLOGY/VASCULAR | Age: 72
Discharge: HOME | End: 2022-04-21
Payer: MEDICARE

## 2022-04-21 VITALS
BODY MASS INDEX: 33.74 KG/M2 | SYSTOLIC BLOOD PRESSURE: 132 MMHG | HEIGHT: 71 IN | WEIGHT: 241 LBS | DIASTOLIC BLOOD PRESSURE: 62 MMHG

## 2022-04-21 DIAGNOSIS — N13.5 URETER, STRICTURE: ICD-10-CM

## 2022-04-21 DIAGNOSIS — N40.1 BPH WITH OBSTRUCTION/LOWER URINARY TRACT SYMPTOMS: ICD-10-CM

## 2022-04-21 DIAGNOSIS — N13.5 URETER, STRICTURE: Primary | ICD-10-CM

## 2022-04-21 DIAGNOSIS — Z01.818 PRE-OP TESTING: ICD-10-CM

## 2022-04-21 DIAGNOSIS — R30.0 DYSURIA: Primary | ICD-10-CM

## 2022-04-21 DIAGNOSIS — N13.8 BPH WITH OBSTRUCTION/LOWER URINARY TRACT SYMPTOMS: ICD-10-CM

## 2022-04-21 PROCEDURE — 51600 INJECTION FOR BLADDER X-RAY: CPT

## 2022-04-21 PROCEDURE — 99213 OFFICE O/P EST LOW 20 MIN: CPT | Performed by: PHYSICIAN ASSISTANT

## 2022-04-21 PROCEDURE — 36901 INTRO CATH DIALYSIS CIRCUIT: CPT

## 2022-04-21 PROCEDURE — 74430 CONTRAST X-RAY BLADDER: CPT

## 2022-04-21 PROCEDURE — 6360000004 HC RX CONTRAST MEDICATION: Performed by: UROLOGY

## 2022-04-21 RX ADMIN — IOTHALAMATE MEGLUMINE 250 ML: 172 INJECTION URETERAL at 08:25

## 2022-04-21 NOTE — TELEPHONE ENCOUNTER
DO NOT TAKE ASPIRIN,  FISH OIL, COUMADIN, IBUPROFEN, MOTRIN-LIKE DRUGS AND ANY MULTIVITAMINS OR OVER THE COUNTER SUPPLEMENTS 14 DAYS PRIOR TO SURGERY. MUST HAVE AN ADULT OVER THE AGE OF 18 WITH YOU AT THE TIME OF THE PROCEDURE AND WITH YOU AT HOME AFTER THE PROCEDURE FOR 24 HOURS       Peter Plaza Tomasz 1950 Diagnosis:     Surgical Physician: Dr. Qamar Michelle have been scheduled for the procedure marked below:      Surgery: Cystoscopy with Right Retrograde Pyelogram with Right Ureteral Stent Removal         Date: 6/6/2022     Anesthesia: Anesthesiologist (General/Spinal)     Place of Service: [facility] Second Floor Same Day Surgery         Arrive to same day surgery by:  6:30am  (Surgery time is subject to change)      INSTRUCTIONS AS MARKED BELOW:    1.  DO NOT eat or drink anything after midnight before surgery. 2.  We prefer you shower or bathe with an antibacterial soap (Dial) the morning of surgery. 3.  Please ensure to have a  with you to transport you home. 4.  Please bring a current medication list, photo ID and insurance card(s) with you  5. Okay to take Tylenol  6. If you take Glucophage, Metformin or Janumet, hold 48-hours prior to surgery  7. Take blood pressure or heart medication as directed, if taken in the morning take with a small sip of water  8. The office will call you in 1-2 days after your procedure to schedule a follow up. DATE SENSITIVE TESTING-DO ON THE DATE LISTED*WALK IN *NO APPOINTMENT    DO URINE CULTURE ON 5/23/2022. ORDERS GIVEN TO PATIENT.         Date: 4/21/2022

## 2022-04-21 NOTE — TELEPHONE ENCOUNTER
Patient is scheduled with Dr. Baron Boyd on 6/6/2022. Surgery consent to be done upon arrival.  Dr. Brendan Gunter to clear (patient seeing Dr. Brendan Gunter as NP on 4/28/2022). Patient will have an adult over the age of 25 with them at discharge and 24 hours after procedure. Patient to do urine culture on 5/23/2022. Surgery instructions gone over verbally with patient, copy given to patient along with orders for urine culture.

## 2022-04-21 NOTE — PROGRESS NOTES
[place] [address] [place] [address]  Dept: [phone]  Loc: [phone]      Mr. Nella Alvarez was seen in follow up for   Chief Complaint   Patient presents with    Post-Op Check     Cystoscopy right retrograde pyelogram, Right ureteral stent exchange,Robotic assisted right ureteral reimplantation, (antirefluxing lich vani technique     Results     review cystogram         HPI:  Mr. Nella Alvarez is a 41-year-old status post Robotic Assisted Laparoscopic Right Ureteral Reimplantation, Cystoscopy with Right Retrograde Pyelogram, and Right Ureteral Stent Exchange under the auspices of Dr. Tom Mayer on 4/4/22. He reports that he is doing relatively well post-op. His ortiz catheter has been draining well with yellow urine. He does not experience many bladder spasms and overall the catheter has been relatively comfortable. No gross hematuria or dysuria at this time. In regards to his general health, he denies dyspnea, chest pain, N/V, leg pain, or lightheadedness. He is passing flatus and having bowel movements. Tolerating a regular diet and ambulating without difficulty. He presents today to have his catheter removed and for further evaluation. Past Medical History:   Diagnosis Date    Arthritis     ASHD (arteriosclerotic heart disease)     CAD (coronary artery disease)     Status post bypass    Colon polyps 2009      colonoscopy  tubular  adenoma   [facility]    Decreased sense of smell 5/22/2015    Epistaxis 5/22/2015    History of blood transfusion 2005    ? ???? with by pass? ??    Hyperlipidemia     Hypertension     Kidney stones     Mild carotid artery disease ([facility] [place] [address] 2013    Nasal obstruction 5/22/2015    Nasal septal perforation 5/22/2015       Past Surgical History:   Procedure Laterality Date    BLADDER SURGERY Right 12/20/2021    CYSTO, URETEROSCOPY, RIGHT RETROGRADE PYELOGRAM, URETERAL DILATION, WITH URETERAL BX RIGHT URETERAL STENT PLACEMENT performed by Brandin Mercado MD at [facility] Right 1/31/2022    CYSTOSCOPY RIGHT RETROGRADE PYELOGRAM, RIGHT STENT EXCHANGE performed by Brandin Mercado MD at [facility] Right 4/4/2022    CYSTOSCOPY, RIGHT STENT EXCHANGE, RIGHT RETROGRADE PYELOGRAM, ROBOTIC RIGHT URETERAL REIMPLANT performed by Brandin Mercado MD at [address]  2005    6 by pass    COLONOSCOPY  12/2019    Dr Stevo Shelton  colon polyps 8//2014 9-2019    COLONOSCOPY Left 09/13/2019    COLONOSCOPY POLYPECTOMY SNARE/COLD BIOPSY performed by Carola Santo MD at [address]  11/2005    EXCISION OF AURAL MASS      NASAL POLYP SURGERY  06/05/2015    BIOPSY OF NASAL POLYP MIDDLE TURBINATE AND BIOPSY SEPTAL PERFORTATION    SKIN CANCER EXCISION  2018    on back    TONSILLECTOMY      as a child       Current Outpatient Medications on File Prior to Visit   Medication Sig Dispense Refill    hydroCHLOROthiazide (HYDRODIURIL) 25 MG tablet Take 0.5 tablets by mouth daily 90 tablet 1    sulfamethoxazole-trimethoprim (BACTRIM DS;SEPTRA DS) 800-160 MG per tablet Take 1 tablet by mouth 2 times daily for 7 days 14 tablet 0    losartan (COZAAR) 100 MG tablet TAKE 1 TABLET DAILY 90 tablet 2    bisacodyl (DULCOLAX) 5 MG EC tablet Take 1 tablet by mouth daily as needed for Constipation 30 tablet 0    sennosides-docusate sodium (SENOKOT-S) 8.6-50 MG tablet Take 1 tablet by mouth 2 times daily 30 tablet 0    atorvastatin (LIPITOR) 40 MG tablet TAKE 1 TABLET DAILY 90 tablet 3    metoprolol tartrate (LOPRESSOR) 25 MG tablet TAKE 1 TABLET TWICE A  tablet 3    vitamin D (ERGOCALCIFEROL) 1.25 MG (01266 UT) CAPS capsule TAKE 1 CAPSULE ONCE A WEEK 12 capsule 3    aspirin 81 MG EC tablet Take 81 mg by mouth 2 times daily       acetaminophen (TYLENOL ARTHRITIS PAIN) 650 MG extended release tablet Take 650 mg by mouth every 8 hours as needed for Pain      sodium chloride (OCEAN, BABY AYR) 0.65 % nasal spray 1 spray by Nasal route as needed for Congestion      Multiple Vitamin (MULTI-VITAMIN) TABS Take by mouth daily        No current facility-administered medications on file prior to visit. No Known Allergies    Family History   Problem Relation Age of Onset    Heart Disease Mother     Cancer Father         throat    Diabetes Sister     Asthma Brother        Social History     Socioeconomic History    Marital status:      Spouse name: Not on file    Number of children: Not on file    Years of education: Not on file    Highest education level: Not on file   Occupational History    Not on file   Tobacco Use    Smoking status: Former Smoker     Packs/day: 1.50     Years: 30.00     Pack years: 45.00     Types: Cigarettes     Quit date: 6/3/2002     Years since quittin.9    Smokeless tobacco: Never Used   Vaping Use    Vaping Use: Never used   Substance and Sexual Activity    Alcohol use: Yes     Comment: seldom    Drug use: No    Sexual activity: Not Currently     Partners: Female   Other Topics Concern    Not on file   Social History Narrative    Not on file     Social Determinants of Health     Financial Resource Strain: Low Risk     Difficulty of Paying Living Expenses: Not hard at all   Food Insecurity: No Food Insecurity    Worried About [facility] in the Last Year: Never true    [facility] in the Last Year: Never true   Transportation Needs:     Lack of Transportation (Medical): Not on file    Lack of Transportation (Non-Medical):  Not on file   Physical Activity:     Days of Exercise per Week: Not on file    Minutes of Exercise per Session: Not on file   Stress:     Feeling of Stress : Not on file   Social Connections:     Frequency of Communication with Friends and Family: Not on file    Frequency of Social Gatherings with Friends and Family: Not on file    Attends Sabianist Services: Not on file    Active Member of Clubs or Organizations: Not on file    Attends Club or Organization Meetings: Not on file    Marital Status: Not on file   Intimate Partner Violence:     Fear of Current or Ex-Partner: Not on file    Emotionally Abused: Not on file    Physically Abused: Not on file    Sexually Abused: Not on file   Housing Stability:     Unable to Pay for Housing in the Last Year: Not on file    Number of [place] in the Last Year: Not on file    Unstable Housing in the Last Year: Not on file       Review of Systems  Constitutional: Negative for fatigue, fever and unexpected weight change. HENT: Negative for congestion and trouble swallowing. Eyes: Negative for pain and itching. Respiratory: Negative for cough and shortness of breath. Cardiovascular: Negative for chest pain and leg swelling. Gastrointestinal: Negative for abdominal pain, constipation, diarrhea and nausea. Endocrine: Negative for cold intolerance and heat intolerance. Genitourinary: See HPI. Musculoskeletal:  Negative for back pain and joint swelling. Skin: Negative for rash. Neurological: Negative for dizziness, weakness, numbness and headaches. Psychiatric/Behavioral: The patient is not nervous/anxious. Exam    /62   Ht 5' 11\" (1.803 m)   Wt 241 lb (109.3 kg)   BMI 33.61 kg/m²     Constitutional: Oriented to person, place, and time. Vital signs are normal. Appears well-developed and well-nourished. Cooperative. No distress. HENT:    Head: Normocephalic and atraumatic. Eyes: EOM are normal. Pupils are equal, round, and reactive to light. Right eye exhibits no discharge. Left eye exhibits no discharge. No scleral icterus. Neck: Trachea normal. No JVD present. Cardiovascular: Normal rate and regular rhythm. S1/S2. Pulmonary/Chest: Effort normal. No respiratory distress. no wheezes. Abdominal: Soft. Exhibits no distension or general tenderness.  There is no rebound and no CVA tenderness. Musculoskeletal: No edema or calf tenderness. Lymphadenopathy:   Right: No supraclavicular adenopathy present. Left: No supraclavicular adenopathy present. Neurological: Alert and oriented to person, place, and time. No cranial nerve deficit. Skin: Skin is warm and dry. Not diaphoretic. Psychiatric: Normal mood and affect. Behavior is normal.   Nursing note and vitals reviewed. Labs    No results found for this visit on 04/21/22. Lab Results   Component Value Date    CREATININE 0.9 04/05/2022    BUN 15 04/05/2022     04/05/2022    K 4.4 04/05/2022     04/05/2022    CO2 18 (L) 04/05/2022       Lab Results   Component Value Date    PSA 5.11 (H) 12/17/2021    PSA 3.89 (H) 07/10/2019    PSA 4.18 (H) 05/17/2018         Assessment/Plan:    1. Right Ureteral Stricture- Status post Robotic Assisted Laparoscopic Right Ureteral Reimplantation, Cystoscopy with Right Retrograde Pyelogram, and Right Ureteral Stent Exchange under the auspices of Dr. Siddharth Palma on 4/4/22. Cystogram negative. Will remove catheter today. Patient started Bactrim DS yesterday. Return in six weeks for a cystoscopy with right retrograde pyelogram, right ureteral stent removal.     2. Elevated PSA- PSA in 12/21 is 5. 11. It was 3.89 in 2019. Recommend MRI of prostate with and without contrast for further evaluation and/or Exosome Dx testing. Will discuss further at next appointment.

## 2022-04-21 NOTE — TELEPHONE ENCOUNTER
Patient is scheduled for a Cystoscopy, Right Retrograde Pyelogram with right ureteral stent removal with Dr. Stanislaw Dave on 6/6/2022. Patient has a new patient appointment with Dr. Vanessa Price on 4/28/2022. We are requesting clearance from Dr. Vanessa Price.   Thank you

## 2022-04-21 NOTE — TELEPHONE ENCOUNTER
SURGERY [address] [place] [address] [address] [place] [address] [facility], [address]      Phone *[phone] *[phone]   Surgical Scheduling Direct Line Phone *[phone] Fax *[phone]      Jay Sheth 1950 male    [address]  [address] [facility] [id number]  Marital Status:          Home Phone: [phone]      Cell Phone:    Telephone Information:   Mobile [phone]          Surgeon: Dr. Lynn [facility] Surgery Date: 6/6/2022   Time: 8:30am    Procedure: Cystoscopy, Right Retrograde Pyelogram with Right Ureteral Stent Removal    Diagnosis: Dysuria     Important Medical History:  In Epic    Special Inst/Equip:     CPT Codes:    90861,05497  Latex Allergy: No     Cardiac Device:  No    Anesthesia:  General          Admission Type:  Same Day                        Admit Prior to Day of Surgery: No    Case Location:  Main OR            Preadmission Testing:  Phone Call          PAT Date and Time:______________________________________________________    PAT Confirmation #: ______________________________________________________    Post Op Visit: ___________________________________________________________    Need Preop Cardiac Clearance: Yes    Does Patient have Cardiologist/physician?      Dr. Rocío Negron    Surgery Confirmation #: __________________________________________________    Cecilia Peña: ________________________   Date: __________________________     Insurance Company Name: Lavonda Boxer

## 2022-04-26 ENCOUNTER — NURSE ONLY (OUTPATIENT)
Dept: UROLOGY | Age: 72
End: 2022-04-26
Payer: MEDICARE

## 2022-04-26 ENCOUNTER — PATIENT MESSAGE (OUTPATIENT)
Dept: UROLOGY | Age: 72
End: 2022-04-26

## 2022-04-26 DIAGNOSIS — R39.198 DIFFICULTY URINATING: Primary | ICD-10-CM

## 2022-04-26 LAB
BILIRUBIN URINE: NEGATIVE
BLOOD URINE, POC: ABNORMAL
CHARACTER, URINE: CLEAR
COLOR, URINE: YELLOW
GLUCOSE URINE: NEGATIVE MG/DL
KETONES, URINE: NEGATIVE
LEUKOCYTE CLUMPS, URINE: ABNORMAL
NITRITE, URINE: NEGATIVE
PH, URINE: 5.5 (ref 5–9)
POST VOID RESIDUAL (PVR): 803 ML
PROTEIN, URINE: 30 MG/DL
SPECIFIC GRAVITY, URINE: 1.02 (ref 1–1.03)
UROBILINOGEN, URINE: 0.2 EU/DL (ref 0–1)

## 2022-04-26 PROCEDURE — 51702 INSERT TEMP BLADDER CATH: CPT | Performed by: NURSE PRACTITIONER

## 2022-04-26 PROCEDURE — 51798 US URINE CAPACITY MEASURE: CPT | Performed by: NURSE PRACTITIONER

## 2022-04-26 PROCEDURE — 81003 URINALYSIS AUTO W/O SCOPE: CPT | Performed by: NURSE PRACTITIONER

## 2022-04-26 RX ORDER — TAMSULOSIN HYDROCHLORIDE 0.4 MG/1
0.4 CAPSULE ORAL DAILY
Qty: 30 CAPSULE | Refills: 2 | Status: SHIPPED | OUTPATIENT
Start: 2022-04-26 | End: 2022-07-13

## 2022-04-26 NOTE — PROGRESS NOTES
I have personally verified, reviewed, released, signed, authenticated, authorized, confirmed,finalized, and approved the actions of the [facility]. Pt s/p cystoscopy, right retrograde pyelogram, right ureteral stent exchange, and RAL R ureteral reimplantation (antirefluxing lich vani technique) by Dr. Paloma Coello 4/4/22. Scheduled for cystoscopy, right retrograde pyelogram, and R ureteral stent removal on 6/6/22 with Dr. Paloma Coello. Catheter removed in the office at appt 4/23/22. Now urinating small amounts every 1-1.5 hours. Here today for UA and PVR check. PVR elevated to 803 mls. Start Flomax. Cysto note 12/20/21 notes prostate \"large and obstructing (80 gm) with large median lobe (appropriate for greenlight PVP)\". Void trial in 1 week. OV with Dr. Paloma Coello 2 weeks.

## 2022-04-26 NOTE — TELEPHONE ENCOUNTER
Patient feels like his bladder is not emptying out. After speaking to Venessa Trinh CNP and Peewee Calloway, lab visit scheduled for PVR UA and culture.

## 2022-04-26 NOTE — TELEPHONE ENCOUNTER
From: Filipe Torres  To: Ketan Gil: 4/26/2022 10:35 AM EDT  Subject: Urination    I got my catheter out Thursday and am going to the restroom every hour/hour and half with little output and urgency. I am drinking around 60 ounces of water.    I am wondering if this is normal.

## 2022-04-26 NOTE — PROGRESS NOTES
Pt had ortiz removed on 4-21-22 post operatively in our office. Pt c/o difficulty urinating and frequency. UA ran today. PVR was 803cc. Not on flomax currently. Per [employer] APRN ortiz placed. Follow up 5/9 with Dr Jonny Masters to discuss possible greenlight? Patient has given me verbal consent to perform catheter placement  Yes    Does patient have latex allergy? No  Does patient have shellfish or betadine allergy? No      Following Dr. Monico Chandler of care. Inserted straight 16Fr  Catheter without difficulty. Patient's urethra was cleansed with betadine swab. 16 Fr Coude ortiz was inserted using sterile water-soluble lubricant without difficulty and inflated balloon with 10 ml of water. Ortiz Catheter was hooked up to leg bag with straps. Foreskin reduced back down? Yes    Patient instructed on draining catheter bags. Patient instructed to keep leg bag above the knee to prevent pulling on catheter causing blood. Patient instructed on how to switch from leg bag to overnight bag.

## 2022-04-28 ENCOUNTER — OFFICE VISIT (OUTPATIENT)
Dept: CARDIOLOGY CLINIC | Age: 72
End: 2022-04-28
Payer: MEDICARE

## 2022-04-28 VITALS
DIASTOLIC BLOOD PRESSURE: 60 MMHG | WEIGHT: 235 LBS | HEIGHT: 71 IN | SYSTOLIC BLOOD PRESSURE: 152 MMHG | HEART RATE: 98 BPM | BODY MASS INDEX: 32.9 KG/M2

## 2022-04-28 DIAGNOSIS — I10 PRIMARY HYPERTENSION: ICD-10-CM

## 2022-04-28 DIAGNOSIS — R06.02 SOB (SHORTNESS OF BREATH): ICD-10-CM

## 2022-04-28 DIAGNOSIS — Z01.818 PRE-OPERATIVE CLEARANCE: Primary | ICD-10-CM

## 2022-04-28 DIAGNOSIS — I25.810 CORONARY ARTERY DISEASE INVOLVING CORONARY BYPASS GRAFT OF NATIVE HEART WITHOUT ANGINA PECTORIS: ICD-10-CM

## 2022-04-28 DIAGNOSIS — E78.01 FAMILIAL HYPERCHOLESTEROLEMIA: ICD-10-CM

## 2022-04-28 LAB
ORGANISM: ABNORMAL
URINE CULTURE, ROUTINE: ABNORMAL

## 2022-04-28 PROCEDURE — 93000 ELECTROCARDIOGRAM COMPLETE: CPT | Performed by: NUCLEAR MEDICINE

## 2022-04-28 PROCEDURE — 99204 OFFICE O/P NEW MOD 45 MIN: CPT | Performed by: NUCLEAR MEDICINE

## 2022-04-28 ASSESSMENT — ENCOUNTER SYMPTOMS
NAUSEA: 0
BLOOD IN STOOL: 0
ABDOMINAL DISTENTION: 0
BACK PAIN: 0
COLOR CHANGE: 0
VOMITING: 0
RECTAL PAIN: 0
DIARRHEA: 0
CHEST TIGHTNESS: 0
SHORTNESS OF BREATH: 1
CONSTIPATION: 0
ABDOMINAL PAIN: 0
ANAL BLEEDING: 0

## 2022-04-28 NOTE — PROGRESS NOTES
[facility] [address] [place] [address] [address]  [facility] [address]  Dept: [phone]  Dept Fax: [phone]  Loc: [phone]    Visit Date: 4/28/2022    Raul Kelly is a 66 y.o. male who presents todayfor:  Chief Complaint   Patient presents with    Hypertension    Hyperlipidemia     Here for the first time  Going for cysto and stent removal   Had multiple surgeries so far for some dysfunctional ureter   Still need some more   Known CABG 2005   Not following with cardiology since then   No cath or stents since then   Known HTN and hyperlipidemia  Does have some dyspnea  Dyspnea on exertion   No chest pain reported   Some limitation   No recent stress test   HTN is under control   On statins for hyperlipidemia  No smoking  Family history of CAD     HPI:  HPI  Past Medical History:   Diagnosis Date    Arthritis     ASHD (arteriosclerotic heart disease)     CAD (coronary artery disease)     Status post bypass    Colon polyps 2009      colonoscopy  tubular  adenoma   rinesmith    Decreased sense of smell 5/22/2015    Epistaxis 5/22/2015    History of blood transfusion 2005    ? ???? with by pass? ??    Hyperlipidemia     Hypertension     Kidney stones     Mild carotid artery disease ([facility] [address] 2013    Nasal obstruction 5/22/2015    Nasal septal perforation 5/22/2015      Past Surgical History:   Procedure Laterality Date    BLADDER SURGERY Right 12/20/2021    CYSTO, URETEROSCOPY, RIGHT RETROGRADE PYELOGRAM, URETERAL DILATION, WITH URETERAL BX RIGHT URETERAL STENT PLACEMENT performed by Shaneka Porter MD at [address] [place] [address],[address] [place] [address] Right 1/31/2022    CYSTOSCOPY RIGHT RETROGRADE PYELOGRAM, RIGHT STENT EXCHANGE performed by Shaneka Porter MD at [address] [place] [address],[facility] Floor Right 4/4/2022    CYSTOSCOPY, RIGHT STENT EXCHANGE, RIGHT RETROGRADE PYELOGRAM, ROBOTIC RIGHT URETERAL REIMPLANT performed by Shaneka Porter MD at [address] [place] [address] CARDIAC SURGERY  2005    6 by pass    COLONOSCOPY  2019    Dr Pascale Geller  colon polyps 2014-    COLONOSCOPY Left 2019    COLONOSCOPY POLYPECTOMY SNARE/COLD BIOPSY performed by Ivon Prieto MD at [address]  2005    EXCISION OF AURAL MASS      NASAL POLYP SURGERY  2015    BIOPSY OF NASAL POLYP MIDDLE TURBINATE AND BIOPSY SEPTAL [address] [facility] [address]    SKIN CANCER EXCISION  2018    on back    TONSILLECTOMY      as a child     Family History   Problem Relation Age of Onset    Heart Disease Mother     Cancer Father         throat    Diabetes Sister     Asthma Brother      Social History     Tobacco Use    Smoking status: Former Smoker     Packs/day: 1.50     Years: 30.00     Pack years: 45.00     Types: Cigarettes     Quit date: 6/3/2002     Years since quittin.9    Smokeless tobacco: Never Used   Substance Use Topics    Alcohol use: Yes     Comment: seldom      Current Outpatient Medications   Medication Sig Dispense Refill    tamsulosin (FLOMAX) 0.4 MG capsule Take 1 capsule by mouth daily 30 capsule 2    hydroCHLOROthiazide (HYDRODIURIL) 25 MG tablet Take 0.5 tablets by mouth daily 90 tablet 1    losartan (COZAAR) 100 MG tablet TAKE 1 TABLET DAILY 90 tablet 2    atorvastatin (LIPITOR) 40 MG tablet TAKE 1 TABLET DAILY 90 tablet 3    metoprolol tartrate (LOPRESSOR) 25 MG tablet TAKE 1 TABLET TWICE A  tablet 3    vitamin D (ERGOCALCIFEROL) 1.25 MG (47391 UT) CAPS capsule TAKE 1 CAPSULE ONCE A WEEK 12 capsule 3    aspirin 81 MG EC tablet Take 81 mg by mouth 2 times daily       acetaminophen (TYLENOL ARTHRITIS PAIN) 650 MG extended release tablet Take 650 mg by mouth every 8 hours as needed for Pain      sodium chloride (OCEAN, BABY AYR) 0.65 % nasal spray 1 spray by Nasal route as needed for Congestion      Multiple Vitamin (MULTI-VITAMIN) TABS Take by mouth daily       bisacodyl (DULCOLAX) 5 MG EC tablet Take 1 tablet by mouth daily as needed for Constipation 30 tablet 0    sennosides-docusate sodium (SENOKOT-S) 8.6-50 MG tablet Take 1 tablet by mouth 2 times daily 30 tablet 0     No current facility-administered medications for this visit. No Known Allergies  Health Maintenance   Topic Date Due    DTaP/Tdap/Td vaccine (1 - Tdap) Never done    Shingles Vaccine (2 of 3) 08/14/2009    Pneumococcal 65+ years Vaccine (2 - PPSV23 or PCV20) 08/27/2014    Depression Screen  10/18/2022    Annual Wellness Visit (AWV)  10/19/2022    Lipids  12/17/2022    Prostate Specific Antigen (PSA) Screening or Monitoring  12/17/2022    Potassium  04/05/2023    Creatinine  04/05/2023    Colorectal Cancer Screen  09/13/2029    Flu vaccine  Completed    COVID-19 Vaccine  Completed    AAA screen  Completed    Hepatitis C screen  Completed    Hepatitis A vaccine  Aged Out    Hepatitis B vaccine  Aged Out    Hib vaccine  Aged Out    Meningococcal (ACWY) vaccine  Aged Out       Subjective:  Review of Systems   Constitutional: Positive for fatigue. HENT: Negative for ear discharge. Respiratory: Positive for shortness of breath. Negative for chest tightness. Cardiovascular: Negative for chest pain and palpitations. Gastrointestinal: Negative for abdominal distention, abdominal pain, anal bleeding, blood in stool, constipation, diarrhea, nausea, rectal pain and vomiting. Endocrine: Negative for polyphagia. Genitourinary: Negative for dysuria, frequency, hematuria and urgency. Musculoskeletal: Negative for arthralgias, back pain, gait problem, joint swelling, myalgias, neck pain and neck stiffness. Skin: Negative for color change, pallor, rash and wound. Allergic/Immunologic: Negative for food allergies. Neurological: Negative for dizziness, seizures and headaches. Psychiatric/Behavioral: Negative for behavioral problems, confusion, decreased concentration, dysphoric mood and hallucinations.  The patient is not hyperactive. Objective:  Physical Exam  HENT:      Head: Normocephalic. Right Ear: Tympanic membrane normal.      Nose: Nose normal.      Mouth/Throat:      Mouth: Mucous membranes are moist.   Eyes:      Pupils: Pupils are equal, round, and reactive to light. Cardiovascular:      Rate and Rhythm: Normal rate and regular rhythm. Heart sounds: Murmur heard. No gallop. Pulmonary:      Effort: No respiratory distress. Breath sounds: No stridor. No wheezing, rhonchi or rales. Chest:      Chest wall: No tenderness. Abdominal:      General: There is no distension. Palpations: There is no mass. Tenderness: There is no abdominal tenderness. There is no right CVA tenderness, left CVA tenderness, guarding or rebound. Hernia: No hernia is present. Musculoskeletal:         General: No swelling, tenderness, deformity or signs of injury. Cervical back: Normal range of motion. Right lower leg: No edema. Left lower leg: No edema. Skin:     Coloration: Skin is not jaundiced or pale. Findings: No bruising, erythema, lesion or rash. Neurological:      Mental Status: He is alert and oriented to person, place, and time. Cranial Nerves: No cranial nerve deficit. Sensory: No sensory deficit. Motor: No weakness. Coordination: Coordination normal.      Gait: Gait normal.      Deep Tendon Reflexes: Reflexes normal.   Psychiatric:         Mood and Affect: Mood normal.         Thought Content: Thought content normal.       BP (!) 152/60   Pulse 98   Ht 5' 11\" (1.803 m)   Wt 235 lb (106.6 kg)   BMI 32.78 kg/m²     Assessment:      Diagnosis Orders   1. Pre-operative clearance  EKG 12 lead   2. Primary hypertension     3. Familial hypercholesterolemia     4. Coronary artery disease involving coronary bypass graft of native heart without angina pectoris         Plan:  No follow-ups on file.     Orders Placed:  Orders Placed This Encounter   Procedures  EKG 12 lead     Order Specific Question:   Reason for Exam?     Answer: Other       Medications Prescribed:  No orders of the defined types were placed in this encounter. Discussed use, benefit, and side effects of prescribed medications. All patient questions answered. Pt voicedunderstanding. Instructed to continue current medications, diet and exercise. Continue risk factor modification and medical management. Patient agreed with treatment plan. Follow up as directed.     Electronically signedby Donna Rebollar MD on 4/28/2022 at 9:01 AM

## 2022-04-29 ENCOUNTER — CARE COORDINATION (OUTPATIENT)
Dept: FAMILY MEDICINE CLINIC | Age: 72
End: 2022-04-29

## 2022-04-29 NOTE — CARE COORDINATION
Shady Gutierrez denies any needs. His output is better with the Flomax and he is feeling good. I will follow up with him next week.

## 2022-05-05 ENCOUNTER — NURSE ONLY (OUTPATIENT)
Dept: UROLOGY | Age: 72
End: 2022-05-05
Payer: MEDICARE

## 2022-05-05 ENCOUNTER — TELEPHONE (OUTPATIENT)
Dept: UROLOGY | Age: 72
End: 2022-05-05

## 2022-05-05 DIAGNOSIS — R33.9 URINARY RETENTION: ICD-10-CM

## 2022-05-05 PROCEDURE — 51700 IRRIGATION OF BLADDER: CPT | Performed by: UROLOGY

## 2022-05-05 NOTE — TELEPHONE ENCOUNTER
No Prior Auth required for CPT [address] [facility], [address] [place] [address] per Kala Hoyos; IFB#[id number]

## 2022-05-05 NOTE — PROGRESS NOTES
Patient has given me verbal consent to perform fill and spill procedure  Yes    With catheter in place 300 cc water instilled into bladder. 10 cc of water deflated from balloon. 16 Fr coude ortiz removed without difficulty. Pt then voided 250 cc. Per Dr. Ivetet Castro patient is to follow up as scheduled, if he is unable is urinate by 2:00-2:30 pm he is to call the office and come in to have a ortiz catheter placed. Patient verbalized and understood.

## 2022-05-06 ENCOUNTER — CARE COORDINATION (OUTPATIENT)
Dept: FAMILY MEDICINE CLINIC | Age: 72
End: 2022-05-06

## 2022-05-06 NOTE — CARE COORDINATION
Colin Burdick was out getting a haircut and I spoke to his wife Yeimy Ramírez. She said he is feeling much better. He has no additional needs at this time and she thanked me for calling to check on him.

## 2022-05-09 ENCOUNTER — OFFICE VISIT (OUTPATIENT)
Dept: UROLOGY | Age: 72
End: 2022-05-09
Payer: MEDICARE

## 2022-05-09 VITALS
HEIGHT: 71 IN | WEIGHT: 236 LBS | DIASTOLIC BLOOD PRESSURE: 60 MMHG | SYSTOLIC BLOOD PRESSURE: 122 MMHG | BODY MASS INDEX: 33.04 KG/M2

## 2022-05-09 DIAGNOSIS — R33.9 URINARY RETENTION: Primary | ICD-10-CM

## 2022-05-09 LAB — POST VOID RESIDUAL (PVR): 211 ML

## 2022-05-09 PROCEDURE — 51798 US URINE CAPACITY MEASURE: CPT | Performed by: UROLOGY

## 2022-05-09 PROCEDURE — 99024 POSTOP FOLLOW-UP VISIT: CPT | Performed by: UROLOGY

## 2022-05-09 NOTE — PROGRESS NOTES
Dr. Geno Tomas MD MD  [facility] Consultation / New Patient Visit    Patient:  Raul Kelly  YOB: 1950  Date: 5/9/2022  Consult requested from Leila Scott MD     HISTORY OF PRESENT ILLNESS:   The patient is a 66 y.o. male who presents today for follow-up for the following problem(s): Right ureteral stricture  Overall the problem(s) : are worsening. Associated Symptoms: No dysuria, gross hematuria. Pain Severity:      Today visit:   5/9/22    Presents with history of right hydronephrosis, s/p right robo ureteral reimplant (4/4/22). Had post op retention, PVR: 211 ml. Summary of old records:   (Patient's old records, notes and chart reviewed and summarized above.)    Last several PSA's:  Lab Results   Component Value Date    PSA 5.11 (H) 12/17/2021    PSA 3.89 (H) 07/10/2019    PSA 4.18 (H) 05/17/2018       Last total testosterone:  No results found for: TESTOSTERONE    Urinalysis today:  Results for POC orders placed in visit on 05/09/22   poct post void residual   Result Value Ref Range    post void residual 211 ml         Last BUN and creatinine:  Lab Results   Component Value Date    BUN 15 04/05/2022     Lab Results   Component Value Date    CREATININE 0.9 04/05/2022       Imaging Reviewed during this Office Visit:   (results were independently reviewed by physician and radiology report verified)    PAST MEDICAL, FAMILY AND SOCIAL HISTORY:  Past Medical History:   Diagnosis Date    Arthritis     ASHD (arteriosclerotic heart disease)     CAD (coronary artery disease)     Status post bypass    Colon polyps 2009      colonoscopy  tubular  adenoma   rinesmitmigel    Decreased sense of smell 5/22/2015    Epistaxis 5/22/2015    History of blood transfusion 2005    ? ???? with by pass? ??    Hyperlipidemia     Hypertension     Kidney stones     Mild carotid artery disease ([address] 2013    Nasal obstruction 5/22/2015    Nasal septal perforation 5/22/2015 Past Surgical History:   Procedure Laterality Date    BLADDER SURGERY Right 12/20/2021    CYSTO, URETEROSCOPY, RIGHT RETROGRADE PYELOGRAM, URETERAL DILATION, WITH URETERAL BX RIGHT URETERAL STENT PLACEMENT performed by Mika Metcalf MD at [facility] Right 1/31/2022    CYSTOSCOPY RIGHT RETROGRADE PYELOGRAM, RIGHT STENT EXCHANGE performed by Mika Metcalf MD at [facility] Right 4/4/2022    CYSTOSCOPY, RIGHT STENT EXCHANGE, RIGHT RETROGRADE PYELOGRAM, ROBOTIC RIGHT URETERAL REIMPLANT performed by Mika Metcalf MD at [address]  2005    6 by pass    COLONOSCOPY  12/2019    Dr Chilango Dorman  colon polyps 8//2014 9-2019    COLONOSCOPY Left 09/13/2019    COLONOSCOPY POLYPECTOMY SNARE/COLD BIOPSY performed by Art Rao MD at [address]  11/2005    EXCISION OF AURAL MASS      NASAL POLYP SURGERY  06/05/2015    BIOPSY OF NASAL POLYP MIDDLE TURBINATE AND BIOPSY SEPTAL [address]    SKIN CANCER EXCISION  2018    on back    TONSILLECTOMY      as a child     Family History   Problem Relation Age of Onset    Heart Disease Mother     Cancer Father         throat    Diabetes Sister     Asthma Brother      Outpatient Medications Marked as Taking for the 5/9/22 encounter (Office Visit) with Mika Metcalf MD   Medication Sig Dispense Refill    tamsulosin (FLOMAX) 0.4 MG capsule Take 1 capsule by mouth daily 30 capsule 2    hydroCHLOROthiazide (HYDRODIURIL) 25 MG tablet Take 0.5 tablets by mouth daily 90 tablet 1    losartan (COZAAR) 100 MG tablet TAKE 1 TABLET DAILY 90 tablet 2    atorvastatin (LIPITOR) 40 MG tablet TAKE 1 TABLET DAILY 90 tablet 3    metoprolol tartrate (LOPRESSOR) 25 MG tablet TAKE 1 TABLET TWICE A  tablet 3    vitamin D (ERGOCALCIFEROL) 1.25 MG (04784 UT) CAPS capsule TAKE 1 CAPSULE ONCE A WEEK 12 capsule 3    aspirin 81 MG EC tablet Take 81 mg by mouth 2 times daily       acetaminophen (TYLENOL ARTHRITIS PAIN) 650 MG extended release tablet Take 650 mg by mouth every 8 hours as needed for Pain      sodium chloride (OCEAN, BABY AYR) 0.65 % nasal spray 1 spray by Nasal route as needed for Congestion      Multiple Vitamin (MULTI-VITAMIN) TABS Take by mouth daily          Patient has no known allergies. Social History     Tobacco Use   Smoking Status Former Smoker    Packs/day: 1.50    Years: 30.00    Pack years: 45.00    Types: Cigarettes    Quit date: 6/3/2002    Years since quittin.9   Smokeless Tobacco Never Used       Social History     Substance and Sexual Activity   Alcohol Use Yes    Comment: seldom       REVIEW OF SYSTEMS:  Constitutional: negative  Eyes: negative  Respiratory: negative  Cardiovascular: negative  Gastrointestinal: negative  Musculoskeletal: negative  Genitourinary: negative  Skin: negative   Neurological: negative  Hematological/Lymphatic: negative  Psychological: negative    Physical Exam:    This a 66 y.o. male   Vitals:    22 1240   BP: 122/60     Constitutional: Patient in no acute distress   Neuro: alert and oriented to person place and time. Psych: Mood and affect normal.  Head: atraumatic normocephalic  Eyes: EOMi  HEENT: neck supple, trachea midline  Lungs: Respiratory effort normal  Cardiovascular:  Normal peripheral pulses  Abdomen: Soft, non-tender, non-distended, No CVA  Bladder: non-tender and not distended. FROMx4, no cyanosis clubbing edema  Skin: warm and dry    Assessment and Plan      1. Urinary retention         Plan:      No follow-ups on file. Right ureteral stricture - Robotic Right ureteral reimplant (22). Needs cysto right rgpg and stent removal - scheduled for 6 weeks after procedure. BPH with urinary retention - continue flomax for now. - PVR: 211 ml.  AUASS: .

## 2022-05-10 ENCOUNTER — PREP FOR PROCEDURE (OUTPATIENT)
Dept: UROLOGY | Age: 72
End: 2022-05-10

## 2022-05-10 RX ORDER — SODIUM CHLORIDE 9 MG/ML
INJECTION, SOLUTION INTRAVENOUS CONTINUOUS
Status: CANCELLED | OUTPATIENT
Start: 2022-06-06

## 2022-05-13 ENCOUNTER — HOSPITAL ENCOUNTER (OUTPATIENT)
Dept: NON INVASIVE DIAGNOSTICS | Age: 72
Discharge: HOME | End: 2022-05-13
Payer: MEDICARE

## 2022-05-13 DIAGNOSIS — I25.810 CORONARY ARTERY DISEASE INVOLVING CORONARY BYPASS GRAFT OF NATIVE HEART WITHOUT ANGINA PECTORIS: ICD-10-CM

## 2022-05-13 DIAGNOSIS — E78.01 FAMILIAL HYPERCHOLESTEROLEMIA: ICD-10-CM

## 2022-05-13 DIAGNOSIS — I10 PRIMARY HYPERTENSION: ICD-10-CM

## 2022-05-13 DIAGNOSIS — Z01.818 PRE-OPERATIVE CLEARANCE: ICD-10-CM

## 2022-05-13 DIAGNOSIS — R06.02 SOB (SHORTNESS OF BREATH): ICD-10-CM

## 2022-05-13 LAB
LV EF: 60 %
LVEF MODALITY: NORMAL

## 2022-05-13 PROCEDURE — 93017 CV STRESS TEST TRACING ONLY: CPT | Performed by: NUCLEAR MEDICINE

## 2022-05-13 PROCEDURE — 78452 HT MUSCLE IMAGE SPECT MULT: CPT

## 2022-05-13 PROCEDURE — 3430000000 HC RX DIAGNOSTIC RADIOPHARMACEUTICAL: Performed by: NUCLEAR MEDICINE

## 2022-05-13 PROCEDURE — 6360000002 HC RX W HCPCS

## 2022-05-13 PROCEDURE — A9500 TC99M SESTAMIBI: HCPCS | Performed by: NUCLEAR MEDICINE

## 2022-05-13 PROCEDURE — 93306 TTE W/DOPPLER COMPLETE: CPT

## 2022-05-13 RX ADMIN — Medication 35 MILLICURIE: at 14:10

## 2022-05-13 RX ADMIN — Medication 10.4 MILLICURIE: at 13:25

## 2022-05-16 ENCOUNTER — TELEPHONE (OUTPATIENT)
Dept: UROLOGY | Age: 72
End: 2022-05-16

## 2022-05-16 ENCOUNTER — TELEPHONE (OUTPATIENT)
Dept: CARDIOLOGY CLINIC | Age: 72
End: 2022-05-16

## 2022-05-16 ENCOUNTER — NURSE ONLY (OUTPATIENT)
Dept: UROLOGY | Age: 72
End: 2022-05-16
Payer: MEDICARE

## 2022-05-16 DIAGNOSIS — R39.15 URINARY URGENCY: ICD-10-CM

## 2022-05-16 DIAGNOSIS — R33.9 URINARY RETENTION: Primary | ICD-10-CM

## 2022-05-16 LAB
BILIRUBIN URINE: NEGATIVE
BLOOD URINE, POC: ABNORMAL
CHARACTER, URINE: CLEAR
COLOR, URINE: YELLOW
GLUCOSE URINE: NEGATIVE MG/DL
KETONES, URINE: NEGATIVE
LEUKOCYTE CLUMPS, URINE: ABNORMAL
NITRITE, URINE: POSITIVE
PH, URINE: 5.5 (ref 5–9)
PROTEIN, URINE: ABNORMAL MG/DL
SPECIFIC GRAVITY, URINE: 1.01 (ref 1–1.03)
UROBILINOGEN, URINE: 0.2 EU/DL (ref 0–1)

## 2022-05-16 PROCEDURE — 81003 URINALYSIS AUTO W/O SCOPE: CPT | Performed by: UROLOGY

## 2022-05-16 PROCEDURE — 51798 US URINE CAPACITY MEASURE: CPT | Performed by: UROLOGY

## 2022-05-16 RX ORDER — SULFAMETHOXAZOLE AND TRIMETHOPRIM 800; 160 MG/1; MG/1
1 TABLET ORAL 2 TIMES DAILY
Qty: 10 TABLET | Refills: 0 | Status: SHIPPED | OUTPATIENT
Start: 2022-05-16 | End: 2022-05-21

## 2022-05-16 NOTE — TELEPHONE ENCOUNTER
Pt had stress and echo   Please advise on clearance for cysto retro pyelogram/stent removal with Dr. Staci Davis in Dr. Frank mccoy

## 2022-05-16 NOTE — PROGRESS NOTES
Patient called in having problems voiding. Patient here gave UA and PVR. UA shows Blood Moderate, Nitrates Positive, Leukocytes Large and PVR is 85mL. Per Dr. Katya Hodgson patient is to start Bactrim DS BID x 5 days. Urine sent for culture.

## 2022-05-16 NOTE — TELEPHONE ENCOUNTER
Patient c/o problems with urination. Voiding small amounts frequently. Does not know if he emptying his bladder. Scheduled for lab visit after speaking to Dr Lisha Jimenes.

## 2022-05-18 LAB
ORGANISM: ABNORMAL
ORGANISM: ABNORMAL
URINE CULTURE, ROUTINE: ABNORMAL
URINE CULTURE, ROUTINE: ABNORMAL

## 2022-05-23 ENCOUNTER — HOSPITAL ENCOUNTER (OUTPATIENT)
Age: 72
Discharge: HOME | End: 2022-05-23
Payer: MEDICARE

## 2022-05-23 PROCEDURE — 87077 CULTURE AEROBIC IDENTIFY: CPT

## 2022-05-23 PROCEDURE — 87186 SC STD MICRODIL/AGAR DIL: CPT

## 2022-05-23 PROCEDURE — 87086 URINE CULTURE/COLONY COUNT: CPT

## 2022-05-26 ENCOUNTER — TELEPHONE (OUTPATIENT)
Dept: UROLOGY | Age: 72
End: 2022-05-26

## 2022-05-26 RX ORDER — AMPICILLIN 500 MG/1
500 CAPSULE ORAL 4 TIMES DAILY
Qty: 40 CAPSULE | Refills: 0 | Status: SHIPPED | OUTPATIENT
Start: 2022-05-26 | End: 2022-06-05

## 2022-05-26 RX ORDER — SULFAMETHOXAZOLE AND TRIMETHOPRIM 800; 160 MG/1; MG/1
1 TABLET ORAL 2 TIMES DAILY
Qty: 20 TABLET | Refills: 0 | Status: SHIPPED | OUTPATIENT
Start: 2022-05-26 | End: 2022-06-05

## 2022-05-26 NOTE — TELEPHONE ENCOUNTER
Pt's urine culture significant for Enterococcus and Serratia. Scripts for Bactrim and Ampicillin sent to pharmacy.

## 2022-05-26 NOTE — TELEPHONE ENCOUNTER
Sal Claudio on [facility] advised of the urine results and Bactrim and Ampicillin sent for treatment. She voiced understanding.

## 2022-05-26 NOTE — TELEPHONE ENCOUNTER
Please review urine culture on 5/23/2022. Surgery with Dr. Shane Gaviria on 6/6/22 for a Cystoscopy Right Retrograde Pyelogram Right Ureteral Stent Removal.  Thanks.

## 2022-05-27 NOTE — PROGRESS NOTES
PAT call attempted, patient unavailable, left message to please call us back at your earliest convenience; [phone]

## 2022-05-31 ENCOUNTER — TELEPHONE (OUTPATIENT)
Dept: UROLOGY | Age: 72
End: 2022-05-31

## 2022-05-31 NOTE — TELEPHONE ENCOUNTER
Patient notified of new surgery arrival time. Patient is to be at [facility] Same day surgery by  630am   on  6/20/2022    Patient reminded to have nothing to eat or drink after midnight. Patient voiced understanding.

## 2022-06-20 ENCOUNTER — ANESTHESIA (OUTPATIENT)
Dept: OPERATING ROOM | Age: 72
End: 2022-06-20
Payer: MEDICARE

## 2022-06-20 ENCOUNTER — HOSPITAL ENCOUNTER (OUTPATIENT)
Age: 72
Setting detail: OUTPATIENT SURGERY
Discharge: HOME | End: 2022-06-20
Attending: UROLOGY | Admitting: UROLOGY
Payer: MEDICARE

## 2022-06-20 ENCOUNTER — ANESTHESIA EVENT (OUTPATIENT)
Dept: OPERATING ROOM | Age: 72
End: 2022-06-20
Payer: MEDICARE

## 2022-06-20 VITALS
HEART RATE: 71 BPM | WEIGHT: 232.2 LBS | DIASTOLIC BLOOD PRESSURE: 66 MMHG | TEMPERATURE: 97.7 F | BODY MASS INDEX: 32.51 KG/M2 | HEIGHT: 71 IN | SYSTOLIC BLOOD PRESSURE: 146 MMHG | RESPIRATION RATE: 18 BRPM | OXYGEN SATURATION: 94 %

## 2022-06-20 LAB — GLUCOSE BLD-MCNC: 103 MG/DL (ref 70–108)

## 2022-06-20 PROCEDURE — 82948 REAGENT STRIP/BLOOD GLUCOSE: CPT

## 2022-06-20 PROCEDURE — 7100000010 HC PHASE II RECOVERY - FIRST 15 MIN: Performed by: UROLOGY

## 2022-06-20 PROCEDURE — 6360000004 HC RX CONTRAST MEDICATION: Performed by: UROLOGY

## 2022-06-20 PROCEDURE — 7100000000 HC PACU RECOVERY - FIRST 15 MIN: Performed by: UROLOGY

## 2022-06-20 PROCEDURE — C1758 CATHETER, URETERAL: HCPCS | Performed by: UROLOGY

## 2022-06-20 PROCEDURE — 2580000003 HC RX 258: Performed by: UROLOGY

## 2022-06-20 PROCEDURE — 7100000001 HC PACU RECOVERY - ADDTL 15 MIN: Performed by: UROLOGY

## 2022-06-20 PROCEDURE — 3600000002 HC SURGERY LEVEL 2 BASE: Performed by: UROLOGY

## 2022-06-20 PROCEDURE — 3700000001 HC ADD 15 MINUTES (ANESTHESIA): Performed by: UROLOGY

## 2022-06-20 PROCEDURE — 3700000000 HC ANESTHESIA ATTENDED CARE: Performed by: UROLOGY

## 2022-06-20 PROCEDURE — 6360000002 HC RX W HCPCS: Performed by: NURSE ANESTHETIST, CERTIFIED REGISTERED

## 2022-06-20 PROCEDURE — 3600000012 HC SURGERY LEVEL 2 ADDTL 15MIN: Performed by: UROLOGY

## 2022-06-20 PROCEDURE — 7100000011 HC PHASE II RECOVERY - ADDTL 15 MIN: Performed by: UROLOGY

## 2022-06-20 PROCEDURE — 6360000002 HC RX W HCPCS: Performed by: UROLOGY

## 2022-06-20 PROCEDURE — 2709999900 HC NON-CHARGEABLE SUPPLY: Performed by: UROLOGY

## 2022-06-20 RX ORDER — SODIUM CHLORIDE 0.9 % (FLUSH) 0.9 %
5-40 SYRINGE (ML) INJECTION PRN
Status: CANCELLED | OUTPATIENT
Start: 2022-06-20

## 2022-06-20 RX ORDER — PROPOFOL 10 MG/ML
INJECTION, EMULSION INTRAVENOUS PRN
Status: DISCONTINUED | OUTPATIENT
Start: 2022-06-20 | End: 2022-06-20 | Stop reason: SDUPTHER

## 2022-06-20 RX ORDER — CIPROFLOXACIN 500 MG/1
500 TABLET, FILM COATED ORAL 2 TIMES DAILY
Qty: 6 TABLET | Refills: 0 | Status: SHIPPED | OUTPATIENT
Start: 2022-06-20 | End: 2022-06-23

## 2022-06-20 RX ORDER — FENTANYL CITRATE 50 UG/ML
50 INJECTION, SOLUTION INTRAMUSCULAR; INTRAVENOUS EVERY 5 MIN PRN
Status: CANCELLED | OUTPATIENT
Start: 2022-06-20

## 2022-06-20 RX ORDER — ONDANSETRON 2 MG/ML
4 INJECTION INTRAMUSCULAR; INTRAVENOUS
Status: CANCELLED | OUTPATIENT
Start: 2022-06-20 | End: 2022-06-20

## 2022-06-20 RX ORDER — MEPERIDINE HYDROCHLORIDE 25 MG/ML
12.5 INJECTION INTRAMUSCULAR; INTRAVENOUS; SUBCUTANEOUS EVERY 5 MIN PRN
Status: CANCELLED | OUTPATIENT
Start: 2022-06-20

## 2022-06-20 RX ORDER — SODIUM CHLORIDE 9 MG/ML
INJECTION, SOLUTION INTRAVENOUS CONTINUOUS
Status: DISCONTINUED | OUTPATIENT
Start: 2022-06-20 | End: 2022-06-20 | Stop reason: HOSPADM

## 2022-06-20 RX ORDER — LIDOCAINE HYDROCHLORIDE 20 MG/ML
INJECTION, SOLUTION INTRAVENOUS PRN
Status: DISCONTINUED | OUTPATIENT
Start: 2022-06-20 | End: 2022-06-20 | Stop reason: SDUPTHER

## 2022-06-20 RX ORDER — SODIUM CHLORIDE 0.9 % (FLUSH) 0.9 %
5-40 SYRINGE (ML) INJECTION EVERY 12 HOURS SCHEDULED
Status: CANCELLED | OUTPATIENT
Start: 2022-06-20

## 2022-06-20 RX ORDER — HYDRALAZINE HYDROCHLORIDE 20 MG/ML
10 INJECTION INTRAMUSCULAR; INTRAVENOUS
Status: CANCELLED | OUTPATIENT
Start: 2022-06-20

## 2022-06-20 RX ORDER — SODIUM CHLORIDE 9 MG/ML
INJECTION, SOLUTION INTRAVENOUS PRN
Status: CANCELLED | OUTPATIENT
Start: 2022-06-20

## 2022-06-20 RX ORDER — FENTANYL CITRATE 50 UG/ML
INJECTION, SOLUTION INTRAMUSCULAR; INTRAVENOUS PRN
Status: DISCONTINUED | OUTPATIENT
Start: 2022-06-20 | End: 2022-06-20 | Stop reason: SDUPTHER

## 2022-06-20 RX ORDER — DEXAMETHASONE SODIUM PHOSPHATE 10 MG/ML
INJECTION, EMULSION INTRAMUSCULAR; INTRAVENOUS PRN
Status: DISCONTINUED | OUTPATIENT
Start: 2022-06-20 | End: 2022-06-20 | Stop reason: SDUPTHER

## 2022-06-20 RX ORDER — ONDANSETRON 2 MG/ML
INJECTION INTRAMUSCULAR; INTRAVENOUS PRN
Status: DISCONTINUED | OUTPATIENT
Start: 2022-06-20 | End: 2022-06-20 | Stop reason: SDUPTHER

## 2022-06-20 RX ORDER — DIPHENHYDRAMINE HYDROCHLORIDE 50 MG/ML
12.5 INJECTION INTRAMUSCULAR; INTRAVENOUS
Status: CANCELLED | OUTPATIENT
Start: 2022-06-20 | End: 2022-06-20

## 2022-06-20 RX ADMIN — ONDANSETRON 4 MG: 2 INJECTION INTRAMUSCULAR; INTRAVENOUS at 07:29

## 2022-06-20 RX ADMIN — PROPOFOL 170 MG: 10 INJECTION, EMULSION INTRAVENOUS at 07:23

## 2022-06-20 RX ADMIN — CEFAZOLIN 2000 MG: 10 INJECTION, POWDER, FOR SOLUTION INTRAVENOUS at 07:31

## 2022-06-20 RX ADMIN — DEXAMETHASONE SODIUM PHOSPHATE 10 MG: 10 INJECTION, EMULSION INTRAMUSCULAR; INTRAVENOUS at 07:29

## 2022-06-20 RX ADMIN — SODIUM CHLORIDE: 9 INJECTION, SOLUTION INTRAVENOUS at 07:12

## 2022-06-20 RX ADMIN — LIDOCAINE HYDROCHLORIDE 100 MG: 20 INJECTION, SOLUTION INTRAVENOUS at 07:23

## 2022-06-20 RX ADMIN — FENTANYL CITRATE 50 MCG: 50 INJECTION, SOLUTION INTRAMUSCULAR; INTRAVENOUS at 07:30

## 2022-06-20 ASSESSMENT — LIFESTYLE VARIABLES: SMOKING_STATUS: 0

## 2022-06-20 ASSESSMENT — PAIN SCALES - GENERAL
PAINLEVEL_OUTOF10: 0

## 2022-06-20 NOTE — PROGRESS NOTES
ADMITTED TO [facility] AND ORIENTED TO UNIT. SCDS ON. FALL BAND ON. PT VERBALIZED APPROVAL FOR FIRST NAME, LAST INITIAL AND PHYSICIAN NAME ON UNIT WHITEBOARD. Wife, Tyler Morris is with the patient.

## 2022-06-20 NOTE — PROGRESS NOTES
748 Non reactive on arrival to PACU with spontaneous resp , and O2 to LMA  750 accucheck 103  802 pt eyes open looking around LMA removed and O2 off   805 alert and oriented , denies any pain or nausea  815 continues to deny pain or nausea   820 meets criteria for discharge , transported to [facility]

## 2022-06-20 NOTE — ANESTHESIA POSTPROCEDURE EVALUATION
Department of Anesthesiology  Postprocedure Note    Patient: Filipe Torres  MRN: [id number]  YOB: 1950  Date of evaluation: 6/20/2022      Procedure Summary     Date: 06/20/22 Room / Location: [facility] / [facility]    Anesthesia Start: Karlie Ramos Anesthesia Stop: 8269    Procedure: Cystoscopy Right Retrograde Pyelogram Right Ureteral Stent Removal (Right Ureter) Diagnosis:       Dysuria      (Dysuria)    Surgeons: Deven Carpio MD Responsible Provider: Marquez Holstein, DO    Anesthesia Type: general ASA Status: 2          Anesthesia Type: No value filed. Moira Phase I: Moira Score: 10    Moira Phase II: Moira Score: 10    Last vitals:   Vitals Value Taken Time   /70 06/20/22 0815   Temp 97.2 °F (36.2 °C) 06/20/22 0748   Pulse 57 06/20/22 0818   Resp 25 06/20/22 0818   SpO2 94 % 06/20/22 0818   Vitals shown include unvalidated device data.       Anesthesia Post Evaluation    Patient location during evaluation: PACU  Patient participation: complete - patient participated  Level of consciousness: awake and alert  Airway patency: patent  Nausea & Vomiting: no vomiting and no nausea  Complications: no  Cardiovascular status: hemodynamically stable  Respiratory status: acceptable  Hydration status: stable

## 2022-06-20 NOTE — ANESTHESIA PRE PROCEDURE
Department of Anesthesiology  Preprocedure Note       Name:  Louie Goodwin   Age:  67 y.o.  :  1950                                          MRN:  [id number]         Date:  2022      Surgeon: Alvaro Her):  Kaitlin Caicedo MD    Procedure: Procedure(s):  Cystoscopy Right Retrograde Pyelogram Right Ureteral Stent Removal    Medications prior to admission:   Prior to Admission medications    Medication Sig Start Date End Date Taking?  Authorizing Provider   tamsulosin (FLOMAX) 0.4 MG capsule Take 1 capsule by mouth daily 22   RASHAD Green CNP   hydroCHLOROthiazide (HYDRODIURIL) 25 MG tablet Take 0.5 tablets by mouth daily 4/20/22 10/17/22  Sarmad Bradford MD   losartan (COZAAR) 100 MG tablet TAKE 1 TABLET DAILY 22   Sarmad Bradford MD   atorvastatin (LIPITOR) 40 MG tablet TAKE 1 TABLET DAILY 3/5/22   RASHAD Ronquillo CNP   metoprolol tartrate (LOPRESSOR) 25 MG tablet TAKE 1 TABLET TWICE A DAY 3/5/22   RASHAD Ronquillo CNP   vitamin D (ERGOCALCIFEROL) 1.25 MG (12829 UT) CAPS capsule TAKE 1 CAPSULE ONCE A WEEK 22   RASHAD Ronquillo CNP   aspirin 81 MG EC tablet Take 81 mg by mouth 2 times daily     Historical Provider, MD   acetaminophen (TYLENOL ARTHRITIS PAIN) 650 MG extended release tablet Take 650 mg by mouth every 8 hours as needed for Pain    Historical Provider, MD   sodium chloride (OCEAN, BABY AYR) 0.65 % nasal spray 1 spray by Nasal route as needed for Congestion    Historical Provider, MD   Multiple Vitamin (MULTI-VITAMIN) TABS Take by mouth daily     Historical Provider, MD       Current medications:    Current Facility-Administered Medications   Medication Dose Route Frequency Provider Last Rate Last Admin    0.9 % sodium chloride infusion   IntraVENous Continuous Kaitlin Caicedo MD        ceFAZolin (ANCEF) 2000 mg in dextrose 5 % 50 mL IVPB  2,000 mg IntraVENous 30 Min Pre-Op Kaitlin Caicedo MD Allergies:  No Known Allergies    Problem List:    Patient Active Problem List   Diagnosis Code    CAD (coronary artery disease) I25.10    Hypertension I10    Hyperlipidemia E78.5    S/P CABG x 6,2007 Z95.1    CAD (coronary artery disease) I25.10    Colon polyps K63.5    Ureteral stenosis, right N13.5    Ureteral stricture N13.5       Past Medical History:        Diagnosis Date    Arthritis     ASHD (arteriosclerotic heart disease)     CAD (coronary artery disease)     Status post bypass    Colon polyps 2009      colonoscopy  tubular  adenoma   rinesmith    Decreased sense of smell 5/22/2015    Epistaxis 5/22/2015    History of blood transfusion 2005    ? ???? with by pass? ??    Hyperlipidemia     Hypertension     Kidney stones     Mild carotid artery disease ([facility] [address] 2013    Nasal obstruction 5/22/2015    Nasal septal perforation 5/22/2015       Past Surgical History:        Procedure Laterality Date    BLADDER SURGERY Right 12/20/2021    CYSTO, URETEROSCOPY, RIGHT RETROGRADE PYELOGRAM, URETERAL DILATION, WITH URETERAL BX RIGHT URETERAL STENT PLACEMENT performed by Donny Johnson MD at [facility] Right 1/31/2022    CYSTOSCOPY RIGHT RETROGRADE PYELOGRAM, RIGHT STENT EXCHANGE performed by Donny Johnson MD at [facility] 4/4/2022    CYSTOSCOPY, RIGHT STENT EXCHANGE, RIGHT RETROGRADE PYELOGRAM, ROBOTIC RIGHT URETERAL REIMPLANT performed by Donny Johnson MD at [address]  2005    6 by pass    COLONOSCOPY  12/2019    Dr Nick Mata  colon polyps 8//2014 9-2019    COLONOSCOPY Left 09/13/2019    COLONOSCOPY POLYPECTOMY SNARE/COLD BIOPSY performed by Delonte Segundo MD at [address]  11/2005    EXCISION OF AURAL MASS      NASAL POLYP SURGERY  06/05/2015    BIOPSY OF NASAL POLYP MIDDLE TURBINATE AND BIOPSY SEPTAL PERFORTATION    SKIN CANCER EXCISION  2018    on back    TONSILLECTOMY      as a child       Social History:    Social History     Tobacco Use    Smoking status: Former Smoker     Packs/day: 1.50     Years: 30.00     Pack years: 45.00     Types: Cigarettes     Quit date: 6/3/2002     Years since quittin.0    Smokeless tobacco: Never Used   Substance Use Topics    Alcohol use: Yes     Comment: seldom                                Counseling given: Not Answered      Vital Signs (Current):   Vitals:    22 0638   BP: (!) 146/71   Pulse: 62   Resp: 12   Temp: 98.4 °F (36.9 °C)   TempSrc: Temporal   SpO2: 94%   Weight: 232 lb 3.2 oz (105.3 kg)   Height: 5' 11\" (1.803 m)                                              BP Readings from Last 3 Encounters:   22 (!) 146/71   22 122/60   22 (!) 152/60       NPO Status:                                                                                 BMI:   Wt Readings from Last 3 Encounters:   22 232 lb 3.2 oz (105.3 kg)   22 236 lb (107 kg)   22 235 lb (106.6 kg)     Body mass index is 32.39 kg/m². CBC:   Lab Results   Component Value Date    WBC 10.4 2022    RBC 3.82 2022    RBC 4.44 2020    RBC 3 2020    HGB 12.2 2022    HCT 38.8 2022    .6 2022    RDW 13.8 2020     2022       CMP:   Lab Results   Component Value Date     2022    K 4.4 2022    K 4.8 2021     2022    CO2 18 2022    BUN 15 2022    CREATININE 0.9 2022    LABGLOM 83 2022    GLUCOSE 120 2022    GLUCOSE 97 2020    PROT 7.1 2021    CALCIUM 8.1 2022    BILITOT 0.2 2021    BILITOT Negative 2020    ALKPHOS 123 2021    AST 21 2021    ALT 21 2021       POC Tests: No results for input(s): POCGLU, POCNA, POCK, POCCL, POCBUN, POCHEMO, POCHCT in the last 72 hours.     Coags:   Lab Results   Component Value Date    INR 1.09 2015    APTT 25.7 06/03/2015       HCG (If Applicable): No results found for: PREGTESTUR, PREGSERUM, HCG, HCGQUANT     ABGs: No results found for: PHART, PO2ART, BJW4UKN, NIM1MBN, BEART, F1BNFTDP     Type & Screen (If Applicable):  No results found for: LABABO, LABRH    Drug/Infectious Status (If Applicable):  No results found for: HIV, HEPCAB    COVID-19 Screening (If Applicable): No results found for: COVID19        Anesthesia Evaluation   no history of anesthetic complications:   Airway: Mallampati: III  TM distance: >3 FB   Neck ROM: full  Mouth opening: > = 3 FB   Dental: normal exam         Pulmonary:normal exam        (-) COPD, asthma, sleep apnea and not a current smoker                           Cardiovascular:  Exercise tolerance: good (>4 METS),   (+) hypertension:, CABG/stent (CABG 2005, no CP since): no interval change, hyperlipidemia    (-) past MI                Neuro/Psych:      (-) seizures and CVA           GI/Hepatic/Renal:        (-) GERD, liver disease and no renal disease       Endo/Other:        (-) diabetes mellitus, hypothyroidism, hyperthyroidism               Abdominal:   (+) obese,           Vascular:     - DVT and PE. Other Findings:           Anesthesia Plan      general     ASA 2     (PIV. Additional access can be obtained after induction if needed. Standard ASA monitors. IV/PO opioids and other adjuncts as needed for pain control. PACU post op for recovery. Possible anesthetics complications were discussed with the patient, including but not limited to: PONV, damage to the airway and surrounding structures (teeth, lips, gums, tongue, etc.), adverse reactions to medicine, cardiac complications (MI, CHF, arrhythmias, etc.), respiratory complications (post-op ventilation, respiratory failure, etc.), neurologic complications (nerve damage, stroke, seizure), and death. The patient was given the opportunity to ask questions and all questions were answered to the patient's satisfaction.  The patient is in agreement with the anesthetic plan.  )  Induction: intravenous. Anesthetic plan and risks discussed with patient and spouse. Plan discussed with CRNA.                     Tika Webb,    6/20/2022

## 2022-06-20 NOTE — OP NOTE
FACILITY:  [address] [place] [address] [facility], [address] [place] [address]  [birth year]  [id number]    DATE: 06/20/22  SURGEON:  Dr. Britney Downing MD , MD    ASSISTANT: Dr. Britney Downing MD MD  PREOPERATIVE DIAGNOSIS:  Right ureteral reimplant.    POSTOPERATIVE DIAGNOSIS:  Same.   PROCEDURES PERFORMED:  1. Cystourethroscopy. 2. Right retrograde pyelogram.  3. Right stent removal  ANESTHESIA:  MAC    COMPLICATIONS:  None.   DRAINS:  None. SPECIMEN:  none. ESTIMATED BLOOD LOSS:  Less than 5 mL.      INDICATIONS FOR THE PROCEDURE:  Pascale Au is a 67 y.o. male presents with a history of right ureteral stricture, s/p right ureteral reimplant. He is here for stent removal.  The risks and benefits of the procedure, as well as possible alternatives and complications were discussed and he consented.          DETAILS OF THE PROCEDURE:  The patient was correctly identified in the preoperative holding area. The patient was brought back to the operating room and placed in the dorsal lithotomy position. Anesthesia was administered; antibiotics administered by Anesthesia. EPC cuffs  were on and functional. The patient was then prepped and draped in the usual sterile fashion. Once an appropriate time out had been performed, with all parties consenting, a 25 [facility] Lucian cystoscope with a 30-degree lens was placed through  the urethra into the bladder. The daryl-ureteral orifice was identified on the dome of the bladder, stent removed, and cannulated with a 5 [facility] ureteral catheter. Contrast was injected and the right ureter and renal pelvis were identified, no extravasation, and the orifice appeared patent and draining contrast.  A thorough and complete cystoscopy was performed with no abnormalities involving the bladder or mucosa. The bladder was drained and the cystoscope was removed.  The patient tolerated the procedure well and was sent to the PACU for postoperative monitoring.      Plan:  The patient was discharged home in stable condition with instructions to follow up in clinic.

## 2022-06-20 NOTE — PROGRESS NOTES
Pt returned to [facility] room 9. Vitals and assessment as charted. 0.9 infusing. Pt has muffin and diet pepsi. Family at the bedside. Pt and family verbalized understanding of discharge criteria and call light use. Call light in reach.

## 2022-06-20 NOTE — PROGRESS NOTES
Pt has met discharge criteria and states he is ready for discharge to home. IV removed, gauze and tape applied. Dressed in own clothes and personal belongings gathered. Discharge instructions (with opioid medication education information) given to pt and family; pt and family verbalized understanding of discharge instructions, prescriptions and follow up appointments. Pt transported to discharge lobby by [facility] staff.

## 2022-06-20 NOTE — H&P
Carleen Nyhan  Urology H&P Note     Patient:  Modesto Llamas  MRN: [id number]  YOB: 1950    ATTENDING: Aliza Jimenez MD     CHIEF COMPLAINT:  Right ureteral obstruction    HISTORY OF PRESENT ILLNESS:   The patient is a 67 y.o. male who presents with right hydronephrosis with obstrction. Patient's old records, notes and chart reviewed and summarized above. Past Medical History:    Past Medical History:   Diagnosis Date    Arthritis     ASHD (arteriosclerotic heart disease)     CAD (coronary artery disease)     Status post bypass    Colon polyps 2009      colonoscopy  tubular  adenoma   rinesmith    Decreased sense of smell 5/22/2015    Epistaxis 5/22/2015    History of blood transfusion 2005    ? ???? with by pass? ??    Hyperlipidemia     Hypertension     Kidney stones     Mild carotid artery disease ([facility] [address] 2013    Nasal obstruction 5/22/2015    Nasal septal perforation 5/22/2015       Past Surgical History:    Past Surgical History:   Procedure Laterality Date    BLADDER SURGERY Right 12/20/2021    CYSTO, URETEROSCOPY, RIGHT RETROGRADE PYELOGRAM, URETERAL DILATION, WITH URETERAL BX RIGHT URETERAL STENT PLACEMENT performed by Samia Mohan MD at [facility] Right 1/31/2022    CYSTOSCOPY RIGHT RETROGRADE PYELOGRAM, RIGHT STENT EXCHANGE performed by Samia [facility]an MD at [facility] 4/4/2022    CYSTOSCOPY, RIGHT STENT EXCHANGE, RIGHT RETROGRADE PYELOGRAM, ROBOTIC RIGHT URETERAL REIMPLANT performed by Samia Mohan MD at [address]  2005    6 by pass    COLONOSCOPY  12/2019    Dr Kathy Funez  colon polyps 8//2014 9-2019    COLONOSCOPY Left 09/13/2019    COLONOSCOPY POLYPECTOMY SNARE/COLD BIOPSY performed by Caitlin Reyes MD at [address]  11/2005    EXCISION OF AURAL MASS      NASAL POLYP SURGERY  06/05/2015 BIOPSY OF NASAL POLYP MIDDLE TURBINATE AND BIOPSY SEPTAL PERFORTATION    SKIN CANCER EXCISION  2018    on back    TONSILLECTOMY      as a child       Medications Prior to Admission:   Prior to Admission medications    Medication Sig Start Date End Date Taking? Authorizing Provider   tamsulosin (FLOMAX) 0.4 MG capsule Take 1 capsule by mouth daily 22   RASHAD William CNP   hydroCHLOROthiazide (HYDRODIURIL) 25 MG tablet Take 0.5 tablets by mouth daily 4/20/22 10/17/22  Matthias Barnes MD   losartan (COZAAR) 100 MG tablet TAKE 1 TABLET DAILY 22   Matthias Barnes MD   atorvastatin (LIPITOR) 40 MG tablet TAKE 1 TABLET DAILY 3/5/22   RASHAD Greenwood CNP   metoprolol tartrate (LOPRESSOR) 25 MG tablet TAKE 1 TABLET TWICE A DAY 3/5/22   RASHAD Greenwood CNP   vitamin D (ERGOCALCIFEROL) 1.25 MG (78659 UT) CAPS capsule TAKE 1 CAPSULE ONCE A WEEK 22   RASHAD Greenwood CNP   aspirin 81 MG EC tablet Take 81 mg by mouth 2 times daily     Historical Provider, MD   acetaminophen (TYLENOL ARTHRITIS PAIN) 650 MG extended release tablet Take 650 mg by mouth every 8 hours as needed for Pain    Historical Provider, MD   sodium chloride (OCEAN, BABY AYR) 0.65 % nasal spray 1 spray by Nasal route as needed for Congestion    Historical Provider, MD   Multiple Vitamin (MULTI-VITAMIN) TABS Take by mouth daily     Historical Provider, MD       Allergies:  Patient has no known allergies.     Social History:    Social History     Socioeconomic History    Marital status:      Spouse name: Not on file    Number of children: Not on file    Years of education: Not on file    Highest education level: Not on file   Occupational History    Not on file   Tobacco Use    Smoking status: Former Smoker     Packs/day: 1.50     Years: 30.00     Pack years: 45.00     Types: Cigarettes     Quit date: 6/3/2002     Years since quittin.0    Smokeless tobacco: Never Used Vaping Use    Vaping Use: Never used   Substance and Sexual Activity    Alcohol use: Yes     Comment: seldom    Drug use: No    Sexual activity: Not Currently     Partners: Female   Other Topics Concern    Not on file   Social History Narrative    Not on file     Social Determinants of Health     Financial Resource Strain:     Difficulty of Paying Living Expenses: Not on file   Food Insecurity:     Worried About Running Out of Food in the Last Year: Not on file    Richard of Food in the Last Year: Not on file   Transportation Needs:     Lack of Transportation (Medical): Not on file    Lack of Transportation (Non-Medical): Not on file   Physical Activity:     Days of Exercise per Week: Not on file    Minutes of Exercise per Session: Not on file   Stress:     Feeling of Stress : Not on file   Social Connections:     Frequency of Communication with Friends and Family: Not on file    Frequency of Social Gatherings with Friends and Family: Not on file    Attends Synagogue Services: Not on file    Active Member of [address] [place] [address] or Organizations: Not on file    Attends Club or Organization Meetings: Not on file    Marital Status: Not on file   Intimate Partner Violence:     Fear of Current or Ex-Partner: Not on file    Emotionally Abused: Not on file    Physically Abused: Not on file    Sexually Abused: Not on file   Housing Stability:     Unable to Pay for Housing in the Last Year: Not on file    Number of [place] in the Last Year: Not on file    Unstable Housing in the Last Year: Not on file       Family History:    Family History   Problem Relation Age of Onset    Heart Disease Mother     Cancer Father         throat    Diabetes Sister     Asthma Brother        REVIEW OF SYSTEMS:  All systems reviewed and negative except for that already noted in the HPI.     Physical Exam:      Patient Vitals for the past 24 hrs:   BP Temp Temp src Pulse Resp SpO2 Height Weight   06/20/22 0638 (!) 146/71 98.4 °F (36.9 °C) Temporal 62 12 94 % 5' 11\" (1.803 m) 232 lb 3.2 oz (105.3 kg)     Constitutional: Patient in no acute distress; Neuro: alert and oriented to person place and time. Psych: Mood and affect normal.  Skin: Normal  Lungs: Respiratory effort normal  Cardiovascular:  Normal peripheral pulses  Abdomen: Soft, non-tender, non-distended with no CVA, flank pain, hepatosplenomegaly or hernia. Kidneys normal.  Bladder non-tender and not distended.   Lymphatics: no palpable lymphadenopathy        Assessment and Plan   Impression:    Patient Active Problem List   Diagnosis    CAD (coronary artery disease)    Hypertension    Hyperlipidemia    S/P CABG x 6,2007    CAD (coronary artery disease)    Colon polyps    Ureteral stenosis, right    Ureteral stricture       Plan:   Cysto right rgpg and stent removal

## 2022-06-21 ENCOUNTER — TELEPHONE (OUTPATIENT)
Dept: UROLOGY | Age: 72
End: 2022-06-21

## 2022-06-21 DIAGNOSIS — R33.9 URINARY RETENTION: Primary | ICD-10-CM

## 2022-06-21 NOTE — TELEPHONE ENCOUNTER
Patient scheduled for US RENAL  at [facility] MR on 8/1/22 ARRIVAL OF 745AM FOR A 800AM SCAN. NO CARBONATED BEVERAGES; ARRIVE WELL HYDRATED.     Order mailed with instructions or given to the patient in the office

## 2022-07-13 RX ORDER — TAMSULOSIN HYDROCHLORIDE 0.4 MG/1
CAPSULE ORAL
Qty: 30 CAPSULE | Refills: 2 | Status: SHIPPED | OUTPATIENT
Start: 2022-07-13 | End: 2022-08-08

## 2022-07-13 NOTE — TELEPHONE ENCOUNTER
Amee Duffy called requesting a refill on the following medications:  Requested Prescriptions     Pending Prescriptions Disp Refills    tamsulosin (FLOMAX) 0.4 MG capsule [Pharmacy Med Name: TAMSULOSIN HCL 0.4 MG CAPSULE] 30 capsule 2     Sig: take 1 capsule by mouth once daily     Pharmacy verified:  .boy      Date of last visit: 05/09/2022  Date of next visit (if applicable): 8/6/3273

## 2022-07-20 ENCOUNTER — HOSPITAL ENCOUNTER (OUTPATIENT)
Dept: ULTRASOUND IMAGING | Age: 72
Discharge: HOME | End: 2022-07-20
Payer: MEDICARE

## 2022-07-20 DIAGNOSIS — R33.9 URINARY RETENTION: ICD-10-CM

## 2022-07-20 PROCEDURE — 76770 US EXAM ABDO BACK WALL COMP: CPT

## 2022-07-26 ENCOUNTER — HOSPITAL ENCOUNTER (EMERGENCY)
Age: 72
Discharge: HOME | End: 2022-07-26
Attending: EMERGENCY MEDICINE
Payer: MEDICARE

## 2022-07-26 ENCOUNTER — APPOINTMENT (OUTPATIENT)
Dept: CT IMAGING | Age: 72
End: 2022-07-26
Payer: MEDICARE

## 2022-07-26 VITALS
HEART RATE: 77 BPM | DIASTOLIC BLOOD PRESSURE: 82 MMHG | WEIGHT: 230 LBS | OXYGEN SATURATION: 98 % | BODY MASS INDEX: 32.2 KG/M2 | HEIGHT: 71 IN | SYSTOLIC BLOOD PRESSURE: 153 MMHG | RESPIRATION RATE: 20 BRPM | TEMPERATURE: 97.9 F

## 2022-07-26 DIAGNOSIS — R10.9 LEFT FLANK PAIN: Primary | ICD-10-CM

## 2022-07-26 DIAGNOSIS — N20.0 LEFT NEPHROLITHIASIS: ICD-10-CM

## 2022-07-26 LAB
ALBUMIN SERPL-MCNC: 4 G/DL (ref 3.5–5.1)
ALP BLD-CCNC: 171 U/L (ref 38–126)
ALT SERPL-CCNC: 19 U/L (ref 11–66)
ANION GAP SERPL CALCULATED.3IONS-SCNC: 14 MEQ/L (ref 8–16)
AST SERPL-CCNC: 19 U/L (ref 5–40)
BACTERIA: ABNORMAL /HPF
BASOPHILS # BLD: 0.4 %
BASOPHILS ABSOLUTE: 0 THOU/MM3 (ref 0–0.1)
BILIRUB SERPL-MCNC: 0.4 MG/DL (ref 0.3–1.2)
BILIRUBIN URINE: NEGATIVE
BLOOD, URINE: NEGATIVE
BUN BLDV-MCNC: 16 MG/DL (ref 7–22)
CALCIUM SERPL-MCNC: 9.5 MG/DL (ref 8.5–10.5)
CASTS 2: ABNORMAL /LPF
CASTS UA: ABNORMAL /LPF
CHARACTER, URINE: CLEAR
CHLORIDE BLD-SCNC: 104 MEQ/L (ref 98–111)
CO2: 22 MEQ/L (ref 23–33)
COLOR: YELLOW
CREAT SERPL-MCNC: 0.9 MG/DL (ref 0.4–1.2)
CRYSTALS, UA: ABNORMAL
EOSINOPHIL # BLD: 2.3 %
EOSINOPHILS ABSOLUTE: 0.2 THOU/MM3 (ref 0–0.4)
EPITHELIAL CELLS, UA: ABNORMAL /HPF
ERYTHROCYTE [DISTWIDTH] IN BLOOD BY AUTOMATED COUNT: 14.1 % (ref 11.5–14.5)
ERYTHROCYTE [DISTWIDTH] IN BLOOD BY AUTOMATED COUNT: 49 FL (ref 35–45)
GFR SERPL CREATININE-BSD FRML MDRD: 83 ML/MIN/1.73M2
GLUCOSE BLD-MCNC: 187 MG/DL (ref 70–108)
GLUCOSE URINE: NEGATIVE MG/DL
HCT VFR BLD CALC: 41.2 % (ref 42–52)
HEMOGLOBIN: 13.4 GM/DL (ref 14–18)
IMMATURE GRANS (ABS): 0.03 THOU/MM3 (ref 0–0.07)
IMMATURE GRANULOCYTES: 0.4 %
KETONES, URINE: NEGATIVE
LEUKOCYTE ESTERASE, URINE: ABNORMAL
LYMPHOCYTES # BLD: 27.7 %
LYMPHOCYTES ABSOLUTE: 1.9 THOU/MM3 (ref 1–4.8)
MCH RBC QN AUTO: 31.2 PG (ref 26–33)
MCHC RBC AUTO-ENTMCNC: 32.5 GM/DL (ref 32.2–35.5)
MCV RBC AUTO: 95.8 FL (ref 80–94)
MISCELLANEOUS 2: ABNORMAL
MONOCYTES # BLD: 5.9 %
MONOCYTES ABSOLUTE: 0.4 THOU/MM3 (ref 0.4–1.3)
NITRITE, URINE: NEGATIVE
NUCLEATED RED BLOOD CELLS: 0 /100 WBC
OSMOLALITY CALCULATION: 285.5 MOSMOL/KG (ref 275–300)
PH UA: 5.5 (ref 5–9)
PLATELET # BLD: 196 THOU/MM3 (ref 130–400)
PMV BLD AUTO: 10.2 FL (ref 9.4–12.4)
POTASSIUM SERPL-SCNC: 3.8 MEQ/L (ref 3.5–5.2)
PROTEIN UA: NEGATIVE
RBC # BLD: 4.3 MILL/MM3 (ref 4.7–6.1)
RBC URINE: ABNORMAL /HPF
RENAL EPITHELIAL, UA: ABNORMAL
SEG NEUTROPHILS: 63.3 %
SEGMENTED NEUTROPHILS ABSOLUTE COUNT: 4.4 THOU/MM3 (ref 1.8–7.7)
SODIUM BLD-SCNC: 140 MEQ/L (ref 135–145)
SPECIFIC GRAVITY, URINE: 1.02 (ref 1–1.03)
TOTAL PROTEIN: 7.6 G/DL (ref 6.1–8)
UROBILINOGEN, URINE: 0.2 EU/DL (ref 0–1)
WBC # BLD: 7 THOU/MM3 (ref 4.8–10.8)
WBC UA: ABNORMAL /HPF
YEAST: ABNORMAL

## 2022-07-26 PROCEDURE — 6360000002 HC RX W HCPCS: Performed by: STUDENT IN AN ORGANIZED HEALTH CARE EDUCATION/TRAINING PROGRAM

## 2022-07-26 PROCEDURE — 96372 THER/PROPH/DIAG INJ SC/IM: CPT

## 2022-07-26 PROCEDURE — 76376 3D RENDER W/INTRP POSTPROCES: CPT

## 2022-07-26 PROCEDURE — 85025 COMPLETE CBC W/AUTO DIFF WBC: CPT

## 2022-07-26 PROCEDURE — 74176 CT ABD & PELVIS W/O CONTRAST: CPT

## 2022-07-26 PROCEDURE — 80053 COMPREHEN METABOLIC PANEL: CPT

## 2022-07-26 PROCEDURE — 99284 EMERGENCY DEPT VISIT MOD MDM: CPT

## 2022-07-26 PROCEDURE — 81001 URINALYSIS AUTO W/SCOPE: CPT

## 2022-07-26 PROCEDURE — 36415 COLL VENOUS BLD VENIPUNCTURE: CPT

## 2022-07-26 PROCEDURE — 87086 URINE CULTURE/COLONY COUNT: CPT

## 2022-07-26 PROCEDURE — 87186 SC STD MICRODIL/AGAR DIL: CPT

## 2022-07-26 PROCEDURE — 87077 CULTURE AEROBIC IDENTIFY: CPT

## 2022-07-26 RX ORDER — KETOROLAC TROMETHAMINE 10 MG/1
10 TABLET, FILM COATED ORAL EVERY 6 HOURS PRN
Qty: 20 TABLET | Refills: 0 | Status: SHIPPED | OUTPATIENT
Start: 2022-07-26 | End: 2022-10-27

## 2022-07-26 RX ORDER — KETOROLAC TROMETHAMINE 30 MG/ML
15 INJECTION, SOLUTION INTRAMUSCULAR; INTRAVENOUS ONCE
Status: COMPLETED | OUTPATIENT
Start: 2022-07-26 | End: 2022-07-26

## 2022-07-26 RX ORDER — KETOROLAC TROMETHAMINE 30 MG/ML
15 INJECTION, SOLUTION INTRAMUSCULAR; INTRAVENOUS ONCE
Status: DISCONTINUED | OUTPATIENT
Start: 2022-07-26 | End: 2022-07-26

## 2022-07-26 RX ADMIN — KETOROLAC TROMETHAMINE 15 MG: 30 INJECTION, SOLUTION INTRAMUSCULAR; INTRAVENOUS at 13:25

## 2022-07-26 ASSESSMENT — ENCOUNTER SYMPTOMS
CHEST TIGHTNESS: 0
EYE ITCHING: 0
SINUS PAIN: 0
EYE PAIN: 0
ABDOMINAL DISTENTION: 0
CONSTIPATION: 0
EYE DISCHARGE: 0
SHORTNESS OF BREATH: 0
VOMITING: 0
BACK PAIN: 1
NAUSEA: 0
SINUS PRESSURE: 0
ABDOMINAL PAIN: 0
DIARRHEA: 0
RHINORRHEA: 0
EYE REDNESS: 0
COLOR CHANGE: 0

## 2022-07-26 ASSESSMENT — PAIN DESCRIPTION - ONSET: ONSET: ON-GOING

## 2022-07-26 ASSESSMENT — PAIN SCALES - WONG BAKER: WONGBAKER_NUMERICALRESPONSE: 4

## 2022-07-26 ASSESSMENT — PAIN DESCRIPTION - FREQUENCY: FREQUENCY: INTERMITTENT

## 2022-07-26 ASSESSMENT — PAIN DESCRIPTION - ORIENTATION: ORIENTATION: LEFT

## 2022-07-26 ASSESSMENT — PAIN DESCRIPTION - PAIN TYPE: TYPE: ACUTE PAIN

## 2022-07-26 ASSESSMENT — PAIN - FUNCTIONAL ASSESSMENT: PAIN_FUNCTIONAL_ASSESSMENT: ACTIVITIES ARE NOT PREVENTED

## 2022-07-26 ASSESSMENT — PAIN DESCRIPTION - LOCATION: LOCATION: FLANK

## 2022-07-26 ASSESSMENT — PAIN DESCRIPTION - DESCRIPTORS: DESCRIPTORS: DULL;ACHING

## 2022-07-26 NOTE — ED PROVIDER NOTES
Diagnosis Date    Arthritis     ASHD (arteriosclerotic heart disease)     CAD (coronary artery disease)     Status post bypass    Colon polyps 2009      colonoscopy  tubular  adenoma   [email or web address]    Decreased sense of smell 5/22/2015    Epistaxis 5/22/2015    History of blood transfusion 2005    ? ???? with by pass? ?? Hyperlipidemia     Hypertension     Kidney stones     Mild carotid artery disease ([facility] [address] 2013    Nasal obstruction 5/22/2015    Nasal septal perforation 5/22/2015     Past Surgical History:   Procedure Laterality Date    BLADDER SURGERY Right 12/20/2021    CYSTO, URETEROSCOPY, RIGHT RETROGRADE PYELOGRAM, URETERAL DILATION, WITH URETERAL BX RIGHT URETERAL STENT PLACEMENT performed by Brett Goode MD at [place] [address] Right 1/31/2022    CYSTOSCOPY RIGHT RETROGRADE PYELOGRAM, RIGHT STENT EXCHANGE performed by Brett Goode MD at [place] [address] Right 4/4/2022    CYSTOSCOPY, RIGHT STENT EXCHANGE, RIGHT RETROGRADE PYELOGRAM, ROBOTIC RIGHT URETERAL REIMPLANT performed by Brett Goode MD at [place] [address] Right 6/20/2022    Cystoscopy Right Retrograde Pyelogram Right Ureteral Stent Removal performed by Brett Goode MD at [address]  2005    6 by pass    COLONOSCOPY  12/2019    Dr Jasmyne Napoles  colon polyps 8//2014 9-2019    COLONOSCOPY Left 09/13/2019    COLONOSCOPY POLYPECTOMY SNARE/COLD BIOPSY performed by Christiane Yarbrough MD at [address] [place] [address]  11/2005    EXCISION OF AURAL MASS      NASAL POLYP SURGERY  06/05/2015    BIOPSY OF NASAL [address] [place] [address]    on back    TONSILLECTOMY      as a child         MEDICATIONS   No current facility-administered medications for this encounter.     Current Outpatient Medications:     ketorolac (TORADOL) 10 MG tablet, Take 1 tablet by mouth every 6 hours as needed for Pain, Disp: 20 tablet, Rfl: 0    tamsulosin (FLOMAX) 0.4 MG capsule, take 1 capsule by mouth once daily, Disp: 30 capsule, Rfl: 2    hydroCHLOROthiazide (HYDRODIURIL) 25 MG tablet, Take 0.5 tablets by mouth daily, Disp: 90 tablet, Rfl: 1    losartan (COZAAR) 100 MG tablet, TAKE 1 TABLET DAILY, Disp: 90 tablet, Rfl: 2    atorvastatin (LIPITOR) 40 MG tablet, TAKE 1 TABLET DAILY, Disp: 90 tablet, Rfl: 3    metoprolol tartrate (LOPRESSOR) 25 MG tablet, TAKE 1 TABLET TWICE A DAY, Disp: 180 tablet, Rfl: 3    vitamin D (ERGOCALCIFEROL) 1.25 MG (53247 UT) CAPS capsule, TAKE 1 CAPSULE ONCE A WEEK, Disp: 12 capsule, Rfl: 3    aspirin 81 MG EC tablet, Take 81 mg by mouth 2 times daily , Disp: , Rfl:     acetaminophen (TYLENOL ARTHRITIS PAIN) 650 MG extended release tablet, Take 650 mg by mouth every 8 hours as needed for Pain, Disp: , Rfl:     sodium chloride (OCEAN, BABY AYR) 0.65 % nasal spray, 1 spray by Nasal route as needed for Congestion, Disp: , Rfl:     Multiple Vitamin (MULTI-VITAMIN) TABS, Take by mouth daily , Disp: , Rfl:       SOCIAL HISTORY     Social History     Social History Narrative    Not on file     Social History     Tobacco Use    Smoking status: Former     Packs/day: 1.50     Years: 30.00     Pack years: 45.00     Types: Cigarettes     Quit date: 6/3/2002     Years since quittin.1    Smokeless tobacco: Never   Vaping Use    Vaping Use: Never used   Substance Use Topics    Alcohol use: Yes     Comment: seldom    Drug use: No         ALLERGIES   No Known Allergies      FAMILY HISTORY     Family History   Problem Relation Age of Onset    Heart Disease Mother     Cancer Father         throat    Diabetes Sister     Asthma Brother          PREVIOUS RECORDS   Previous records reviewed: The patient has a past medical history of a right kidney stone requiring stents. The right renal stent was recently removed last month. He had a renal ultrasound on 2022 showing probable left-sided nephrolithiasis.         PHYSICAL EXAM 79-year-old male presenting to the emergency department for evaluation of left lower back pain. Differential diagnosis includes was not limited to: Nephrolithiasis, ureterolithiasis, ROBERT, UTI, musculoskeletal back pain. Plan:   IV, labs. Toradol. CT abdomen pelvis without IV contrast with lumbar recons. ED RESULTS   Laboratory results:  Labs Reviewed   CBC WITH AUTO DIFFERENTIAL - Abnormal; Notable for the following components:       Result Value    RBC 4.30 (*)     Hemoglobin 13.4 (*)     Hematocrit 41.2 (*)     MCV 95.8 (*)     RDW-SD 49.0 (*)     All other components within normal limits   COMPREHENSIVE METABOLIC PANEL - Abnormal; Notable for the following components:    Glucose 187 (*)     CO2 22 (*)     Alkaline Phosphatase 171 (*)     All other components within normal limits   GLOMERULAR FILTRATION RATE, ESTIMATED - Abnormal; Notable for the following components:    Est, Glom Filt Rate 83 (*)     All other components within normal limits   URINE WITH REFLEXED MICRO - Abnormal; Notable for the following components:    Leukocyte Esterase, Urine MODERATE (*)     All other components within normal limits   CULTURE, REFLEXED, URINE   ANION GAP   OSMOLALITY       Radiologic studies results:  CT ABDOMEN PELVIS WO CONTRAST Additional Contrast? None   Final Result   1. Obstructing 7 mm distal left ureteral calculus. 2. Cholelithiasis   3. Mild prostatomegaly. 4. Degenerative changes of the lumbar spine. No acute fracture of the lumbar spine. **This report has been created using voice recognition software. It may contain minor errors which are inherent in voice recognition technology. **      Final report electronically signed by Dr Alexa Chinchilla on 7/26/2022 11:55 AM      CT LUMBAR RECONSTRUCTION WO POST PROCESS   Final Result   1. Obstructing 7 mm distal left ureteral calculus. 2. Cholelithiasis   3. Mild prostatomegaly. 4. Degenerative changes of the lumbar spine.  No acute fracture

## 2022-07-26 NOTE — ED PROVIDER NOTES
[address] MEDICINE ATTENDING ATTESTATION      Evaluation of Miky Posey. Case discussed and care plan developed with resident physician. I agree with the resident physician documentation and plan as documented by him, except if my documentation differs. Patient seen, interviewed and examined by me. I reviewed the medical, surgical, family and social history, medications and allergies. I have reviewed the nursing documentation. I have reviewed the patient's vital signs and are abnormal from hypertension  per my interpretation. Body mass index is 32.08 kg/m². Pulsoxymetry is normal per my interpretation. Brief H&P   Patient c/o left flank pain. Patient has history of recent surgery for ureteral stenosis after stones on the right side. Denies pain on the right side. He has follow-up with urology in August 8. Physical exam is notable for well appearing, left CVA tenderness, otherwise nonfocal exam.      Medical Decision Making   MDM:   Flank pain  Possible kidney stone  Plan:   IV line, labs  IV fluids  Analgesia  Imaging  Observation in the ED while awaiting results    Please see the resident physician completed note for final disposition except as documented on this attestation. I have reviewed and interpreted all available lab, radiology and ekg results available at the moment. Diagnosis, treatment and disposition plans were discussed and agreed upon by patient. This transcription was electronically signed. It was dictated by use of voice recognition software and electronically transcribed. The transcription may contain errors not detected in proofreading.      I performed direct supervision and was present for the critical portion following procedures: None  Critical care time on this case: None    Electronically signed by Amando Conde MD on 7/26/22 at 11:56 AM EDT        Amando Conde MD  07/26/22 0225

## 2022-07-26 NOTE — ED NOTES
Pt presents to ED with L flank pain, began last night 9/10 when laying down for bed. Pt states pain is worse when laying down, pain is constant. Currently 4/10.       Joselin Villa  07/26/22 8913

## 2022-07-26 NOTE — DISCHARGE INSTRUCTIONS
Do not hesitate to return to the emergency department if you are experiencing any new or worsening symptoms such as fever, sidney blood in your urine, nausea and vomiting that does not go away, or if you have any other concerns. Go to the urology office tomorrow morning at 8 AM.  Call the office today to schedule an appointment. Let them know that Dr. Reg Carter wants you to be seen tomorrow. Do not eat or drink anything after midnight tonight. Begin taking Toradol as prescribed.

## 2022-07-27 ENCOUNTER — PREP FOR PROCEDURE (OUTPATIENT)
Dept: UROLOGY | Age: 72
End: 2022-07-27

## 2022-07-27 ENCOUNTER — TELEPHONE (OUTPATIENT)
Dept: UROLOGY | Age: 72
End: 2022-07-27

## 2022-07-27 ENCOUNTER — HOSPITAL ENCOUNTER (OUTPATIENT)
Age: 72
Setting detail: OUTPATIENT SURGERY
Discharge: HOME | End: 2022-07-27
Attending: UROLOGY | Admitting: UROLOGY
Payer: MEDICARE

## 2022-07-27 ENCOUNTER — ANESTHESIA (OUTPATIENT)
Dept: OPERATING ROOM | Age: 72
End: 2022-07-27
Payer: MEDICARE

## 2022-07-27 ENCOUNTER — ANESTHESIA EVENT (OUTPATIENT)
Dept: OPERATING ROOM | Age: 72
End: 2022-07-27
Payer: MEDICARE

## 2022-07-27 ENCOUNTER — OFFICE VISIT (OUTPATIENT)
Dept: UROLOGY | Age: 72
End: 2022-07-27
Payer: MEDICARE

## 2022-07-27 VITALS
RESPIRATION RATE: 16 BRPM | TEMPERATURE: 96.5 F | OXYGEN SATURATION: 97 % | WEIGHT: 232.6 LBS | SYSTOLIC BLOOD PRESSURE: 171 MMHG | BODY MASS INDEX: 32.44 KG/M2 | HEART RATE: 69 BPM | DIASTOLIC BLOOD PRESSURE: 82 MMHG

## 2022-07-27 VITALS
SYSTOLIC BLOOD PRESSURE: 146 MMHG | WEIGHT: 232 LBS | TEMPERATURE: 97.8 F | BODY MASS INDEX: 32.48 KG/M2 | DIASTOLIC BLOOD PRESSURE: 74 MMHG | HEIGHT: 71 IN

## 2022-07-27 DIAGNOSIS — G89.18 POST-OPERATIVE PAIN: Primary | ICD-10-CM

## 2022-07-27 DIAGNOSIS — N20.1 LEFT URETERAL STONE: Primary | ICD-10-CM

## 2022-07-27 PROCEDURE — 2500000003 HC RX 250 WO HCPCS: Performed by: REGISTERED NURSE

## 2022-07-27 PROCEDURE — 3700000001 HC ADD 15 MINUTES (ANESTHESIA): Performed by: UROLOGY

## 2022-07-27 PROCEDURE — 6360000002 HC RX W HCPCS: Performed by: REGISTERED NURSE

## 2022-07-27 PROCEDURE — 1123F ACP DISCUSS/DSCN MKR DOCD: CPT | Performed by: UROLOGY

## 2022-07-27 PROCEDURE — 2720000010 HC SURG SUPPLY STERILE: Performed by: UROLOGY

## 2022-07-27 PROCEDURE — C1769 GUIDE WIRE: HCPCS | Performed by: UROLOGY

## 2022-07-27 PROCEDURE — 6370000000 HC RX 637 (ALT 250 FOR IP): Performed by: UROLOGY

## 2022-07-27 PROCEDURE — C1758 CATHETER, URETERAL: HCPCS | Performed by: UROLOGY

## 2022-07-27 PROCEDURE — 99214 OFFICE O/P EST MOD 30 MIN: CPT | Performed by: UROLOGY

## 2022-07-27 PROCEDURE — 2580000003 HC RX 258: Performed by: UROLOGY

## 2022-07-27 PROCEDURE — 7100000010 HC PHASE II RECOVERY - FIRST 15 MIN: Performed by: UROLOGY

## 2022-07-27 PROCEDURE — C2617 STENT, NON-COR, TEM W/O DEL: HCPCS | Performed by: UROLOGY

## 2022-07-27 PROCEDURE — 2709999900 HC NON-CHARGEABLE SUPPLY: Performed by: UROLOGY

## 2022-07-27 PROCEDURE — 3600000013 HC SURGERY LEVEL 3 ADDTL 15MIN: Performed by: UROLOGY

## 2022-07-27 PROCEDURE — 7100000011 HC PHASE II RECOVERY - ADDTL 15 MIN: Performed by: UROLOGY

## 2022-07-27 PROCEDURE — 6360000002 HC RX W HCPCS: Performed by: UROLOGY

## 2022-07-27 PROCEDURE — 3600000003 HC SURGERY LEVEL 3 BASE: Performed by: UROLOGY

## 2022-07-27 PROCEDURE — 3700000000 HC ANESTHESIA ATTENDED CARE: Performed by: UROLOGY

## 2022-07-27 PROCEDURE — 7100000001 HC PACU RECOVERY - ADDTL 15 MIN: Performed by: UROLOGY

## 2022-07-27 PROCEDURE — 7100000000 HC PACU RECOVERY - FIRST 15 MIN: Performed by: UROLOGY

## 2022-07-27 DEVICE — URETERAL STENT
Type: IMPLANTABLE DEVICE | Status: FUNCTIONAL
Brand: PERCUFLEX™ PLUS

## 2022-07-27 RX ORDER — IPRATROPIUM BROMIDE AND ALBUTEROL SULFATE 2.5; .5 MG/3ML; MG/3ML
1 SOLUTION RESPIRATORY (INHALATION)
Status: DISCONTINUED | OUTPATIENT
Start: 2022-07-27 | End: 2022-07-27 | Stop reason: HOSPADM

## 2022-07-27 RX ORDER — LORAZEPAM 2 MG/ML
0.5 INJECTION INTRAMUSCULAR
Status: DISCONTINUED | OUTPATIENT
Start: 2022-07-27 | End: 2022-07-27 | Stop reason: HOSPADM

## 2022-07-27 RX ORDER — LIDOCAINE HYDROCHLORIDE 20 MG/ML
INJECTION, SOLUTION INTRAVENOUS PRN
Status: DISCONTINUED | OUTPATIENT
Start: 2022-07-27 | End: 2022-07-27 | Stop reason: SDUPTHER

## 2022-07-27 RX ORDER — DROPERIDOL 2.5 MG/ML
0.62 INJECTION, SOLUTION INTRAMUSCULAR; INTRAVENOUS
Status: DISCONTINUED | OUTPATIENT
Start: 2022-07-27 | End: 2022-07-27 | Stop reason: HOSPADM

## 2022-07-27 RX ORDER — MEPERIDINE HYDROCHLORIDE 25 MG/ML
12.5 INJECTION INTRAMUSCULAR; INTRAVENOUS; SUBCUTANEOUS EVERY 5 MIN PRN
Status: DISCONTINUED | OUTPATIENT
Start: 2022-07-27 | End: 2022-07-27 | Stop reason: HOSPADM

## 2022-07-27 RX ORDER — FENTANYL CITRATE 50 UG/ML
INJECTION, SOLUTION INTRAMUSCULAR; INTRAVENOUS PRN
Status: DISCONTINUED | OUTPATIENT
Start: 2022-07-27 | End: 2022-07-27 | Stop reason: SDUPTHER

## 2022-07-27 RX ORDER — FENTANYL CITRATE 50 UG/ML
50 INJECTION, SOLUTION INTRAMUSCULAR; INTRAVENOUS EVERY 5 MIN PRN
Status: DISCONTINUED | OUTPATIENT
Start: 2022-07-27 | End: 2022-07-27 | Stop reason: HOSPADM

## 2022-07-27 RX ORDER — HYDROCODONE BITARTRATE AND ACETAMINOPHEN 5; 325 MG/1; MG/1
1 TABLET ORAL ONCE
Status: COMPLETED | OUTPATIENT
Start: 2022-07-27 | End: 2022-07-27

## 2022-07-27 RX ORDER — HYDROCODONE BITARTRATE AND ACETAMINOPHEN 5; 325 MG/1; MG/1
1 TABLET ORAL EVERY 6 HOURS PRN
Qty: 10 TABLET | Refills: 0 | Status: SHIPPED | OUTPATIENT
Start: 2022-07-27 | End: 2022-07-30

## 2022-07-27 RX ORDER — GLYCOPYRROLATE 1 MG/5 ML
SYRINGE (ML) INTRAVENOUS PRN
Status: DISCONTINUED | OUTPATIENT
Start: 2022-07-27 | End: 2022-07-27 | Stop reason: SDUPTHER

## 2022-07-27 RX ORDER — MORPHINE SULFATE 2 MG/ML
2 INJECTION, SOLUTION INTRAMUSCULAR; INTRAVENOUS EVERY 5 MIN PRN
Status: DISCONTINUED | OUTPATIENT
Start: 2022-07-27 | End: 2022-07-27 | Stop reason: HOSPADM

## 2022-07-27 RX ORDER — SULFAMETHOXAZOLE AND TRIMETHOPRIM 800; 160 MG/1; MG/1
1 TABLET ORAL 2 TIMES DAILY
Qty: 14 TABLET | Refills: 0 | Status: SHIPPED | OUTPATIENT
Start: 2022-07-27 | End: 2022-08-03

## 2022-07-27 RX ORDER — SODIUM CHLORIDE 9 MG/ML
INJECTION, SOLUTION INTRAVENOUS CONTINUOUS
Status: DISCONTINUED | OUTPATIENT
Start: 2022-07-27 | End: 2022-07-27 | Stop reason: HOSPADM

## 2022-07-27 RX ORDER — ONDANSETRON 2 MG/ML
INJECTION INTRAMUSCULAR; INTRAVENOUS PRN
Status: DISCONTINUED | OUTPATIENT
Start: 2022-07-27 | End: 2022-07-27 | Stop reason: SDUPTHER

## 2022-07-27 RX ORDER — ONDANSETRON 2 MG/ML
4 INJECTION INTRAMUSCULAR; INTRAVENOUS
Status: DISCONTINUED | OUTPATIENT
Start: 2022-07-27 | End: 2022-07-27 | Stop reason: HOSPADM

## 2022-07-27 RX ORDER — DIPHENHYDRAMINE HYDROCHLORIDE 50 MG/ML
12.5 INJECTION INTRAMUSCULAR; INTRAVENOUS
Status: DISCONTINUED | OUTPATIENT
Start: 2022-07-27 | End: 2022-07-27 | Stop reason: HOSPADM

## 2022-07-27 RX ORDER — DEXAMETHASONE SODIUM PHOSPHATE 10 MG/ML
INJECTION, EMULSION INTRAMUSCULAR; INTRAVENOUS PRN
Status: DISCONTINUED | OUTPATIENT
Start: 2022-07-27 | End: 2022-07-27 | Stop reason: SDUPTHER

## 2022-07-27 RX ORDER — SODIUM CHLORIDE 0.9 % (FLUSH) 0.9 %
5-40 SYRINGE (ML) INJECTION EVERY 12 HOURS SCHEDULED
Status: DISCONTINUED | OUTPATIENT
Start: 2022-07-27 | End: 2022-07-27 | Stop reason: HOSPADM

## 2022-07-27 RX ORDER — SODIUM CHLORIDE 0.9 % (FLUSH) 0.9 %
5-40 SYRINGE (ML) INJECTION PRN
Status: DISCONTINUED | OUTPATIENT
Start: 2022-07-27 | End: 2022-07-27 | Stop reason: HOSPADM

## 2022-07-27 RX ORDER — LABETALOL 20 MG/4 ML (5 MG/ML) INTRAVENOUS SYRINGE
10
Status: DISCONTINUED | OUTPATIENT
Start: 2022-07-27 | End: 2022-07-27 | Stop reason: HOSPADM

## 2022-07-27 RX ORDER — SODIUM CHLORIDE 9 MG/ML
INJECTION, SOLUTION INTRAVENOUS CONTINUOUS
Status: CANCELLED | OUTPATIENT
Start: 2022-07-27

## 2022-07-27 RX ORDER — HYDRALAZINE HYDROCHLORIDE 20 MG/ML
10 INJECTION INTRAMUSCULAR; INTRAVENOUS
Status: DISCONTINUED | OUTPATIENT
Start: 2022-07-27 | End: 2022-07-27 | Stop reason: HOSPADM

## 2022-07-27 RX ORDER — PROPOFOL 10 MG/ML
INJECTION, EMULSION INTRAVENOUS PRN
Status: DISCONTINUED | OUTPATIENT
Start: 2022-07-27 | End: 2022-07-27 | Stop reason: SDUPTHER

## 2022-07-27 RX ORDER — SODIUM CHLORIDE 9 MG/ML
25 INJECTION, SOLUTION INTRAVENOUS PRN
Status: DISCONTINUED | OUTPATIENT
Start: 2022-07-27 | End: 2022-07-27 | Stop reason: HOSPADM

## 2022-07-27 RX ADMIN — Medication 0.2 MG: at 12:38

## 2022-07-27 RX ADMIN — HYDROCODONE BITARTRATE AND ACETAMINOPHEN 1 TABLET: 5; 325 TABLET ORAL at 14:07

## 2022-07-27 RX ADMIN — DEXAMETHASONE SODIUM PHOSPHATE 10 MG: 10 INJECTION, EMULSION INTRAMUSCULAR; INTRAVENOUS at 12:22

## 2022-07-27 RX ADMIN — CEFAZOLIN 2000 MG: 10 INJECTION, POWDER, FOR SOLUTION INTRAVENOUS at 12:29

## 2022-07-27 RX ADMIN — ONDANSETRON 4 MG: 2 INJECTION INTRAMUSCULAR; INTRAVENOUS at 12:22

## 2022-07-27 RX ADMIN — LIDOCAINE HYDROCHLORIDE 100 MG: 20 INJECTION, SOLUTION INTRAVENOUS at 12:22

## 2022-07-27 RX ADMIN — FENTANYL CITRATE 50 MCG: 50 INJECTION, SOLUTION INTRAMUSCULAR; INTRAVENOUS at 12:22

## 2022-07-27 RX ADMIN — FENTANYL CITRATE 50 MCG: 50 INJECTION, SOLUTION INTRAMUSCULAR; INTRAVENOUS at 12:43

## 2022-07-27 RX ADMIN — PROPOFOL 200 MG: 10 INJECTION, EMULSION INTRAVENOUS at 12:22

## 2022-07-27 RX ADMIN — FENTANYL CITRATE 50 MCG: 50 INJECTION, SOLUTION INTRAMUSCULAR; INTRAVENOUS at 12:30

## 2022-07-27 RX ADMIN — FENTANYL CITRATE 50 MCG: 50 INJECTION, SOLUTION INTRAMUSCULAR; INTRAVENOUS at 12:57

## 2022-07-27 RX ADMIN — SODIUM CHLORIDE: 9 INJECTION, SOLUTION INTRAVENOUS at 11:10

## 2022-07-27 ASSESSMENT — PAIN SCALES - GENERAL: PAINLEVEL_OUTOF10: 5

## 2022-07-27 ASSESSMENT — PAIN DESCRIPTION - DESCRIPTORS: DESCRIPTORS: ACHING;DISCOMFORT

## 2022-07-27 ASSESSMENT — PAIN DESCRIPTION - ORIENTATION: ORIENTATION: LEFT

## 2022-07-27 ASSESSMENT — PAIN - FUNCTIONAL ASSESSMENT: PAIN_FUNCTIONAL_ASSESSMENT: 0-10

## 2022-07-27 ASSESSMENT — PAIN DESCRIPTION - LOCATION: LOCATION: ARM

## 2022-07-27 NOTE — ANESTHESIA PRE PROCEDURE
Department of Anesthesiology  Preprocedure Note       Name:  Radha Colvin   Age:  67 y.o.  :  1950                                          MRN:  [id number]         Date:  2022      Surgeon: Adrian Smith):  Benjamin Workman MD    Procedure: Procedure(s):  CYSTO, LEFT URETEROSCOPY, POSS LASER LITHOTRIPSY, BASKET RETRIEVAL OF STONE FRAGMENTS, POSS STENT    Medications prior to admission:   Prior to Admission medications    Medication Sig Start Date End Date Taking?  Authorizing Provider   ketorolac (TORADOL) 10 MG tablet Take 1 tablet by mouth every 6 hours as needed for Pain 22   René Gutierrez DO   tamsulosin [facility]) 0.4 MG capsule take 1 capsule by mouth once daily 22   RASHAD Little CNP   hydroCHLOROthiazide (HYDRODIURIL) 25 MG tablet Take 0.5 tablets by mouth daily 4/20/22 10/17/22  Jovanny Edmond MD   losartan (COZAAR) 100 MG tablet TAKE 1 TABLET DAILY 22   Jovanny Edmond MD   atorvastatin (LIPITOR) 40 MG tablet TAKE 1 TABLET DAILY 3/5/22   Mayra Blizzard, APRN - CNP   metoprolol tartrate (LOPRESSOR) 25 MG tablet TAKE 1 TABLET TWICE A DAY 3/5/22   Mayra Blizzard, APRN - CNP   vitamin D (ERGOCALCIFEROL) 1.25 MG (59015 UT) CAPS capsule TAKE 1 CAPSULE ONCE A WEEK 22   Mayra Blizzard, APRN - CNP   aspirin 81 MG EC tablet Take 81 mg by mouth 2 times daily     Historical Provider, MD   acetaminophen (TYLENOL) 650 MG extended release tablet Take 650 mg by mouth every 8 hours as needed for Pain    Historical Provider, MD   sodium chloride (OCEAN, BABY AYR) 0.65 % nasal spray 1 spray by Nasal route as needed for Congestion    Historical Provider, MD   Multiple Vitamin (MULTI-VITAMIN) TABS Take by mouth daily     Historical Provider, MD       Current medications:    Current Facility-Administered Medications   Medication Dose Route Frequency Provider Last Rate Last Admin    0.9 % sodium chloride infusion   IntraVENous Continuous Benjamin Workman  mL/hr at 22 [facility] at 07/27/22 1110    ceFAZolin (ANCEF) 2000 mg in dextrose 5 % 50 mL IVPB  2,000 mg IntraVENous 30 Min Pre-Op Daryl Ceron MD           Allergies:  No Known Allergies    Problem List:    Patient Active Problem List   Diagnosis Code    CAD (coronary artery disease) I25.10    Hypertension I10    Hyperlipidemia E78.5    S/P CABG x 6,2007 Z95.1    CAD (coronary artery disease) I25.10    Colon polyps K63.5    Ureteral stenosis, right N13.5    Ureteral stricture N13.5       Past Medical History:        Diagnosis Date    Arthritis     ASHD (arteriosclerotic heart disease)     CAD (coronary artery disease)     Status post bypass    Colon polyps 2009      colonoscopy  tubular  adenoma   rinesmith    Decreased sense of smell 5/22/2015    Epistaxis 5/22/2015    History of blood transfusion 2005    ? ???? with by pass? ??    Hyperlipidemia     Hypertension     Kidney stones     Mild carotid artery disease ([facility] [address] 2013    Nasal obstruction 5/22/2015    Nasal septal perforation 5/22/2015       Past Surgical History:        Procedure Laterality Date    BLADDER SURGERY Right 12/20/2021    CYSTO, URETEROSCOPY, RIGHT RETROGRADE PYELOGRAM, URETERAL DILATION, WITH URETERAL BX RIGHT URETERAL STENT PLACEMENT performed by Chencho Cruz., MD at [facility] Right 1/31/2022    CYSTOSCOPY RIGHT RETROGRADE PYELOGRAM, RIGHT STENT EXCHANGE performed by Chencho Cruz., MD at [facility] Right 4/4/2022    CYSTOSCOPY, RIGHT STENT EXCHANGE, RIGHT RETROGRADE PYELOGRAM, ROBOTIC RIGHT URETERAL REIMPLANT performed by Chencho Cruz., MD at [facility] 6/20/2022    Cystoscopy Right Retrograde Pyelogram Right Ureteral Stent Removal performed by Chencho Cruz., MD at [address]  2005    6 by pass    COLONOSCOPY  12/2019    Dr Christy Aguilera  colon polyps 8//2014 9-2019    COLONOSCOPY Left 09/13/2019    COLONOSCOPY POLYPECTOMY SNARE/COLD BIOPSY [facility]    CORONARY ARTERY BYPASS GRAFT  2005    EXCISION OF AURAL MASS      NASAL POLYP SURGERY  2015    BIOPSY OF NASAL POLYP MIDDLE TURBINATE AND BIOPSY SEPTAL [address]    SKIN CANCER EXCISION  2018    on back    TONSILLECTOMY      as a child       Social History:    Social History     Tobacco Use    Smoking status: Former     Packs/day: 1.50     Years: 30.00     Pack years: 45.00     Types: Cigarettes     Quit date: 6/3/2002     Years since quittin.1    Smokeless tobacco: Never   Substance Use Topics    Alcohol use: Yes     Comment: seldom                                Counseling given: Not Answered      Vital Signs (Current):   Vitals:    22 1035   BP: (!) 169/83   Pulse: 68   Resp: 20   Temp: 97.2 °F (36.2 °C)   TempSrc: Tympanic   SpO2: 94%   Weight: 232 lb 9.6 oz (105.5 kg)                                              BP Readings from Last 3 Encounters:   22 (!) 169/83   22 (!) 146/74   22 (!) 153/82       NPO Status: Time of last liquid consumption: 530                        Time of last solid consumption:                         Date of last liquid consumption: 22                        Date of last solid food consumption: 22    BMI:   Wt Readings from Last 3 Encounters:   22 232 lb 9.6 oz (105.5 kg)   22 232 lb (105.2 kg)   22 230 lb (104.3 kg)     Body mass index is 32.44 kg/m².     CBC:   Lab Results   Component Value Date/Time    WBC 7.0 2022 10:00 AM    RBC 4.30 2022 10:00 AM    RBC 4.44 2020 08:11 AM    RBC 3 2020 08:11 AM    HGB 13.4 2022 10:00 AM    HCT 41.2 2022 10:00 AM    MCV 95.8 2022 10:00 AM    RDW 13.8 2020 08:11 AM     2022 10:00 AM       CMP:   Lab Results   Component Value Date/Time     2022 10:00 AM    K 3.8 2022 10:00 AM    K 4.8 2021 04:05 PM     2022 10:00 AM    CO2 22 07/26/2022 10:00 AM    BUN 16 07/26/2022 10:00 AM    CREATININE 0.9 07/26/2022 10:00 AM    LABGLOM 83 07/26/2022 10:00 AM    GLUCOSE 187 07/26/2022 10:00 AM    GLUCOSE 97 06/09/2020 08:11 AM    PROT 7.6 07/26/2022 10:00 AM    CALCIUM 9.5 07/26/2022 10:00 AM    BILITOT 0.4 07/26/2022 10:00 AM    BILITOT Negative 06/09/2020 08:11 AM    ALKPHOS 171 07/26/2022 10:00 AM    AST 19 07/26/2022 10:00 AM    ALT 19 07/26/2022 10:00 AM       POC Tests: No results for input(s): POCGLU, POCNA, POCK, POCCL, POCBUN, POCHEMO, POCHCT in the last 72 hours. Coags:   Lab Results   Component Value Date/Time    INR 1.09 06/03/2015 02:20 PM    APTT 25.7 06/03/2015 02:20 PM       HCG (If Applicable): No results found for: PREGTESTUR, PREGSERUM, HCG, HCGQUANT     ABGs: No results found for: PHART, PO2ART, DJQ0GWX, HPR4KED, BEART, [id number]     Type & Screen (If Applicable):  No results found for: LABABO, LABRH    Drug/Infectious Status (If Applicable):  No results found for: HIV, HEPCAB    COVID-19 Screening (If Applicable): No results found for: COVID19        Anesthesia Evaluation    Airway: Mallampati: II          Dental:          Pulmonary:       (-) COPD                           Cardiovascular:    (+) hypertension:, CAD:, CABG/stent:,                   Neuro/Psych:               GI/Hepatic/Renal:             Endo/Other:                     Abdominal:             Vascular: Other Findings:           Anesthesia Plan      general     ASA 3       Induction: intravenous. Anesthetic plan and risks discussed with patient.                         Padmini Garrett MD   7/27/2022

## 2022-07-27 NOTE — PROGRESS NOTES
1305- pt to pacu, nasal airway present, pt not responding to name, resp easy, VSS, pt appears in no acute distress  1315- nasal airway removed, pt responding to name, pt A&O x4, resp easy and unlabored, pt denies pain, pt appears in no acute distress  1323- pt resting in bed with eyes opened, resp easy and unlabored, pt denies pain, pt appears in no acute distress  1335- pt meets criteria for discharge from pacu, pt transported to [facility], report given to Tom

## 2022-07-27 NOTE — ANESTHESIA POSTPROCEDURE EVALUATION
Department of Anesthesiology  Postprocedure Note    Patient: Mic Kan  MRN: [id number]  YOB: 1950  Date of evaluation: 7/27/2022      Procedure Summary     Date: 07/27/22 Room / Location: [facility] / Brian Butler    Anesthesia Start: 7399 Anesthesia Stop: 2547    Procedure: CYSTO, LEFT URETEROSCOPY, LASER LITHOTRIPSY, BASKET RETRIEVAL OF STONE FRAGMENTS, STENT (Left) Diagnosis:       Left ureteral stone      (Left ureteral stone [N20.1])    Surgeons: Eva Lobo MD Responsible Provider: Mageu Hurtado MD    Anesthesia Type: general ASA Status: 3          Anesthesia Type: No value filed.     Moira Phase I: Moira Score: 10    Moira Phase II: Moira Score: 10      Anesthesia Post Evaluation    Complications: no

## 2022-07-27 NOTE — PROGRESS NOTES
[facility] [id number]  Dept: [phone]  Dept Fax: [phone]  Loc: [address] [facility] Urology Office Note -     Patient:  Jm Chávez  YOB: 1950    The patient is a 67 y.o. male who presents today for evaluation of the following problems:   Chief Complaint   Patient presents with    Follow-up     ER FU Kidney stone         History of Present Illness:    Left ureteral stone  7 mm distal stone  Uncomfortable  Hx of reimplant on right side with Dr Jack Pod          Requested/reviewed records from Regina Moreau MD office and/or outside [de-identified]    (Patient's old records have been requested, reviewed and pertinent findings summarized in today's note.)    Procedures Today: N/A    Last several PSA's:  Lab Results   Component Value Date    PSA 5.11 (H) 12/17/2021    PSA 3.89 (H) 07/10/2019    PSA 4.18 (H) 05/17/2018       Last total testosterone:  No results found for: TESTOSTERONE    Urinalysis today:  No results found for this visit on 07/27/22. Last BUN and creatinine:  Lab Results   Component Value Date    BUN 16 07/26/2022     Lab Results   Component Value Date    CREATININE 0.9 07/26/2022       Imaging Reviewed during this Office Visit:   Gladys Palma MD independently reviewed the images and verified the radiology reports from:    CT ABDOMEN PELVIS WO CONTRAST Additional Contrast? None    Result Date: 7/26/2022  PROCEDURE: CT ABDOMEN PELVIS WO CONTRAST, CT LUMBAR RECONSTRUCTION WO POST PROCESS CLINICAL INFORMATION: Left lower back pain. History of probable left renal stone seen on recent renal ultrasound. COMPARISON: CT abdomen and pelvis 31/39/7277 TECHNIQUE: Helical CT acquisition of the abdomen and pelvis was performed without intravenous contrast. Multiplanar reformats are provided.  All CT scans at this facility use dose modulation, iterative reconstruction, and/or weight based dosing when appropriate to reduce the radiation dose to as low as reasonably achievable. FINDINGS: The noncontrast CT limits the evaluation for solid organ pathology, vascular pathology, and lymphadenopathy. Peripheral reticular and honeycombing opacities are seen in the lung bases. Gallstones are present. 2 cm right adrenal adenoma. 1.8 cm left adrenal adenoma. Moderate atrophy of the pancreas. No focal hepatic mass. Calcified splenic granuloma. Aortoiliac calcifications. A 7 mm obstructing distal left ureteral calculus is seen. A few tiny radiodensities are seen in the native distal right ureter. Relates to postoperative changes. No bowel obstruction or acute inflammatory bowel process. The appendix is unremarkable. The abdominal aorta is not aneurysmal. No significantly enlarged lymph nodes are seen. The bladder is grossly unremarkable. The prostate is mildly enlarged. Prostatic calcifications are seen Bones: The bones are demineralized. Degenerative changes of the SI joints and both hips. CT LUMBAR SPINE: Multilevel degenerative changes of the lumbar spine. No acute fracture of the lumbar spine. Marked narrowing of the disc space at L4-L5 and L5-S1. L1-L2: Mild facet arthrosis. Mild disc bulge. Mild central canal narrowing. Mild left neural foraminal narrowing. L2-L3: Mild facet arthrosis. No significant central canal narrowing. Mild left neural foraminal narrowing. L3-L4: Facet arthrosis. No significant central canal narrowing. Mild left neural foraminal narrowing. L4-L5: Facet arthrosis. Mild broad-based disc bulge. Ligamentum flavum flava hypertrophy. Mild central canal narrowing. Mild right neural foramina narrowing. L5-S1: Facet arthrosis. Broad-based disc bulge. No significant central canal narrowing. Mild bilateral neuroforamina narrowing. 1. Obstructing 7 mm distal left ureteral calculus. 2. Cholelithiasis 3. Mild prostatomegaly.  4. Degenerative changes of the lumbar spine. No acute fracture of the lumbar spine. **This report has been created using voice recognition software. It may contain minor errors which are inherent in voice recognition technology. ** Final report electronically signed by Dr Irasema Bruner on 7/26/2022 11:55 AM        PAST MEDICAL, FAMILY AND SOCIAL HISTORY:  Past Medical History:   Diagnosis Date    Arthritis     ASHD (arteriosclerotic heart disease)     CAD (coronary artery disease)     Status post bypass    Colon polyps 2009      colonoscopy  tubular  adenoma   rinesmith    Decreased sense of smell 5/22/2015    Epistaxis 5/22/2015    History of blood transfusion 2005    ? ???? with by pass? ??     Hyperlipidemia     Hypertension     Kidney stones     Mild carotid artery disease ([facility] [address] 2013    Nasal obstruction 5/22/2015    Nasal septal perforation 5/22/2015     Past Surgical History:   Procedure Laterality Date    BLADDER SURGERY Right 12/20/2021    CYSTO, URETEROSCOPY, RIGHT RETROGRADE PYELOGRAM, URETERAL DILATION, WITH URETERAL BX RIGHT URETERAL STENT PLACEMENT performed by Pavel Thompson MD at [address] [place] [address] Right 1/31/2022    CYSTOSCOPY RIGHT RETROGRADE PYELOGRAM, RIGHT STENT EXCHANGE performed by Pavel Thompson MD at [address] [place] [address] Right 4/4/2022    CYSTOSCOPY, RIGHT STENT EXCHANGE, RIGHT RETROGRADE PYELOGRAM, ROBOTIC RIGHT URETERAL REIMPLANT performed by Pavel Thompson MD at [address] [place] [address] Right 6/20/2022    Cystoscopy Right Retrograde Pyelogram Right Ureteral Stent Removal performed by Pavel Thompson MD at [address]  2005    6 by pass    COLONOSCOPY  12/2019    Dr Demetrius Montano  colon polyps 8//2014 9-2019    COLONOSCOPY Left 09/13/2019    COLONOSCOPY POLYPECTOMY SNARE/COLD BIOPSY performed by Melba Powers MD at [address] [place] [address]  11/2005    [address] [facility]  06/05/2015    BIOPSY OF NASAL POLYP MIDDLE [address]    SKIN CANCER EXCISION  2018    on back    TONSILLECTOMY      as a child     Family History   Problem Relation Age of Onset    Heart Disease Mother     Cancer Father         throat    Diabetes Sister     Asthma Brother      Outpatient Medications Marked as Taking for the 22 encounter (Office Visit) with Roel Red MD   Medication Sig Dispense Refill    ketorolac (TORADOL) 10 MG tablet Take 1 tablet by mouth every 6 hours as needed for Pain 20 tablet 0    tamsulosin (FLOMAX) 0.4 MG capsule take 1 capsule by mouth once daily 30 capsule 2    hydroCHLOROthiazide (HYDRODIURIL) 25 MG tablet Take 0.5 tablets by mouth daily 90 tablet 1    losartan (COZAAR) 100 MG tablet TAKE 1 TABLET DAILY 90 tablet 2    atorvastatin (LIPITOR) 40 MG tablet TAKE 1 TABLET DAILY 90 tablet 3    metoprolol tartrate (LOPRESSOR) 25 MG tablet TAKE 1 TABLET TWICE A  tablet 3    vitamin D (ERGOCALCIFEROL) 1.25 MG (95658 UT) CAPS capsule TAKE 1 CAPSULE ONCE A WEEK 12 capsule 3    aspirin 81 MG EC tablet Take 81 mg by mouth 2 times daily       acetaminophen (TYLENOL) 650 MG extended release tablet Take 650 mg by mouth every 8 hours as needed for Pain      sodium chloride (OCEAN, BABY AYR) 0.65 % nasal spray 1 spray by Nasal route as needed for Congestion      Multiple Vitamin (MULTI-VITAMIN) TABS Take by mouth daily          Patient has no known allergies.   Social History     Tobacco Use   Smoking Status Former    Packs/day: 1.50    Years: 30.00    Pack years: 45.00    Types: Cigarettes    Quit date: 6/3/2002    Years since quittin.1   Smokeless Tobacco Never      (If patient a smoker, smoking cessation counseling offered)   Social History     Substance and Sexual Activity   Alcohol Use Yes    Comment: seldom       REVIEW OF SYSTEMS:  Constitutional: negative  Eyes: negative  Respiratory: negative  Cardiovascular: negative  Gastrointestinal: negative  Genitourinary: see HPI  Musculoskeletal: negative  Skin: negative   Neurological: negative  Hematological/Lymphatic: negative  Psychological: negative        Physical Exam:    This a 67 y.o. male  Vitals:    07/27/22 0757   BP: (!) 146/74   Temp: 97.8 °F (36.6 °C)     Body mass index is 32.36 kg/m². Constitutional: Patient in no acute distress;         Assessment and Plan        1. Left ureteral stone               Plan:      Severe pain  Offered intervention  Will arrange for treatment      Prescriptions Ordered:  No orders of the defined types were placed in this encounter. Orders Placed:  No orders of the defined types were placed in this encounter.            Rosalva Gutierrez MD

## 2022-07-27 NOTE — H&P
Ayesha Santos MD  History and Physical    Patient:  Corin Savage  MRN: [id number]  YOB: 1950    HISTORY OF PRESENT ILLNESS:     The patient is a 67 y.o. male who presents with left ureteral stone. Here for procedure. Patient's old records, notes and chart reviewed and summarized above. Ayesha Santos MD independently reviewed the images and verified the radiology reports from:    No results found. Past Medical History:    Past Medical History:   Diagnosis Date    Arthritis     ASHD (arteriosclerotic heart disease)     CAD (coronary artery disease)     Status post bypass    Colon polyps 2009      colonoscopy  tubular  adenoma   rinesmith    Decreased sense of smell 5/22/2015    Epistaxis 5/22/2015    History of blood transfusion 2005    ? ???? with by pass? ??     Hyperlipidemia     Hypertension     Kidney stones     Mild carotid artery disease ([facility] [address] 2013    Nasal obstruction 5/22/2015    Nasal septal perforation 5/22/2015       Past Surgical History:    Past Surgical History:   Procedure Laterality Date    BLADDER SURGERY Right 12/20/2021    CYSTO, URETEROSCOPY, RIGHT RETROGRADE PYELOGRAM, URETERAL DILATION, WITH URETERAL BX RIGHT URETERAL STENT PLACEMENT performed by Ulysses Grieve., MD at [address] [place] [address] Right 1/31/2022    CYSTOSCOPY RIGHT RETROGRADE PYELOGRAM, RIGHT STENT EXCHANGE performed by Ulysses Grieve., MD at [address] [place] [address] Right 4/4/2022    CYSTOSCOPY, RIGHT STENT EXCHANGE, RIGHT RETROGRADE PYELOGRAM, ROBOTIC RIGHT URETERAL REIMPLANT performed by Ulysses Grieve., MD at [address] [place] [address] Right 6/20/2022    Cystoscopy Right Retrograde Pyelogram Right Ureteral Stent Removal performed by Ulysses Grieve., MD at [address]  2005    6 by pass    COLONOSCOPY  12/2019    Dr Rut Johnson  colon polyps 8//2014 9-2019    COLONOSCOPY Left 09/13/2019    COLONOSCOPY POLYPECTOMY SNARE/COLD BIOPSY performed by Lex Diaz MD Ir[place] at [address] [place]  11/2005    EXCISION OF AURAL MASS      NASAL POLYP SURGERY  06/05/2015    BIOPSY OF NASAL POLYP MIDDLE TURBINATE AND BIOPSY SEPTAL [address]    SKIN CANCER EXCISION  2018    on back    TONSILLECTOMY      as a child       Medications Prior to Admission:    Prior to Admission medications    Medication Sig Start Date End Date Taking? Authorizing Provider   ketorolac (TORADOL) 10 MG tablet Take 1 tablet by mouth every 6 hours as needed for Pain 7/26/22   Lesly Lizama DO   tamsulosin [facility]) 0.4 MG capsule take 1 capsule by mouth once daily 7/13/22   RASHAD Briceno CNP   hydroCHLOROthiazide (HYDRODIURIL) 25 MG tablet Take 0.5 tablets by mouth daily 4/20/22 10/17/22  Britni Mena MD   losartan (COZAAR) 100 MG tablet TAKE 1 TABLET DAILY 4/12/22   Britni Mena MD   atorvastatin (LIPITOR) 40 MG tablet TAKE 1 TABLET DAILY 3/5/22   RASHAD Giordano CNP   metoprolol tartrate (LOPRESSOR) 25 MG tablet TAKE 1 TABLET TWICE A DAY 3/5/22   RASHAD Giordano CNP   vitamin D (ERGOCALCIFEROL) 1.25 MG (02130 UT) CAPS capsule TAKE 1 CAPSULE ONCE A WEEK 2/21/22   RASHAD Giordano CNP   aspirin 81 MG EC tablet Take 81 mg by mouth 2 times daily     Historical Provider, MD   acetaminophen (TYLENOL) 650 MG extended release tablet Take 650 mg by mouth every 8 hours as needed for Pain    Historical Provider, MD   sodium chloride (OCEAN, BABY AYR) 0.65 % nasal spray 1 spray by Nasal route as needed for Congestion    Historical Provider, MD   Multiple Vitamin (MULTI-VITAMIN) TABS Take by mouth daily     Historical Provider, MD       Allergies:  Patient has no known allergies.     Social History:    Social History     Socioeconomic History    Marital status:      Spouse name: Not on file    Number of children: Not on file    Years of education: Not on file    Highest education level: Not on file   Occupational History Not on file   Tobacco Use    Smoking status: Former     Packs/day: 1.50     Years: 30.00     Pack years: 45.00     Types: Cigarettes     Quit date: 6/3/2002     Years since quittin.1    Smokeless tobacco: Never   Vaping Use    Vaping Use: Never used   Substance and Sexual Activity    Alcohol use: Yes     Comment: seldom    Drug use: No    Sexual activity: Not Currently     Partners: Female   Other Topics Concern    Not on file   Social History Narrative    Not on file     Social Determinants of Health     Financial Resource Strain: Not on file   Food Insecurity: Not on file   Transportation Needs: Not on file   Physical Activity: Not on file   Stress: Not on file   Social Connections: Not on file   Intimate Partner Violence: Not on file   Housing Stability: Not on file       Family History:    Family History   Problem Relation Age of Onset    Heart Disease Mother     Cancer Father         throat    Diabetes Sister     Asthma Brother        REVIEW OF SYSTEMS:  Constitutional: negative  Eyes: negative  Respiratory: negative  Cardiovascular: negative  Gastrointestinal: negative  Genitourinary: no acute issues  Musculoskeletal: negative  Skin: negative   Neurological: negative  Hematological/Lymphatic: negative  Psychological: negative    Physical Exam:      No data found. Constitutional: Patient in no acute distress; Neuro: alert and oriented to person place and time. Psych: Mood and affect normal.  Skin: Normal  Lungs: Respiratory effort normal, CTA  Cardiovascular:  Normal peripheral pulses; no murmur. Normal rhythm  Abdomen: Soft, non-tender, non-distended with no CVA, flank pain, hepatosplenomegaly or hernia. Kidneys normal.  Bladder non-tender and not distended.       LABS:   Recent Labs     22  1000   WBC 7.0   HGB 13.4*   HCT 41.2*   MCV 95.8*        Recent Labs     22  1000      K 3.8      CO2 22*   BUN 16   CREATININE 0.9     Lab Results   Component Value Date    PSA 5.11 (H) 12/17/2021    PSA 3.89 (H) 07/10/2019    PSA 4.18 (H) 05/17/2018         Urinalysis:   Recent Labs     07/26/22  1040   COLORU YELLOW   PHUR 5.5   WBCUA 25-50   RBCUA 0-2   YEAST NONE SEEN   BACTERIA NONE SEEN   LEUKOCYTESUR MODERATE*   UROBILINOGEN 0.2   BILIRUBINUR NEGATIVE   BLOODU NEGATIVE        -----------------------------------------------------------------      Assessment and Plan     Impression:    Patient Active Problem List   Diagnosis    CAD (coronary artery disease)    Hypertension    Hyperlipidemia    S/P CABG x 6,2007    CAD (coronary artery disease)    Colon polyps    Ureteral stenosis, right    Ureteral stricture       Plan:     Consent obtained; distal left ureteroscopy in OR today    Tanisha Jay MD  10:05 AM 7/27/2022

## 2022-07-27 NOTE — DISCHARGE INSTRUCTIONS
Norco for pain  Bactrim antibiotic take until completion  Can remove stent in 7 days    Hold aspirin for 3 days  Call for follow up

## 2022-07-27 NOTE — PROGRESS NOTES
Pt admitted to [facility] room 14 and oriented to unit. SCD sleeves applied. Nares swabbed. Pt verbalized permission for first name, last initial and physicians name on white board. SDS board and discharge criteria explained, pt and family verbalized understanding. Pt denies thoughts of harming self or others. Call light in reach. Family at the bedside.

## 2022-07-27 NOTE — TELEPHONE ENCOUNTER
SURGERY 8[address]  [address] [place] [address] [address] [facility] [address] [facility], [address]      Phone *[phone] *[phone]   Surgical Scheduling Direct Line Phone *[phone] Fax *[phone]      Saida Harris 1950 male    Annabel Lubin 54 [address]   Marital Status:          Home Phone: [phone]      Cell Phone:    Telephone Information:   Mobile [phone]          Surgeon: Dr. Rashid Humphries Surgery Date: 7/27/2022   Time: 12:00pm    Procedure: Cystoscopy, left ureteroscopy, laser lithotripsy, basket retrieval of stone fragments, and possible left ureteral stent placement. Diagnosis: Kidney Stone     Important Medical History:  In [facility]    Special Inst/Equip:     CPT Codes:    05199  Latex Allergy: No     Cardiac Device:  No    Anesthesia:  General          Admission Type:  Same Day                        Admit Prior to Day of Surgery: No    Case Location:  Main OR            Preadmission Testing:  Phone Call          PAT Date and Time:______________________________________________________    PAT Confirmation #: ______________________________________________________    Post Op Visit: ___________________________________________________________    Need Preop Cardiac Clearance: Yes    Does Patient have Cardiologist/physician?      Dr. Moises Mejias    Surgery Confirmation #: __________________________________________________    [address]: ________________________   Date: __________________________     Insurance Company Name: [employer]

## 2022-07-28 DIAGNOSIS — N20.1 LEFT URETERAL STONE: Primary | ICD-10-CM

## 2022-07-28 DIAGNOSIS — R33.9 URINARY RETENTION: ICD-10-CM

## 2022-07-28 LAB
ORGANISM: ABNORMAL
URINE CULTURE REFLEX: ABNORMAL

## 2022-07-29 ENCOUNTER — TELEPHONE (OUTPATIENT)
Dept: UROLOGY | Age: 72
End: 2022-07-29

## 2022-07-29 NOTE — TELEPHONE ENCOUNTER
Kurtis Deems lvm stating that pain during the night was severe. Upon returning the call he stated that it kept him up all night but that it has lessoned. Instructed to increase water intake as much as possible to keep flushing out kidneys. If pain persists and unable to control it to go to the er to be evaluated. Pt verbalized understanding.

## 2022-08-03 ENCOUNTER — OFFICE VISIT (OUTPATIENT)
Dept: FAMILY MEDICINE CLINIC | Age: 72
End: 2022-08-03

## 2022-08-03 VITALS
BODY MASS INDEX: 32.95 KG/M2 | HEART RATE: 76 BPM | WEIGHT: 235.38 LBS | DIASTOLIC BLOOD PRESSURE: 70 MMHG | RESPIRATION RATE: 14 BRPM | HEIGHT: 71 IN | SYSTOLIC BLOOD PRESSURE: 138 MMHG

## 2022-08-03 DIAGNOSIS — R73.9 HYPERGLYCEMIA: ICD-10-CM

## 2022-08-03 DIAGNOSIS — E78.5 HYPERLIPIDEMIA, UNSPECIFIED HYPERLIPIDEMIA TYPE: ICD-10-CM

## 2022-08-03 DIAGNOSIS — I25.810 CORONARY ARTERY DISEASE INVOLVING AUTOLOGOUS VEIN CORONARY BYPASS GRAFT WITHOUT ANGINA PECTORIS: ICD-10-CM

## 2022-08-03 DIAGNOSIS — I10 PRIMARY HYPERTENSION: Primary | ICD-10-CM

## 2022-08-03 DIAGNOSIS — N13.5 URETERAL STRICTURE: ICD-10-CM

## 2022-08-03 DIAGNOSIS — I25.810 CORONARY ARTERY DISEASE INVOLVING CORONARY BYPASS GRAFT OF NATIVE HEART WITHOUT ANGINA PECTORIS: ICD-10-CM

## 2022-08-03 PROBLEM — N20.0 KIDNEY STONES: Status: ACTIVE | Noted: 2022-08-03

## 2022-08-03 PROCEDURE — 1123F ACP DISCUSS/DSCN MKR DOCD: CPT | Performed by: FAMILY MEDICINE

## 2022-08-03 PROCEDURE — 99213 OFFICE O/P EST LOW 20 MIN: CPT | Performed by: FAMILY MEDICINE

## 2022-08-03 SDOH — ECONOMIC STABILITY: FOOD INSECURITY: WITHIN THE PAST 12 MONTHS, YOU WORRIED THAT YOUR FOOD WOULD RUN OUT BEFORE YOU GOT MONEY TO BUY MORE.: NEVER TRUE

## 2022-08-03 SDOH — ECONOMIC STABILITY: FOOD INSECURITY: WITHIN THE PAST 12 MONTHS, THE FOOD YOU BOUGHT JUST DIDN'T LAST AND YOU DIDN'T HAVE MONEY TO GET MORE.: NEVER TRUE

## 2022-08-03 ASSESSMENT — PATIENT HEALTH QUESTIONNAIRE - PHQ9
SUM OF ALL RESPONSES TO PHQ QUESTIONS 1-9: 0
2. FEELING DOWN, DEPRESSED OR HOPELESS: 0
SUM OF ALL RESPONSES TO PHQ9 QUESTIONS 1 & 2: 0
SUM OF ALL RESPONSES TO PHQ QUESTIONS 1-9: 0
SUM OF ALL RESPONSES TO PHQ QUESTIONS 1-9: 0
1. LITTLE INTEREST OR PLEASURE IN DOING THINGS: 0
SUM OF ALL RESPONSES TO PHQ QUESTIONS 1-9: 0

## 2022-08-03 ASSESSMENT — ENCOUNTER SYMPTOMS
SORE THROAT: 0
TROUBLE SWALLOWING: 0
ABDOMINAL PAIN: 0
ORTHOPNEA: 0
BLOOD IN STOOL: 0
BACK PAIN: 1
COUGH: 0
CHEST TIGHTNESS: 0
NAUSEA: 0
CONSTIPATION: 0
SHORTNESS OF BREATH: 0
EYE PAIN: 0

## 2022-08-03 ASSESSMENT — SOCIAL DETERMINANTS OF HEALTH (SDOH): HOW HARD IS IT FOR YOU TO PAY FOR THE VERY BASICS LIKE FOOD, HOUSING, MEDICAL CARE, AND HEATING?: NOT HARD AT ALL

## 2022-08-04 ENCOUNTER — NURSE ONLY (OUTPATIENT)
Dept: FAMILY MEDICINE CLINIC | Age: 72
End: 2022-08-04

## 2022-08-04 DIAGNOSIS — R73.9 HYPERGLYCEMIA: Primary | ICD-10-CM

## 2022-08-04 LAB
CHP ED QC CHECK: ABNORMAL
GLUCOSE BLD-MCNC: 117 MG/DL
HBA1C MFR BLD: 5.8 %

## 2022-08-04 PROCEDURE — 83036 HEMOGLOBIN GLYCOSYLATED A1C: CPT | Performed by: EMERGENCY MEDICINE

## 2022-08-04 PROCEDURE — 82962 GLUCOSE BLOOD TEST: CPT | Performed by: EMERGENCY MEDICINE

## 2022-08-05 ENCOUNTER — HOSPITAL ENCOUNTER (OUTPATIENT)
Dept: CT IMAGING | Age: 72
Discharge: HOME | End: 2022-08-05
Payer: MEDICARE

## 2022-08-05 DIAGNOSIS — N20.1 LEFT URETERAL STONE: ICD-10-CM

## 2022-08-05 DIAGNOSIS — R33.9 URINARY RETENTION: ICD-10-CM

## 2022-08-05 PROCEDURE — 74176 CT ABD & PELVIS W/O CONTRAST: CPT

## 2022-08-07 NOTE — OP NOTE
[address] [place] [address]. [place]    DATE: 7/27/2022  Patient:  Javy Martins  MRN: [id number]  YOB: 1950    SURGEON: Silvana David MD.    ASSISTANT: none    PREOPERATIVE DIAGNOSIS: left ureteral stone      POSTOPERATIVE DIAGNOSIS:  left ureteral stone      PROCEDURE PERFORMED: cystoscopy, left ureteroscopy left holmium laser lithotripsy left stone basketing left stent placement    ANESTHESIA: General    COMPLICATIONS: none    OR BLOOD LOSS:  Minimal    FLUIDS: Cystalloids per Anesthesia    SPECIMENS:  * No specimens in log *  none    DRAINS: 6 x 26 dbl j stent    INDICATIONS FOR PROCEDURE:  The patient is a 67 y.o. male who presents today with Left ureteral stone [N20.1] here for CYSTO, LEFT URETEROSCOPY, LASER LITHOTRIPSY, BASKET RETRIEVAL OF STONE FRAGMENTS, STENT. After risks, benefits and alternatives of the procedure were discussed with the patient, the patient elected to proceed. DETAILS OF PROCEDURE:  After informed consent was obtained in the preoperative area, the patient was taken back to the operating room and transferred to the operating table in supine position. Anesthesia was induced and antibiotics were given. The patient was placed in modified dorsal lithotomy position and sterilely prepped and draped in a standard fashion. A timeout occurred. Two patient identifiers were used. We entered the urethra with a 22 [employer] scope. We focused our attention on the left ureteral orifice. The ureteral orifice was visualized, and using a dual lumen catheter two wires were advanced into the kidney under fluoroscopic guidance. The rigid ureteroscope was assembled, placed next to the Glidewire, and advanced into the ureter carefully. A wire remained in place as a safety. The stone was located in the distal ureter. we were able to advance our scope up to the stone without difficulty.        We used a 200 micron laser to fragment all stones into sub 1-2 mm fragments for easy passage and clearance     Larger stone fragments were removed with a basket for stone analysis. We surveyed the ureter. There were no papillary lesions, stricture, ureteral trauma, or papillary lesions. Repeat ureteroscopy demonstrated further stone fragments in the kidney that were fragmented. One stone fragment passed further up the ureter but the ureter was too narrow to advance up to retreive it. A stent was then placed. the stent was advanced until it was in proper location. The Glidewire was then removed. A curl could be seen in the Left renal pelvis under using fluoroscopic vision, and in the bladder under direct visualization. A string was left on the stent. The patients bladder was drained. All instrumentation was removed. The patient was then awakened and discharged back to the PACU in good and stable condition. Will get ct after stent removal before pt vacation to assure the patient is stone free.   Of note, pt has hx of right ureteral reimplant

## 2022-08-08 ENCOUNTER — OFFICE VISIT (OUTPATIENT)
Dept: UROLOGY | Age: 72
End: 2022-08-08
Payer: MEDICARE

## 2022-08-08 VITALS
DIASTOLIC BLOOD PRESSURE: 72 MMHG | HEIGHT: 71 IN | BODY MASS INDEX: 32.62 KG/M2 | WEIGHT: 233 LBS | SYSTOLIC BLOOD PRESSURE: 118 MMHG

## 2022-08-08 DIAGNOSIS — N20.1 LEFT URETERAL STONE: Primary | ICD-10-CM

## 2022-08-08 DIAGNOSIS — N13.8 BPH WITH OBSTRUCTION/LOWER URINARY TRACT SYMPTOMS: ICD-10-CM

## 2022-08-08 DIAGNOSIS — R97.20 ELEVATED PSA: ICD-10-CM

## 2022-08-08 DIAGNOSIS — N40.1 BPH WITH OBSTRUCTION/LOWER URINARY TRACT SYMPTOMS: ICD-10-CM

## 2022-08-08 PROCEDURE — 99214 OFFICE O/P EST MOD 30 MIN: CPT | Performed by: UROLOGY

## 2022-08-08 PROCEDURE — 1123F ACP DISCUSS/DSCN MKR DOCD: CPT | Performed by: UROLOGY

## 2022-08-08 RX ORDER — TAMSULOSIN HYDROCHLORIDE 0.4 MG/1
0.8 CAPSULE ORAL DAILY
Qty: 60 CAPSULE | Refills: 6 | Status: SHIPPED | OUTPATIENT
Start: 2022-08-08

## 2022-08-08 NOTE — PROGRESS NOTES
MD MD [facility] Consultation / New Patient Visit    Patient:  Amee Duffy  YOB: 1950  Date: 8/8/2022  Consult requested from Gerline Schirmer, MD     HISTORY OF PRESENT ILLNESS:   The patient is a 67 y.o. male who presents today for follow-up for the following problem(s): Right ureteral stricture  Overall the problem(s) : are worsening. Associated Symptoms: No dysuria, gross hematuria. Pain Severity:      Today visit:   8/8/22   Presents with history of right hydronephrosis, s/p right robo ureteral reimplant (4/4/22) - secondary to stricture (no cancer on biopsy). Recently had a left Ureteroscopy for obstructing stone Simonne Baumgarten) - stent removed. No other non obstructing stones on CT scan. - Right kidney draining well, no hydro. Summary of old records:   (Patient's old records, notes and chart reviewed and summarized above.)    Last several PSA's:  Lab Results   Component Value Date    PSA 5.11 (H) 12/17/2021    PSA 3.89 (H) 07/10/2019    PSA 4.18 (H) 05/17/2018       Last total testosterone:  No results found for: TESTOSTERONE    Urinalysis today:  No results found for this visit on 08/08/22. Last BUN and creatinine:  Lab Results   Component Value Date    BUN 16 07/26/2022     Lab Results   Component Value Date    CREATININE 0.9 07/26/2022       Imaging Reviewed during this Office Visit:   (results were independently reviewed by physician and radiology report verified)    PAST MEDICAL, FAMILY AND SOCIAL HISTORY:  Past Medical History:   Diagnosis Date    Arthritis     ASHD (arteriosclerotic heart disease)     CAD (coronary artery disease)     Status post bypass    Colon polyps 2009    colonoscopy  tubular  adenoma   rinesmith    Decreased sense of smell 05/22/2015    Epistaxis 05/22/2015    History of blood transfusion 2005    ? ???? with by pass? ??     Hyperlipidemia     Hypertension     Kidney stones 07/2022    dr Mireya Blank    Mild carotid artery disease ([facility] [address] 2013    Nasal obstruction 05/22/2015    Nasal septal perforation 05/22/2015     Past Surgical History:   Procedure Laterality Date    BLADDER SURGERY Right 12/20/2021    CYSTO, URETEROSCOPY, RIGHT RETROGRADE PYELOGRAM, URETERAL DILATION, WITH URETERAL BX RIGHT URETERAL STENT PLACEMENT performed by Chencho Blanco MD at [address] [place] [address] Right 1/31/2022    CYSTOSCOPY RIGHT RETROGRADE PYELOGRAM, RIGHT STENT EXCHANGE performed by Chencho Blanco MD at [address] [place] [address] Right 4/4/2022    CYSTOSCOPY, RIGHT STENT EXCHANGE, RIGHT RETROGRADE PYELOGRAM, ROBOTIC RIGHT URETERAL REIMPLANT performed by Chencho Blanco MD at [address] [place] [address] Right 6/20/2022    Cystoscopy Right Retrograde Pyelogram Right Ureteral Stent Removal performed by Chencho Blanco MD at [address]  2005    6 by pass    COLONOSCOPY  12/2019    Dr Christy Aguilera  colon polyps 8//2014 9-2019    COLONOSCOPY Left 09/13/2019    COLONOSCOPY POLYPECTOMY SNARE/COLD BIOPSY performed by Skyler Rodriguez MD at [address] [place] [address]  11/2005    EXCISION OF AURAL MASS      NASAL POLYP SURGERY  06/05/2015    BIOPSY OF NASAL [address] [place] [address]  2018    on back    TONSILLECTOMY      as a child    URETER SURGERY Left 7/27/2022    CYSTO, LEFT URETEROSCOPY, LASER LITHOTRIPSY, BASKET RETRIEVAL OF STONE FRAGMENTS, STENT performed by Daryl Ceron MD at [facility] History   Problem Relation Age of Onset    Heart Disease Mother     Cancer Father         throat    Diabetes Sister     Asthma Brother      Outpatient Medications Marked as Taking for the 8/8/22 encounter (Office Visit) with Chencho Blanco MD   Medication Sig Dispense Refill    ketorolac (TORADOL) 10 MG tablet Take 1 tablet by mouth every 6 hours as needed for Pain 20 tablet 0    tamsulosin (FLOMAX) 0.4 MG capsule take 1 capsule by mouth once daily 30 capsule 2    hydroCHLOROthiazide (HYDRODIURIL) 25 MG tablet Take 0.5 tablets by mouth daily 90 tablet 1    losartan (COZAAR) 100 MG tablet TAKE 1 TABLET DAILY 90 tablet 2    atorvastatin (LIPITOR) 40 MG tablet TAKE 1 TABLET DAILY 90 tablet 3    metoprolol tartrate (LOPRESSOR) 25 MG tablet TAKE 1 TABLET TWICE A  tablet 3    vitamin D (ERGOCALCIFEROL) 1.25 MG (42252 UT) CAPS capsule TAKE 1 CAPSULE ONCE A WEEK 12 capsule 3    aspirin 81 MG EC tablet Take 81 mg by mouth 2 times daily       acetaminophen (TYLENOL) 650 MG extended release tablet Take 650 mg by mouth every 8 hours as needed for Pain      Multiple Vitamin (MULTI-VITAMIN) TABS Take by mouth daily          Patient has no known allergies. Social History     Tobacco Use   Smoking Status Former    Packs/day: 1.50    Years: 30.00    Pack years: 45.00    Types: Cigarettes    Quit date: 6/3/2002    Years since quittin.1   Smokeless Tobacco Never       Social History     Substance and Sexual Activity   Alcohol Use Yes    Comment: seldom       REVIEW OF SYSTEMS:  Constitutional: negative  Eyes: negative  Respiratory: negative  Cardiovascular: negative  Gastrointestinal: negative  Musculoskeletal: negative  Genitourinary: negative  Skin: negative   Neurological: negative  Hematological/Lymphatic: negative  Psychological: negative    Physical Exam:    This a 67 y.o. male   Vitals:    22 1018   BP: 118/72     Constitutional: Patient in no acute distress   Neuro: alert and oriented to person place and time. Psych: Mood and affect normal.  Head: atraumatic normocephalic  Eyes: EOMi  HEENT: neck supple, trachea midline  Lungs: Respiratory effort normal  Cardiovascular:  Normal peripheral pulses  Abdomen: Soft, non-tender, non-distended, No CVA  Bladder: non-tender and not distended. FROMx4, no cyanosis clubbing edema  Skin: warm and dry    Assessment and Plan      1. Left ureteral stone    2.  BPH with obstruction/lower urinary tract symptoms    3. Elevated PSA           Plan:      No follow-ups on file. Right ureteral stricture - Robotic Right ureteral reimplant (4/4/22).   - Hydro resolved on CT scan  Kidney stone - resolved after URS hlll    BPH with urinary retention - continue flomax for now, not controlled - will increase flomax to 0.8 mg daily  - Follow up 3 months with Cysto for BPH

## 2022-09-12 ENCOUNTER — PROCEDURE VISIT (OUTPATIENT)
Dept: UROLOGY | Age: 72
End: 2022-09-12
Payer: MEDICARE

## 2022-09-12 VITALS
WEIGHT: 230 LBS | SYSTOLIC BLOOD PRESSURE: 134 MMHG | HEIGHT: 71 IN | DIASTOLIC BLOOD PRESSURE: 82 MMHG | BODY MASS INDEX: 32.2 KG/M2

## 2022-09-12 DIAGNOSIS — N13.8 BPH WITH OBSTRUCTION/LOWER URINARY TRACT SYMPTOMS: Primary | ICD-10-CM

## 2022-09-12 DIAGNOSIS — N40.1 BPH WITH OBSTRUCTION/LOWER URINARY TRACT SYMPTOMS: Primary | ICD-10-CM

## 2022-09-12 PROCEDURE — 1123F ACP DISCUSS/DSCN MKR DOCD: CPT | Performed by: UROLOGY

## 2022-09-12 PROCEDURE — 52000 CYSTOURETHROSCOPY: CPT | Performed by: UROLOGY

## 2022-09-12 PROCEDURE — 99214 OFFICE O/P EST MOD 30 MIN: CPT | Performed by: UROLOGY

## 2022-09-12 NOTE — PROGRESS NOTES
Dr. Franck Diana MD MD  [facility] Urology Clinic Consultation / New Patient Visit    Patient:  Shayne Torres  YOB: 1950  Date: 9/12/2022  Consult requested from Uzma Quesada MD     HISTORY OF PRESENT ILLNESS:   The patient is a 67 y.o. male who presents today for follow-up for the following problem(s): Right ureteral stricture  Overall the problem(s) : are worsening. Associated Symptoms: No dysuria, gross hematuria. Pain Severity:      Today visit:   9/12/22   Cystoscopy Operative Note  Surgeon: Franck Diana MD   Anesthesia: Urethral 2%  Indications: BPH with LUTs  Position: supine  Findings:   The patient was prepped and draped in the usual sterile fashion. The flexible cystoscope was advanced through the urethra and into the bladder. The bladder was thoroughly inspected and the following was noted:    Residual Urine: Minimal / Moderate / Significant  Urethra: No abnormalities of the urethra are noted. Prostate: Med/large gland (< 80 gm) Complete obstruction by lateral small median lobe of prostate. Bladder: No tumors or CIS noted. No bladder diverticulum. Moderate  trabeculation noted. Right Reimplanted ureter. Ureters: Clear efflux from both ureters. Orifices with normal configuration and location. The cystoscope was removed. The patient tolerated the procedure well. Plan: good candidate for urolift vs greenlight PVP      Summary of old records:   (Patient's old records, notes and chart reviewed and summarized above.)  Presents with history of right hydronephrosis, s/p right robo ureteral reimplant (4/4/22) - secondary to stricture (no cancer on biopsy). Recently had a left Ureteroscopy for obstructing stone Hector Olivares) - stent removed. No other non obstructing stones on CT scan. - Right kidney draining well, no hydro.        Last several PSA's:  Lab Results   Component Value Date    PSA 5.11 (H) 12/17/2021    PSA 3.89 (H) 07/10/2019    PSA 4.18 (H) 05/17/2018       Last total testosterone:  No results found for: TESTOSTERONE    Urinalysis today:  No results found for this visit on 09/12/22. Last BUN and creatinine:  Lab Results   Component Value Date    BUN 16 07/26/2022     Lab Results   Component Value Date    CREATININE 0.9 07/26/2022       Imaging Reviewed during this Office Visit:   (results were independently reviewed by physician and radiology report verified)    PAST MEDICAL, FAMILY AND SOCIAL HISTORY:  Past Medical History:   Diagnosis Date    Arthritis     ASHD (arteriosclerotic heart disease)     CAD (coronary artery disease)     Status post bypass    Colon polyps 2009    colonoscopy  tubular  adenoma   rinesmith    Decreased sense of smell 05/22/2015    Epistaxis 05/22/2015    History of blood transfusion 2005    ? ???? with by pass? ??     Hyperlipidemia     Hypertension     Kidney stones 07/2022    dr Anupama Johnson    Mild carotid artery disease [facility]) 2013    Nasal obstruction 05/22/2015    Nasal septal perforation 05/22/2015     Past Surgical History:   Procedure Laterality Date    BLADDER SURGERY Right 12/20/2021    CYSTO, URETEROSCOPY, RIGHT RETROGRADE PYELOGRAM, URETERAL DILATION, WITH URETERAL BX RIGHT URETERAL STENT PLACEMENT performed by Edu Vinson MD at [address] [place] [address] Right 1/31/2022    CYSTOSCOPY RIGHT RETROGRADE PYELOGRAM, RIGHT STENT EXCHANGE performed by Edu Vinson MD at [address] [place] [address] Right 4/4/2022    CYSTOSCOPY, RIGHT STENT EXCHANGE, RIGHT RETROGRADE PYELOGRAM, ROBOTIC RIGHT URETERAL REIMPLANT performed by Edu Vinson MD at [address] [place] [address] Right 6/20/2022    Cystoscopy Right Retrograde Pyelogram Right Ureteral Stent Removal performed by Edu Vinson MD at [address]  2005    6 by pass    COLONOSCOPY  12/2019    Dr Isacc Donis  colon polyps 8//2014 9-2019    COLONOSCOPY Left 09/13/2019    COLONOSCOPY POLYPECTOMY SNARE/COLD BIOPSY performed by [facility] Columba Warren MD at [address] [place]  2005    EXCISION OF AURAL MASS      NASAL POLYP SURGERY  2015    BIOPSY OF NASAL POLYP MIDDLE TURBINATE AND BIOPSY SEPTAL [address]    SKIN CANCER EXCISION  2018    on back    TONSILLECTOMY      as a child    URETER SURGERY Left 2022    CYSTO, LEFT URETEROSCOPY, LASER LITHOTRIPSY, BASKET RETRIEVAL OF STONE FRAGMENTS, STENT performed by Dillan Ureña MD at [facility] History   Problem Relation Age of Onset    Heart Disease Mother     Cancer Father         throat    Diabetes Sister     Asthma Brother      Outpatient Medications Marked as Taking for the 22 encounter (Procedure visit) with Marcus Parikh MD   Medication Sig Dispense Refill    tamsulosin (FLOMAX) 0.4 MG capsule Take 2 capsules by mouth in the morning. Take one capsule daily to facilitate passage of ureteral stone. 60 capsule 6    ketorolac (TORADOL) 10 MG tablet Take 1 tablet by mouth every 6 hours as needed for Pain 20 tablet 0    hydroCHLOROthiazide (HYDRODIURIL) 25 MG tablet Take 0.5 tablets by mouth daily 90 tablet 1    losartan (COZAAR) 100 MG tablet TAKE 1 TABLET DAILY 90 tablet 2    atorvastatin (LIPITOR) 40 MG tablet TAKE 1 TABLET DAILY 90 tablet 3    metoprolol tartrate (LOPRESSOR) 25 MG tablet TAKE 1 TABLET TWICE A  tablet 3    vitamin D (ERGOCALCIFEROL) 1.25 MG (79063 UT) CAPS capsule TAKE 1 CAPSULE ONCE A WEEK 12 capsule 3    aspirin 81 MG EC tablet Take 81 mg by mouth 2 times daily       acetaminophen (TYLENOL) 650 MG extended release tablet Take 650 mg by mouth every 8 hours as needed for Pain      Multiple Vitamin (MULTI-VITAMIN) TABS Take by mouth daily          Patient has no known allergies.   Social History     Tobacco Use   Smoking Status Former    Packs/day: 1.50    Years: 30.00    Pack years: 45.00    Types: Cigarettes    Quit date: 6/3/2002    Years since quittin.2   Smokeless Tobacco Never       Social History Substance and Sexual Activity   Alcohol Use Yes    Comment: seldom       REVIEW OF SYSTEMS:  Constitutional: negative  Eyes: negative  Respiratory: negative  Cardiovascular: negative  Gastrointestinal: negative  Musculoskeletal: negative  Genitourinary: negative  Skin: negative   Neurological: negative  Hematological/Lymphatic: negative  Psychological: negative    Physical Exam:    This a 67 y.o. male   Vitals:    09/12/22 1114   BP: 134/82     Constitutional: Patient in no acute distress   Neuro: alert and oriented to person place and time. Psych: Mood and affect normal.  Head: atraumatic normocephalic  Eyes: EOMi  HEENT: neck supple, trachea midline  Lungs: Respiratory effort normal  Cardiovascular:  Normal peripheral pulses  Abdomen: Soft, non-tender, non-distended, No CVA  Bladder: non-tender and not distended. FROMx4, no cyanosis clubbing edema  Skin: warm and dry    Assessment and Plan      1. BPH with obstruction/lower urinary tract symptoms           Plan:      No follow-ups on file. Right ureteral stricture - Robotic Right ureteral reimplant (4/4/22). - Hydro resolved on CT scan  Kidney stone - resolved after URS hlll    BPH with urinary retention - continue flomax for now, not controlled - will increase flomax to 0.8 mg daily  - Cysto for BPH  - good candidate for Greenlight PVP vs Urolift   Risks benefits and alternative procedures are explained, informed consent is obtained, and the patient elects to proceed.

## 2022-09-29 ENCOUNTER — TELEPHONE (OUTPATIENT)
Dept: UROLOGY | Age: 72
End: 2022-09-29

## 2022-09-29 DIAGNOSIS — N40.1 BPH WITH OBSTRUCTION/LOWER URINARY TRACT SYMPTOMS: Primary | ICD-10-CM

## 2022-09-29 DIAGNOSIS — Z01.818 PRE-OP TESTING: ICD-10-CM

## 2022-09-29 DIAGNOSIS — N13.8 BPH WITH OBSTRUCTION/LOWER URINARY TRACT SYMPTOMS: Primary | ICD-10-CM

## 2022-09-29 NOTE — TELEPHONE ENCOUNTER
SURGERY [address]  [address] [place] [address] [address] [place], [address]      Phone *[phone] *[phone]   Surgical Scheduling Direct Line Phone *[phone] Fax *[phone]      Ewa Tami 1950 male    [address]  [address] [address]   Marital Status:          Home Phone: [phone]      Cell Phone:    Telephone Information:   Mobile [phone]          Surgeon: Dr. Bertram Juan Surgery Date: 10/31/22   Time: 10:00 AM    Procedure: Cystoscopy, Urolift    Diagnosis: BPH with LUTs     Important Medical History:  In [facility]    Special Inst/Equip: MARIELLE Gross with Urolift notified    CPT Codes:    72705  Latex Allergy: No     Cardiac Device:  No    Anesthesia:  MAC          Admission Type:  Same Day                        Admit Prior to Day of Surgery: No    Case Location:  Main OR            Preadmission Testing:  Phone Call          PAT Date and Time:______________________________________________________    PAT Confirmation #: ______________________________________________________    Post Op Visit: ___________________________________________________________    Need Preop Cardiac Clearance: Yes    Does Patient have Cardiologist/physician?      Dr Adam Dewitt Confirmation #: __________________________________________________    Heather Matt: ________________________   Date: __________________________     Insurance Company Name: [facility]

## 2022-09-29 NOTE — TELEPHONE ENCOUNTER
Patient is scheduled for surgery with Dr Nichol Humphrey on 10/31/22. Surgery consent on arrival. Patient to do pre op urine culture and fasting labs on 10/17/22. Patient will have an adult over the age of 25 with them at discharge and 24 hours after procedure. Surgery instructions gone over verbally and mailed to patient. Dr Shanika Lopez to clear. Clemente Medellin from [facility].

## 2022-09-29 NOTE — TELEPHONE ENCOUNTER
DO NOT TAKE  FISH OIL, MOBIC,COUMADIN, IBUPROFEN, MOTRIN-LIKE DRUGS AND ANY MULTIVITAMINS OR OVER THE COUNTER SUPPLEMENTS 14 DAYS PRIOR TO SURGERY. MUST HAVE AN ADULT OVER THE AGE OF 18 WITH YOU AT THE TIME OF THE DISCHARGE AND WITH YOU AT HOME AFTER THE PROCEDURE FOR 24 HOURS     **HOLD ASPIRIN 5 DAYS PRIOR TO SURGERY IF OK BY DR Gutierrez Massey 1950 Diagnosis:     Surgical Physician: Dr. Audrey Garcia have been scheduled for the procedure marked below:      Surgery: Cystoscopy, Urolift         Date: 10/31/22     Anesthesia:  MAC      Place of Service: [address] [address] Second Floor Same Day Surgery         Arrive to same day surgery by:  8:00 AM  (Surgery time is subject to change)      INSTRUCTIONS AS MARKED BELOW:    1.  DO NOT eat or drink anything after midnight before surgery. 2.  We prefer you shower or bathe with an antibacterial soap (Dial) the morning of surgery. 3  Please bring a current medication list, photo ID and insurance card(s) with you  4. Okay to take Tylenol  5. If you take Glucophage, Metformin or Janumet, hold 48-hours prior to surgery  6  Take blood pressure or heart medication as directed, if taken in the morning take with a small sip of water  7. The office will call you in 1-2 days after your procedure to schedule a follow up. DATE SENSITIVE TESTING-DO ON THE DATE LISTED *WALK IN *NO APPOINTMENT    DO PRE OP URINE CULTURE AND FASTING LABS ON 10/17/22. ORDERS INCLUDED.         Date: 9/29/2022

## 2022-09-29 NOTE — TELEPHONE ENCOUNTER
Patient is scheduled for Cystoscopy, Urolift under MAC with Dr Candido Duval on 10/31/22. We are asking for clearance. Thank you.

## 2022-10-17 ENCOUNTER — HOSPITAL ENCOUNTER (OUTPATIENT)
Age: 72
Discharge: HOME | End: 2022-10-17
Payer: MEDICARE

## 2022-10-17 DIAGNOSIS — Z01.818 PRE-OP TESTING: ICD-10-CM

## 2022-10-17 DIAGNOSIS — N40.1 BPH WITH OBSTRUCTION/LOWER URINARY TRACT SYMPTOMS: ICD-10-CM

## 2022-10-17 DIAGNOSIS — N13.8 BPH WITH OBSTRUCTION/LOWER URINARY TRACT SYMPTOMS: ICD-10-CM

## 2022-10-17 LAB
ANION GAP SERPL CALCULATED.3IONS-SCNC: 13 MEQ/L (ref 8–16)
BASOPHILS # BLD: 0.4 %
BASOPHILS ABSOLUTE: 0 THOU/MM3 (ref 0–0.1)
BUN BLDV-MCNC: 22 MG/DL (ref 7–22)
CALCIUM SERPL-MCNC: 9.3 MG/DL (ref 8.5–10.5)
CHLORIDE BLD-SCNC: 104 MEQ/L (ref 98–111)
CO2: 22 MEQ/L (ref 23–33)
CREAT SERPL-MCNC: 1.1 MG/DL (ref 0.4–1.2)
EOSINOPHIL # BLD: 2.9 %
EOSINOPHILS ABSOLUTE: 0.2 THOU/MM3 (ref 0–0.4)
ERYTHROCYTE [DISTWIDTH] IN BLOOD BY AUTOMATED COUNT: 13.2 % (ref 11.5–14.5)
ERYTHROCYTE [DISTWIDTH] IN BLOOD BY AUTOMATED COUNT: 46.5 FL (ref 35–45)
GLUCOSE BLD-MCNC: 97 MG/DL (ref 70–108)
HCT VFR BLD CALC: 40.6 % (ref 42–52)
HEMOGLOBIN: 13.7 GM/DL (ref 14–18)
IMMATURE GRANS (ABS): 0.01 THOU/MM3 (ref 0–0.07)
IMMATURE GRANULOCYTES: 0.1 %
LYMPHOCYTES # BLD: 32.4 %
LYMPHOCYTES ABSOLUTE: 2.6 THOU/MM3 (ref 1–4.8)
MCH RBC QN AUTO: 32.3 PG (ref 26–33)
MCHC RBC AUTO-ENTMCNC: 33.7 GM/DL (ref 32.2–35.5)
MCV RBC AUTO: 95.8 FL (ref 80–94)
MONOCYTES # BLD: 9.8 %
MONOCYTES ABSOLUTE: 0.8 THOU/MM3 (ref 0.4–1.3)
NUCLEATED RED BLOOD CELLS: 0 /100 WBC
PLATELET # BLD: 177 THOU/MM3 (ref 130–400)
PMV BLD AUTO: 11.1 FL (ref 9.4–12.4)
POTASSIUM SERPL-SCNC: 3.8 MEQ/L (ref 3.5–5.2)
RBC # BLD: 4.24 MILL/MM3 (ref 4.7–6.1)
SEG NEUTROPHILS: 54.4 %
SEGMENTED NEUTROPHILS ABSOLUTE COUNT: 4.3 THOU/MM3 (ref 1.8–7.7)
SODIUM BLD-SCNC: 139 MEQ/L (ref 135–145)
WBC # BLD: 7.9 THOU/MM3 (ref 4.8–10.8)

## 2022-10-17 PROCEDURE — 80048 BASIC METABOLIC PNL TOTAL CA: CPT

## 2022-10-17 PROCEDURE — 87086 URINE CULTURE/COLONY COUNT: CPT

## 2022-10-17 PROCEDURE — 85025 COMPLETE CBC W/AUTO DIFF WBC: CPT

## 2022-10-17 PROCEDURE — 36415 COLL VENOUS BLD VENIPUNCTURE: CPT

## 2022-10-18 ENCOUNTER — PREP FOR PROCEDURE (OUTPATIENT)
Dept: UROLOGY | Age: 72
End: 2022-10-18

## 2022-10-19 LAB — URINE CULTURE, ROUTINE: NORMAL

## 2022-10-21 RX ORDER — SODIUM CHLORIDE 9 MG/ML
INJECTION, SOLUTION INTRAVENOUS CONTINUOUS
Status: CANCELLED | OUTPATIENT
Start: 2022-10-21

## 2022-10-24 ENCOUNTER — TELEPHONE (OUTPATIENT)
Dept: UROLOGY | Age: 72
End: 2022-10-24

## 2022-10-25 NOTE — PROGRESS NOTES
Follow all instructions given by your physician    NPO after midnight   Sips of water am of surgery with allowed medications  Bring insurance info and 's license  Wear comfortable clean, loose fitting clothing  No jewelry or contact lenses to be worn day of surgery  No glue on dentures morning of surgery;you will be asked to remove them for surgery. Case for glasses. Shower night before and morning of surgery with a liquid antibacterial soap, dry with fresh clean towel; no lotions, creams or powder. Clean sheets and pillow case on bed night before surgery  Bring medications in original bottles  Bring CPAP/BIPAP machine if you have one ( you may be charged if one is needed in recovery room )   needed at discharge and someone over 18 to stay with you for 24 hours overnight (surgery may be cancelled if you don't have this)  Report to [facility] on 2nd floor  If you would become ill prior to surgery, please call the surgeon  May have a visitor with you, we request that you limit to 2 visitors in pre-op area  Please bring and wear mask  Call [phone] for any questions  Covid questionnaire Complete; Patient negative for symptoms or exposure. See documentation.

## 2022-10-25 NOTE — PROGRESS NOTES
PAT call attempted, patient unavailable, left message to please call us back at your earliest convenience; [phone]

## 2022-10-27 ENCOUNTER — OFFICE VISIT (OUTPATIENT)
Dept: CARDIOLOGY CLINIC | Age: 72
End: 2022-10-27
Payer: MEDICARE

## 2022-10-27 VITALS
HEIGHT: 71 IN | SYSTOLIC BLOOD PRESSURE: 132 MMHG | HEART RATE: 60 BPM | BODY MASS INDEX: 33.43 KG/M2 | WEIGHT: 238.8 LBS | DIASTOLIC BLOOD PRESSURE: 86 MMHG

## 2022-10-27 DIAGNOSIS — I10 PRIMARY HYPERTENSION: ICD-10-CM

## 2022-10-27 DIAGNOSIS — I25.810 CORONARY ARTERY DISEASE INVOLVING CORONARY BYPASS GRAFT OF NATIVE HEART WITHOUT ANGINA PECTORIS: Primary | ICD-10-CM

## 2022-10-27 DIAGNOSIS — E78.01 FAMILIAL HYPERCHOLESTEROLEMIA: ICD-10-CM

## 2022-10-27 LAB — GFR SERPL CREATININE-BSD FRML MDRD: > 60 ML/MIN/1.73M2

## 2022-10-27 PROCEDURE — 1123F ACP DISCUSS/DSCN MKR DOCD: CPT | Performed by: NUCLEAR MEDICINE

## 2022-10-27 PROCEDURE — 3078F DIAST BP <80 MM HG: CPT | Performed by: NUCLEAR MEDICINE

## 2022-10-27 PROCEDURE — 3074F SYST BP LT 130 MM HG: CPT | Performed by: NUCLEAR MEDICINE

## 2022-10-27 PROCEDURE — 99213 OFFICE O/P EST LOW 20 MIN: CPT | Performed by: NUCLEAR MEDICINE

## 2022-10-27 NOTE — PROGRESS NOTES
[address] [facility] [address]  [facility] [place] [address]  Dept: [phone]  Dept Fax: [phone]  Loc: [phone]    Visit Date: 10/27/2022    Bianca Ortiz is a 67 y.o. male who presents todayfor:  Chief Complaint   Patient presents with    6 Month Follow-Up    Hypertension    Coronary Artery Disease    Hyperlipidemia   Known CABg 2005  Going for prostate surgery   Stress test and echo were okay   No chest pian   No changes in breathing  Bp is stable   No dizziness  No syncope        HPI:  HPI  Past Medical History:   Diagnosis Date    Arthritis     ASHD (arteriosclerotic heart disease)     CAD (coronary artery disease)     Status post bypass    Colon polyps 2009    colonoscopy  tubular  adenoma   rinesmith    Decreased sense of smell 05/22/2015    Epistaxis 05/22/2015    History of blood transfusion 2005    ? ???? with by pass? ??     Hyperlipidemia     Hypertension     Kidney stones 07/2022    dr Ramin Jimenez    Mild carotid artery disease [facility]) 2013    Nasal obstruction 05/22/2015    Nasal septal perforation 05/22/2015      Past Surgical History:   Procedure Laterality Date    BLADDER SURGERY Right 12/20/2021    CYSTO, URETEROSCOPY, RIGHT RETROGRADE PYELOGRAM, URETERAL DILATION, WITH URETERAL BX RIGHT URETERAL STENT PLACEMENT performed by Miriam Benoit MD at [address] [place] [address] Right 1/31/2022    CYSTOSCOPY RIGHT RETROGRADE PYELOGRAM, RIGHT STENT EXCHANGE performed by Miriam Benoit MD at [address] [place] [address] Right 4/4/2022    CYSTOSCOPY, RIGHT STENT EXCHANGE, RIGHT RETROGRADE PYELOGRAM, ROBOTIC RIGHT URETERAL REIMPLANT performed by Miriam Benoit MD at [address] [place] [address] Right 6/20/2022    Cystoscopy Right Retrograde Pyelogram Right Ureteral Stent Removal performed by Miriam Benoit MD at [address]  2005    6 by pass    COLONOSCOPY  12/2019    Dr Cristian Motley  colon polyps 8//2014     COLONOSCOPY Left 2019    COLONOSCOPY POLYPECTOMY SNARE/COLD BIOPSY performed by Aleksandra Echeverria MD at [address] [facility]  2005    EXCISION OF AURAL MASS      NASAL POLYP SURGERY  2015    BIOPSY OF NASAL POLYP MIDDLE TURBINATE AND BIOPSY SEPTAL [address]    SKIN CANCER EXCISION  2018    on back    TONSILLECTOMY      as a child    URETER SURGERY Left 2022    CYSTO, LEFT URETEROSCOPY, LASER LITHOTRIPSY, BASKET RETRIEVAL OF STONE FRAGMENTS, STENT performed by Anthony Jj MD at [address] History   Problem Relation Age of Onset    Heart Disease Mother     Cancer Father         throat    Diabetes Sister     Asthma Brother      Social History     Tobacco Use    Smoking status: Former     Packs/day: 1.50     Years: 30.00     Pack years: 45.00     Types: Cigarettes     Quit date: 6/3/2002     Years since quittin.4    Smokeless tobacco: Never   Substance Use Topics    Alcohol use: Yes     Comment: seldom      Current Outpatient Medications   Medication Sig Dispense Refill    tamsulosin (FLOMAX) 0.4 MG capsule Take 2 capsules by mouth in the morning. Take one capsule daily to facilitate passage of ureteral stone. 60 capsule 6    hydroCHLOROthiazide (HYDRODIURIL) 25 MG tablet Take 0.5 tablets by mouth daily 90 tablet 1    losartan (COZAAR) 100 MG tablet TAKE 1 TABLET DAILY 90 tablet 2    atorvastatin (LIPITOR) 40 MG tablet TAKE 1 TABLET DAILY 90 tablet 3    metoprolol tartrate (LOPRESSOR) 25 MG tablet TAKE 1 TABLET TWICE A  tablet 3    vitamin D (ERGOCALCIFEROL) 1.25 MG (01037 UT) CAPS capsule TAKE 1 CAPSULE ONCE A WEEK 12 capsule 3    aspirin 81 MG EC tablet Take 81 mg by mouth 2 times daily       acetaminophen (TYLENOL) 650 MG extended release tablet Take 650 mg by mouth every 8 hours as needed for Pain      Multiple Vitamin (MULTI-VITAMIN) TABS Take by mouth daily        No current facility-administered medications for this visit.      No Known Allergies  Health Maintenance   Topic Date Due    DTaP/Tdap/Td vaccine (1 - Tdap) Never done    Shingles vaccine (2 of 3) 08/14/2009    Pneumococcal 65+ years Vaccine (3) 08/27/2014    Annual Wellness Visit (AWV)  10/19/2022    Lipids  12/17/2022    Prostate Specific Antigen (PSA) Screening or Monitoring  12/17/2022    Depression Screen  08/03/2023    A1C test (Diabetic or Prediabetic)  08/04/2023    Colorectal Cancer Screen  09/13/2029    Flu vaccine  Completed    COVID-19 Vaccine  Completed    AAA screen  Completed    Hepatitis C screen  Completed    Hepatitis A vaccine  Aged Out    Hib vaccine  Aged Out    Meningococcal (ACWY) vaccine  Aged Out       Subjective:  Review of Systems  General:   No fever, no chills, No fatigue or weight loss  Pulmonary:    No dyspnea, no wheezing  Cardiac:    Denies recent chest pain,   GI:     No nausea or vomiting, no abdominal pain  Neuro:    No dizziness or light headedness,   Musculoskeletal:  No recent active issues  Extremities:   No edema, no obvious claudication     Objective:  Physical Exam  /86   Pulse 60   Ht 5' 11\" (1.803 m)   Wt 238 lb 12.8 oz (108.3 kg)   BMI 33.31 kg/m²   General:   Well developed, well nourished  Lungs:   Clear to auscultation  Heart:    Normal S1 S2, Slight murmur. no rubs, no gallops  Abdomen:   Soft, non tender, no organomegalies, positive bowel sounds  Extremities:   No edema, no cyanosis, good peripheral pulses  Neurological:   Awake, alert, oriented. No obvious focal deficits  Musculoskelatal:  No obvious deformities    Assessment:      Diagnosis Orders   1. Coronary artery disease involving coronary bypass graft of native heart without angina pectoris        2. Primary hypertension        3. Familial hypercholesterolemia        As above  Cardiac fair for now     Plan:  No follow-ups on file. As above  Continue risk factor modification and medical management  Thank you for allowing me to participate in the care of your patient. Please don't hesitate to contact me regarding any further issues related to the patient care    Orders Placed:  No orders of the defined types were placed in this encounter. Medications Prescribed:  No orders of the defined types were placed in this encounter. Discussed use, benefit, and side effects of prescribed medications. All patient questions answered. Pt voicedunderstanding. Instructed to continue current medications, diet and exercise. Continue risk factor modification and medical management. Patient agreed with treatment plan. Follow up as directed.     Electronically signedby Tej Ang MD on 10/27/2022 at 3:20 PM

## 2022-10-31 ENCOUNTER — ANESTHESIA EVENT (OUTPATIENT)
Dept: OPERATING ROOM | Age: 72
End: 2022-10-31
Payer: MEDICARE

## 2022-10-31 ENCOUNTER — ANESTHESIA (OUTPATIENT)
Dept: OPERATING ROOM | Age: 72
End: 2022-10-31
Payer: MEDICARE

## 2022-10-31 ENCOUNTER — HOSPITAL ENCOUNTER (OUTPATIENT)
Age: 72
Setting detail: OUTPATIENT SURGERY
Discharge: HOME | End: 2022-10-31
Attending: UROLOGY | Admitting: UROLOGY
Payer: MEDICARE

## 2022-10-31 VITALS
TEMPERATURE: 97.1 F | HEIGHT: 71 IN | BODY MASS INDEX: 32.69 KG/M2 | WEIGHT: 233.5 LBS | HEART RATE: 57 BPM | DIASTOLIC BLOOD PRESSURE: 65 MMHG | SYSTOLIC BLOOD PRESSURE: 141 MMHG | OXYGEN SATURATION: 98 % | RESPIRATION RATE: 16 BRPM

## 2022-10-31 PROCEDURE — 3700000001 HC ADD 15 MINUTES (ANESTHESIA): Performed by: UROLOGY

## 2022-10-31 PROCEDURE — C1889 IMPLANT/INSERT DEVICE, NOC: HCPCS | Performed by: UROLOGY

## 2022-10-31 PROCEDURE — 7100000011 HC PHASE II RECOVERY - ADDTL 15 MIN: Performed by: UROLOGY

## 2022-10-31 PROCEDURE — 3600000002 HC SURGERY LEVEL 2 BASE: Performed by: UROLOGY

## 2022-10-31 PROCEDURE — 3600000012 HC SURGERY LEVEL 2 ADDTL 15MIN: Performed by: UROLOGY

## 2022-10-31 PROCEDURE — 2580000003 HC RX 258: Performed by: UROLOGY

## 2022-10-31 PROCEDURE — 6360000002 HC RX W HCPCS

## 2022-10-31 PROCEDURE — 7100000010 HC PHASE II RECOVERY - FIRST 15 MIN: Performed by: UROLOGY

## 2022-10-31 PROCEDURE — 6360000002 HC RX W HCPCS: Performed by: UROLOGY

## 2022-10-31 PROCEDURE — 2709999900 HC NON-CHARGEABLE SUPPLY: Performed by: UROLOGY

## 2022-10-31 PROCEDURE — 2500000003 HC RX 250 WO HCPCS

## 2022-10-31 PROCEDURE — 3700000000 HC ANESTHESIA ATTENDED CARE: Performed by: UROLOGY

## 2022-10-31 DEVICE — SYSTEM UROLIFT2 W/ IMPL DEL DEV FOR TREAT OF URIN OUTFLO: Type: IMPLANTABLE DEVICE | Status: FUNCTIONAL

## 2022-10-31 DEVICE — DEVICE IMPLANT UROLIFT2 FOR TREAT OF URIN OUTFLO: Type: IMPLANTABLE DEVICE | Status: FUNCTIONAL

## 2022-10-31 RX ORDER — PROPOFOL 10 MG/ML
INJECTION, EMULSION INTRAVENOUS PRN
Status: DISCONTINUED | OUTPATIENT
Start: 2022-10-31 | End: 2022-10-31 | Stop reason: SDUPTHER

## 2022-10-31 RX ORDER — PHENAZOPYRIDINE HYDROCHLORIDE 100 MG/1
100 TABLET, FILM COATED ORAL 3 TIMES DAILY PRN
Qty: 21 TABLET | Refills: 0 | Status: SHIPPED | OUTPATIENT
Start: 2022-10-31 | End: 2022-11-07

## 2022-10-31 RX ORDER — ONDANSETRON 2 MG/ML
INJECTION INTRAMUSCULAR; INTRAVENOUS PRN
Status: DISCONTINUED | OUTPATIENT
Start: 2022-10-31 | End: 2022-10-31 | Stop reason: SDUPTHER

## 2022-10-31 RX ORDER — DOXYCYCLINE HYCLATE 100 MG
100 TABLET ORAL 2 TIMES DAILY
Qty: 10 TABLET | Refills: 0 | Status: SHIPPED | OUTPATIENT
Start: 2022-10-31 | End: 2022-11-05

## 2022-10-31 RX ORDER — FENTANYL CITRATE 50 UG/ML
INJECTION, SOLUTION INTRAMUSCULAR; INTRAVENOUS PRN
Status: DISCONTINUED | OUTPATIENT
Start: 2022-10-31 | End: 2022-10-31 | Stop reason: SDUPTHER

## 2022-10-31 RX ORDER — LIDOCAINE HYDROCHLORIDE 20 MG/ML
INJECTION, SOLUTION EPIDURAL; INFILTRATION; INTRACAUDAL; PERINEURAL PRN
Status: DISCONTINUED | OUTPATIENT
Start: 2022-10-31 | End: 2022-10-31 | Stop reason: SDUPTHER

## 2022-10-31 RX ORDER — SODIUM CHLORIDE 9 MG/ML
INJECTION, SOLUTION INTRAVENOUS CONTINUOUS
Status: DISCONTINUED | OUTPATIENT
Start: 2022-10-31 | End: 2022-10-31 | Stop reason: HOSPADM

## 2022-10-31 RX ADMIN — FENTANYL CITRATE 50 MCG: 50 INJECTION, SOLUTION INTRAMUSCULAR; INTRAVENOUS at 12:00

## 2022-10-31 RX ADMIN — ONDANSETRON 4 MG: 2 INJECTION INTRAMUSCULAR; INTRAVENOUS at 12:17

## 2022-10-31 RX ADMIN — FENTANYL CITRATE 25 MCG: 50 INJECTION, SOLUTION INTRAMUSCULAR; INTRAVENOUS at 12:05

## 2022-10-31 RX ADMIN — CEFAZOLIN 2000 MG: 10 INJECTION, POWDER, FOR SOLUTION INTRAVENOUS at 12:00

## 2022-10-31 RX ADMIN — SODIUM CHLORIDE: 9 INJECTION, SOLUTION INTRAVENOUS at 11:17

## 2022-10-31 RX ADMIN — PROPOFOL 220 MG: 10 INJECTION, EMULSION INTRAVENOUS at 12:00

## 2022-10-31 RX ADMIN — LIDOCAINE HYDROCHLORIDE 100 MG: 20 INJECTION, SOLUTION EPIDURAL; INFILTRATION; INTRACAUDAL; PERINEURAL at 12:00

## 2022-10-31 RX ADMIN — FENTANYL CITRATE 25 MCG: 50 INJECTION, SOLUTION INTRAMUSCULAR; INTRAVENOUS at 12:17

## 2022-10-31 ASSESSMENT — PAIN SCALES - GENERAL
PAINLEVEL_OUTOF10: 0

## 2022-10-31 NOTE — OP NOTE
FACILITY:  [address] [place] [address] Eileen Turner  [birth year]  [id number]    DATE: 10/31/22   SURGEON: Dr. Meena Lim MD , M.D.  [place]: none  PREOPERATIVE DIAGNOSIS:  BPH with obstruction   POSTOPERATIVE DIAGNOSIS: same  OPERATION:  1. Cystoscopy Urolift  ANESTHESIA:  MAC.   COMPLICATIONS:  None. ESTIMATED BLOOD LOSS:  Minimal.  FLUIDS:  Crystalloid. DRAINS:  none  Implants: 8 x urolift clips. SPECIMENS:  None. INDICATIONS FOR THE PROCEDURE:  Deven Bajwa is a 67 y.o. male  with a history of BPH with obstruction. After treatment options were discussed including risks, benefits, alternatives, goals and possible complications,  the patient elected to proceed with today's procedure. NARRATIVE SUMMARY OF THE PROCEDURE:  The patient was moved back to the OR and placed in lithotomy position. He was induced under MAC anesthesia after a time out was taken per protocol with everyone in agreement. The patient had a urolift scope passed into the urethra and into the bladder with ease. The prostate was evaluated and noted the bladder neck and veru montanum, which were our landmarks through out the case. We started at the bladder neck and moved 1.5 cm distal to ensure proper placement of the proximal clips bilaterally. We then placed clips in the distal gland, ensuring not to travel distal to the veru. We ensured the anterior channel was widely patent and saw the bulk of the prostate obstruction be resolved. We placed a total of:  8 urolift clips in proper position. There was no capsular pull through. There was no clip migration. We visualized the bladder neck and did not note any rogue clips in the bladder. There were no clips placed distally to the veru that would affect the external sphincter. The lateral lobe obstruction was relieved and there was an adequate anterior channel. At this time hemostasis was achieved. We filled the bladder and terminated the case.   The patient tolerated well and was moved to PACU in good position.     Plan  Urinate in PACU  Follow up as outpatient: 4-6 weeks with PVR

## 2022-10-31 NOTE — PROGRESS NOTES
Pt returned to [facility] room 8. Vitals and assessment as charted. 0.9 infusing, @400ml to count from PACU. Pt has cielo crackers, pudding and apple juice. Family at the bedside. Pt and family verbalized understanding of discharge criteria and call light use. Call light in reach.

## 2022-10-31 NOTE — H&P
[facility]  Urology H&P Note     Patient:  Sunny Garrison  MRN: [id number]  YOB: 1950    ATTENDING: Esteban Lopez MD     CHIEF COMPLAINT:  BPH    HISTORY OF PRESENT ILLNESS:   The patient is a 67 y.o. male who presents with BPH    Patient's old records, notes and chart reviewed and summarized above. Past Medical History:    Past Medical History:   Diagnosis Date    Arthritis     ASHD (arteriosclerotic heart disease)     CAD (coronary artery disease)     Status post bypass    Colon polyps 2009    colonoscopy  tubular  adenoma   rinesmith    Decreased sense of smell 05/22/2015    Epistaxis 05/22/2015    History of blood transfusion 2005    ? ???? with by pass? ??     Hyperlipidemia     Hypertension     Kidney stones 07/2022    dr Carolyn David    Mild carotid artery disease [facility]) 2013    Nasal obstruction 05/22/2015    Nasal septal perforation 05/22/2015       Past Surgical History:    Past Surgical History:   Procedure Laterality Date    BLADDER SURGERY Right 12/20/2021    CYSTO, URETEROSCOPY, RIGHT RETROGRADE PYELOGRAM, URETERAL DILATION, WITH URETERAL BX RIGHT URETERAL STENT PLACEMENT performed by Danna Moy MD at [address] [place] [address] Right 1/31/2022    CYSTOSCOPY RIGHT RETROGRADE PYELOGRAM, RIGHT STENT EXCHANGE performed by Danna Moy MD at [address] [place] [address] Right 4/4/2022    CYSTOSCOPY, RIGHT STENT EXCHANGE, RIGHT RETROGRADE PYELOGRAM, ROBOTIC RIGHT URETERAL REIMPLANT performed by Danna Moy MD at [address] [place] [address] Right 6/20/2022    Cystoscopy Right Retrograde Pyelogram Right Ureteral Stent Removal performed by Danna Moy MD at [address]  2005    6 by pass    COLONOSCOPY  12/2019    Dr Salvador Rosario  colon polyps 8//2014 9-2019    COLONOSCOPY Left 09/13/2019    COLONOSCOPY POLYPECTOMY SNARE/COLD BIOPSY performed by Mariia Obrien MD at [address] [place] [address] ARTERY BYPASS GRAFT  11/2005    EXCISION OF AURAL MASS      NASAL POLYP SURGERY  06/05/2015    BIOPSY OF NASAL POLYP MIDDLE TURBINATE AND BIOPSY SEPTAL [address]    SKIN CANCER EXCISION  2018    on back    TONSILLECTOMY      as a child    URETER SURGERY Left 7/27/2022    CYSTO, LEFT URETEROSCOPY, LASER LITHOTRIPSY, BASKET RETRIEVAL OF STONE FRAGMENTS, STENT performed by Nadege Cottrell MD at [facility]       Medications Prior to Admission:   Prior to Admission medications    Medication Sig Start Date End Date Taking? Authorizing Provider   tamsulosin (FLOMAX) 0.4 MG capsule Take 2 capsules by mouth in the morning. Take one capsule daily to facilitate passage of ureteral stone. 8/8/22   Shai Dave MD   hydroCHLOROthiazide (HYDRODIURIL) 25 MG tablet Take 0.5 tablets by mouth daily 4/20/22 10/27/22  Jerome Botello MD   losartan (COZAAR) 100 MG tablet TAKE 1 TABLET DAILY 4/12/22   Jerome Botello MD   atorvastatin (LIPITOR) 40 MG tablet TAKE 1 TABLET DAILY 3/5/22   RASHAD Jimenes CNP   metoprolol tartrate (LOPRESSOR) 25 MG tablet TAKE 1 TABLET TWICE A DAY 3/5/22   RASHAD Jimenes CNP   vitamin D (ERGOCALCIFEROL) 1.25 MG (70348 UT) CAPS capsule TAKE 1 CAPSULE ONCE A WEEK 2/21/22   RASHAD Jimenes CNP   aspirin 81 MG EC tablet Take 81 mg by mouth 2 times daily     Historical Provider, MD   acetaminophen (TYLENOL) 650 MG extended release tablet Take 650 mg by mouth every 8 hours as needed for Pain    Historical Provider, MD   Multiple Vitamin (MULTI-VITAMIN) TABS Take by mouth daily     Historical Provider, MD       Allergies:  Patient has no known allergies.     Social History:    Social History     Socioeconomic History    Marital status:      Spouse name: Not on file    Number of children: Not on file    Years of education: Not on file    Highest education level: Not on file   Occupational History    Not on file   Tobacco Use    Smoking status: Former     Packs/day: 1.50     Years: 30.00     Pack years: 45.00     Types: Cigarettes     Quit date: 6/3/2002     Years since quittin.4    Smokeless tobacco: Never   Vaping Use    Vaping Use: Never used   Substance and Sexual Activity    Alcohol use: Yes     Comment: seldom    Drug use: No    Sexual activity: Not Currently     Partners: Female   Other Topics Concern    Not on file   Social History Narrative    Not on file     Social Determinants of Health     Financial Resource Strain: Low Risk     Difficulty of Paying Living Expenses: Not hard at all   Food Insecurity: No Food Insecurity    Worried About Running Out of Food in the Last Year: Never true    Ran Out of Food in the Last Year: Never true   Transportation Needs: Not on file   Physical Activity: Not on file   Stress: Not on file   Social Connections: Not on file   Intimate Partner Violence: Not on file   Housing Stability: Not on file       Family History:    Family History   Problem Relation Age of Onset    Heart Disease Mother     Cancer Father         throat    Diabetes Sister     Asthma Brother        REVIEW OF SYSTEMS:  All systems reviewed and negative except for that already noted in the HPI. Physical Exam:      No data found. Constitutional: Patient in no acute distress; Neuro: alert and oriented to person place and time. Psych: Mood and affect normal.  Skin: Normal  Lungs: Respiratory effort normal  Cardiovascular:  Normal peripheral pulses  Abdomen: Soft, non-tender, non-distended with no CVA, flank pain, hepatosplenomegaly or hernia. Kidneys normal.  Bladder non-tender and not distended.   Lymphatics: no palpable lymphadenopathy        Assessment and Plan   Impression:    Patient Active Problem List   Diagnosis    CAD (coronary artery disease)    Hypertension    Hyperlipidemia    S/P CABG x     CAD (coronary artery disease)    Colon polyps    Ureteral stenosis, right    Ureteral stricture    Kidney stones       Plan: Urolift  Risks benefits and alternative procedures are explained, informed consent is obtained, and the patient elects to proceed.

## 2022-10-31 NOTE — DISCHARGE INSTRUCTIONS
Discharge instructions: Urolift of the prostate:   Void before discharge. Contue flomax for 1 month    You may see blood in the urine after the procedure. This should resolve over the next couple days. Please stay hydrated. You may experience frequency/urgency of urination after the procedure. We expect these symptoms to improve over the next couple weeks. If you cant urinate you may need a catheter, call or present to ER for evaluation    Tylenol for pain control  Pt ok to discharge home in good condition  No heavy lifting, >10 lbs for a couple weeks  Pt should avoid strenuous activity for a couple weeks, including mowing   Pt should walk moderately at home  Pt ok to shower   Pt may resume diet as tolerated  Pt should take Rx as directed  No driving while on narcotics  Please call attending physician or hospital  with questions  Call or Present to ED if fever (> 101F), intractable nausea vomiting or pain. Blood thinning medications: hold for 3-5 days till hematuria resolves.     Pt should follow up with Dr. Jasbir Sherman, 4-6 weeks, call to confirm appointment

## 2022-10-31 NOTE — ANESTHESIA POSTPROCEDURE EVALUATION
Department of Anesthesiology  Postprocedure Note    Patient: Mary Ann Osuna  MRN: [id number]  YOB: 1950  Date of evaluation: 10/31/2022      Procedure Summary     Date: 10/31/22 Room / Location: [facility] / Kassi Roger    Anesthesia Start: 0664 Anesthesia Stop: 9717    Procedure: Cystoscopy Urolift (Bladder) Diagnosis:       Benign prostatic hyperplasia with lower urinary tract symptoms, symptom details unspecified      (Benign prostatic hyperplasia with lower urinary tract symptoms, symptom details unspecified [N40.1])    Surgeons: Kamryn Ramirez MD Responsible Provider: Liset Galloway DO    Anesthesia Type: MAC ASA Status: 3          Anesthesia Type: No value filed.     Moira Phase I: Moira Score: 10    Moira Phase II: Moira Score: 10      Anesthesia Post Evaluation    Patient location during evaluation: bedside  Patient participation: complete - patient participated  Level of consciousness: awake  Airway patency: patent  Nausea & Vomiting: no vomiting and no nausea  Cardiovascular status: hemodynamically stable  Respiratory status: acceptable  Hydration status: stable

## 2022-10-31 NOTE — ANESTHESIA PRE PROCEDURE
Department of Anesthesiology  Preprocedure Note       Name:  Moira Reddy   Age:  67 y.o.  :  1950                                          MRN:  [id number]         Date:  10/31/2022      Surgeon: Darien Kuhn):  Augusta Greenberg MD    Procedure: Procedure(s):  Cystoscopy Urolift    Medications prior to admission:   Prior to Admission medications    Medication Sig Start Date End Date Taking? Authorizing Provider   tamsulosin (FLOMAX) 0.4 MG capsule Take 2 capsules by mouth in the morning. Take one capsule daily to facilitate passage of ureteral stone.  22   Augusta Greenberg MD   hydroCHLOROthiazide (HYDRODIURIL) 25 MG tablet Take 0.5 tablets by mouth daily 4/20/22 10/27/22  Yamini Atkinson MD   losartan (COZAAR) 100 MG tablet TAKE 1 TABLET DAILY 22   Yamini Atkinson MD   atorvastatin (LIPITOR) 40 MG tablet TAKE 1 TABLET DAILY 3/5/22   RASHAD Nelson CNP   metoprolol tartrate (LOPRESSOR) 25 MG tablet TAKE 1 TABLET TWICE A DAY 3/5/22   RASHAD Nelson CNP   vitamin D (ERGOCALCIFEROL) 1.25 MG (89494 UT) CAPS capsule TAKE 1 CAPSULE ONCE A WEEK 22   RASHAD Nelson CNP   aspirin 81 MG EC tablet Take 81 mg by mouth 2 times daily     Historical Provider, MD   acetaminophen (TYLENOL) 650 MG extended release tablet Take 650 mg by mouth every 8 hours as needed for Pain    Historical Provider, MD   Multiple Vitamin (MULTI-VITAMIN) TABS Take by mouth daily     Historical Provider, MD       Current medications:    Current Facility-Administered Medications   Medication Dose Route Frequency Provider Last Rate Last Admin    0.9 % sodium chloride infusion   IntraVENous Continuous Augusta Greenberg MD        ceFAZolin (ANCEF) 2000 mg in dextrose 5 % 50 mL IVPB  2,000 mg IntraVENous 30 Min Pre-Op Augusta Greenberg MD           Allergies:  No Known Allergies    Problem List:    Patient Active Problem List   Diagnosis Code    CAD (coronary artery disease) I25.10    Hypertension I10    Hyperlipidemia E78.5    S/P CABG x 6,2007 Z95.1    CAD (coronary artery disease) I25.10    Colon polyps K63.5    Ureteral stenosis, right N13.5    Ureteral stricture N13.5    Kidney stones N20.0       Past Medical History:        Diagnosis Date    Arthritis     ASHD (arteriosclerotic heart disease)     CAD (coronary artery disease)     Status post bypass    Colon polyps 2009    colonoscopy  tubular  adenoma   rinesmith    Decreased sense of smell 05/22/2015    Epistaxis 05/22/2015    History of blood transfusion 2005    ? ???? with by pass? ??    Hyperlipidemia     Hypertension     Kidney stones 07/2022    dr Barajas Apo Mild carotid artery disease [facility]) 2013    Nasal obstruction 05/22/2015    Nasal septal perforation 05/22/2015       Past Surgical History:        Procedure Laterality Date    BLADDER SURGERY Right 12/20/2021    CYSTO, URETEROSCOPY, RIGHT RETROGRADE PYELOGRAM, URETERAL DILATION, WITH URETERAL BX RIGHT URETERAL STENT PLACEMENT performed by Shai Daniel., MD at [facility] 1/31/2022    CYSTOSCOPY RIGHT RETROGRADE PYELOGRAM, RIGHT STENT EXCHANGE performed by Shai Daniel., MD at [facility] 4/4/2022    CYSTOSCOPY, RIGHT STENT EXCHANGE, RIGHT RETROGRADE PYELOGRAM, ROBOTIC RIGHT URETERAL REIMPLANT performed by Shai Daniel., MD at [facility] 6/20/2022    Cystoscopy Right Retrograde Pyelogram Right Ureteral Stent Removal performed by Shai Daniel., MD at [address]  2005    6 by pass    COLONOSCOPY  12/2019    Dr Gladys Gao  colon polyps 8//2014 9-2019    COLONOSCOPY Left 09/13/2019    COLONOSCOPY POLYPECTOMY SNARE/COLD BIOPSY performed by Anabel Tapia MD at [address]  11/2005    EXCISION OF AURAL MASS      NASAL POLYP SURGERY  06/05/2015    BIOPSY OF NASAL POLYP MIDDLE TURBINATE AND BIOPSY [facility] SKIN CANCER EXCISION  2018    on back    TONSILLECTOMY      as a child    URETER SURGERY Left 2022    CYSTO, LEFT URETEROSCOPY, LASER LITHOTRIPSY, BASKET RETRIEVAL OF STONE FRAGMENTS, STENT performed by Ebony Duarte MD at [facility] History:    Social History     Tobacco Use    Smoking status: Former     Packs/day: 1.50     Years: 30.00     Pack years: 45.00     Types: Cigarettes     Quit date: 6/3/2002     Years since quittin.4    Smokeless tobacco: Never   Substance Use Topics    Alcohol use: Yes     Comment: seldom                                Counseling given: Not Answered      Vital Signs (Current):   Vitals:    10/25/22 1305 10/31/22 1040   BP:  135/78   Pulse:  60   Resp:  16   Temp:  97.6 °F (36.4 °C)   TempSrc:  Temporal   SpO2:  96%   Weight: 230 lb (104.3 kg) 233 lb 8 oz (105.9 kg)   Height: 5' 11\" (1.803 m) 5' 11\" (1.803 m)                                              BP Readings from Last 3 Encounters:   10/31/22 135/78   10/27/22 132/86   22 134/82       NPO Status:                                                                                 BMI:   Wt Readings from Last 3 Encounters:   10/31/22 233 lb 8 oz (105.9 kg)   10/27/22 238 lb 12.8 oz (108.3 kg)   22 230 lb (104.3 kg)     Body mass index is 32.57 kg/m².     CBC:   Lab Results   Component Value Date/Time    WBC 7.9 10/17/2022 09:38 AM    RBC 4.24 10/17/2022 09:38 AM    RBC 4.44 2020 08:11 AM    RBC 3 2020 08:11 AM    HGB 13.7 10/17/2022 09:38 AM    HCT 40.6 10/17/2022 09:38 AM    MCV 95.8 10/17/2022 09:38 AM    RDW 13.8 2020 08:11 AM     10/17/2022 09:38 AM       CMP:   Lab Results   Component Value Date/Time     10/17/2022 09:38 AM    K 3.8 10/17/2022 09:38 AM    K 4.8 2021 04:05 PM     10/17/2022 09:38 AM    CO2 22 10/17/2022 09:38 AM    BUN 22 10/17/2022 09:38 AM    CREATININE 1.1 10/17/2022 09:38 AM    LABGLOM >60 10/17/2022 09:38 AM GLUCOSE 97 10/17/2022 09:38 AM    GLUCOSE 97 06/09/2020 08:11 AM    PROT 7.6 07/26/2022 10:00 AM    CALCIUM 9.3 10/17/2022 09:38 AM    BILITOT 0.4 07/26/2022 10:00 AM    BILITOT Negative 06/09/2020 08:11 AM    ALKPHOS 171 07/26/2022 10:00 AM    AST 19 07/26/2022 10:00 AM    ALT 19 07/26/2022 10:00 AM       POC Tests: No results for input(s): POCGLU, POCNA, POCK, POCCL, POCBUN, POCHEMO, POCHCT in the last 72 hours. Coags:   Lab Results   Component Value Date/Time    INR 1.09 06/03/2015 02:20 PM    APTT 25.7 06/03/2015 02:20 PM       HCG (If Applicable): No results found for: PREGTESTUR, PREGSERUM, HCG, HCGQUANT     ABGs: No results found for: PHART, PO2ART, RZI7SWC, ZSX9LAP, BEART, A7DFJWPX     Type & Screen (If Applicable):  No results found for: LABABO, LABRH    Drug/Infectious Status (If Applicable):  No results found for: HIV, HEPCAB    COVID-19 Screening (If Applicable): No results found for: COVID19        Anesthesia Evaluation  Patient summary reviewed  Airway: Mallampati: III  TM distance: >3 FB   Neck ROM: full  Mouth opening: > = 3 FB   Dental:          Pulmonary:                              Cardiovascular:    (+) hypertension:, CABG/stent:,       ECG reviewed                        Neuro/Psych:               GI/Hepatic/Renal:             Endo/Other:                     Abdominal:             Vascular: Other Findings:           Anesthesia Plan      MAC     ASA 3       Induction: intravenous. Anesthetic plan and risks discussed with patient and spouse. Plan discussed with FAISAL. Jacoby Pandya  [address] DO Juan R   10/31/2022

## 2022-12-07 ENCOUNTER — OFFICE VISIT (OUTPATIENT)
Dept: FAMILY MEDICINE CLINIC | Age: 72
End: 2022-12-07

## 2022-12-07 VITALS
HEART RATE: 60 BPM | WEIGHT: 234 LBS | RESPIRATION RATE: 16 BRPM | DIASTOLIC BLOOD PRESSURE: 74 MMHG | BODY MASS INDEX: 32.76 KG/M2 | HEIGHT: 71 IN | SYSTOLIC BLOOD PRESSURE: 136 MMHG

## 2022-12-07 DIAGNOSIS — E78.5 HYPERLIPIDEMIA, UNSPECIFIED HYPERLIPIDEMIA TYPE: ICD-10-CM

## 2022-12-07 DIAGNOSIS — I10 PRIMARY HYPERTENSION: ICD-10-CM

## 2022-12-07 DIAGNOSIS — D12.6 ADENOMATOUS POLYP OF COLON, UNSPECIFIED PART OF COLON: ICD-10-CM

## 2022-12-07 DIAGNOSIS — N40.0 BENIGN PROSTATIC HYPERPLASIA WITHOUT LOWER URINARY TRACT SYMPTOMS: ICD-10-CM

## 2022-12-07 DIAGNOSIS — I10 ESSENTIAL HYPERTENSION: ICD-10-CM

## 2022-12-07 DIAGNOSIS — Z00.00 MEDICARE ANNUAL WELLNESS VISIT, SUBSEQUENT: Primary | ICD-10-CM

## 2022-12-07 DIAGNOSIS — I25.810 CORONARY ARTERY DISEASE INVOLVING AUTOLOGOUS VEIN CORONARY BYPASS GRAFT WITHOUT ANGINA PECTORIS: ICD-10-CM

## 2022-12-07 ASSESSMENT — LIFESTYLE VARIABLES
HOW MANY STANDARD DRINKS CONTAINING ALCOHOL DO YOU HAVE ON A TYPICAL DAY: 1 OR 2
HOW OFTEN DO YOU HAVE A DRINK CONTAINING ALCOHOL: MONTHLY OR LESS

## 2022-12-07 NOTE — PROGRESS NOTES
Medicare Annual Wellness Visit    Corazon Dunham is here for Medicare AWV    Assessment & Plan            Subjective       Patient's complete Health Risk Assessment and screening values have been reviewed and are found in Flowsheets. The following problems were reviewed today and where indicated follow up appointments were made and/or referrals ordered. Positive Risk Factor Screenings with Interventions:       Cognitive: Words recalled: 0 Words Recalled   Clock Drawing Test (CDT): Normal   Total Score: (!) 2   Total Score Interpretation: Abnormal Mini-Cog      Interventions:       Slight  memory and  no  issues             Weight and Activity:  Physical Activity: Insufficiently Active    Days of Exercise per Week: 3 days    Minutes of Exercise per Session: 30 min     On average, how many days per week do you engage in moderate to strenuous exercise (like a brisk walk)?: 3 days  Have you lost any weight without trying in the past 3 months?: No  Body mass index: (!) 32.63    Obesity Interventions:     Stable and   active  walks  needs  exercise             Vision Screen:  Do you have difficulty driving, watching TV, or doing any of your daily activities because of your eyesight?: No  Have you had an eye exam within the past year?: (!) No  No results found. Interventions:    Eyes   checked  See AVS for additional education material  See A/P for any pertinent orders    Safety:  Do all of your stairways have a railing or banister?: (!) No    Interventions:  No  stairs and a  few    outside   See AVS for additional education material  See A/P for any pertinent orders                     Objective   Vitals:    12/07/22 1102   BP: 136/74   Site: Right Upper Arm   Position: Sitting   Cuff Size: Medium Adult   Pulse: 60   Resp: 16   Weight: 234 lb (106.1 kg)   Height: 5' 11\" (1.803 m)      Body mass index is 32.64 kg/m². Physical Exam  Vitals and nursing note reviewed.    Constitutional:       Appearance: He is well-developed. HENT:      Head: Normocephalic and atraumatic. Right Ear: External ear normal.      Left Ear: External ear normal.      Nose: Nose normal.   Eyes:      Conjunctiva/sclera: Conjunctivae normal.      Pupils: Pupils are equal, round, and reactive to light. Comments: Fundi nl   Neck:      Thyroid: No thyromegaly. Cardiovascular:      Rate and Rhythm: Normal rate and regular rhythm. Heart sounds: Normal heart sounds. Pulmonary:      Effort: Pulmonary effort is normal.      Breath sounds: Normal breath sounds. No wheezing or rales. Abdominal:      General: Bowel sounds are normal.      Palpations: Abdomen is soft. There is no mass. Tenderness: There is no abdominal tenderness. Musculoskeletal:         General: Normal range of motion. Cervical back: Normal range of motion and neck supple. Lymphadenopathy:      Cervical: No cervical adenopathy. Skin:     General: Skin is warm and dry. Findings: No rash. Neurological:      Mental Status: He is alert and oriented to person, place, and time. Cranial Nerves: No cranial nerve deficit. Deep Tendon Reflexes: Reflexes are normal and symmetric. No Known Allergies  Prior to Visit Medications    Medication Sig Taking?  Authorizing Provider   hydroCHLOROthiazide (HYDRODIURIL) 25 MG tablet Take 0.5 tablets by mouth daily Yes Jerome Botello MD   losartan (COZAAR) 100 MG tablet TAKE 1 TABLET DAILY Yes Jerome Botello MD   atorvastatin (LIPITOR) 40 MG tablet TAKE 1 TABLET DAILY Yes RASHAD Jimenes CNP   metoprolol tartrate (LOPRESSOR) 25 MG tablet TAKE 1 TABLET TWICE A DAY Yes RASHAD Jimenes CNP   vitamin D (ERGOCALCIFEROL) 1.25 MG (42603 UT) CAPS capsule TAKE 1 CAPSULE ONCE A WEEK Yes RASHAD Jimenes CNP   aspirin 81 MG EC tablet Take 81 mg by mouth 2 times daily  Yes Historical Provider, MD   acetaminophen (TYLENOL) 650 MG extended release tablet Take 650 mg by mouth every 8 hours as needed for Pain Yes Historical Provider, MD   Multiple Vitamin (MULTI-VITAMIN) TABS Take by mouth daily  Yes Historical Provider, MD Bailey (Including outside providers/suppliers regularly involved in providing care):   Patient Care Team:  Marjan Kimball MD as PCP - General  Marjan Kimball MD as PCP - [facility] Provider  Howie Edmondson MD as Consulting Physician (Internal Medicine)  Muriel Louis MD as Cardiologist (Cardiology)     Reviewed and updated this visit:  Tobacco  Allergies  Meds  Med Hx  Surg Hx  Soc Hx  Fam Hx          Ashd    stable    dr Jennifer Ochoa       Cysto  lift   and better  and  dilatation        Prostate  stable      Htn   stable        Lipids   stable     Impressiion        ICD-10-CM    1. Primary hypertension  I10       2. Hyperlipidemia, unspecified hyperlipidemia type  E78.5 Lipid Panel      3. Coronary artery disease involving autologous vein coronary bypass graft without angina pectoris  I25.810       4. Essential hypertension  I10 TSH with Reflex      5. Adenomatous polyp of colon, unspecified part of colon  D12.6       6.  Benign prostatic hyperplasia without lower urinary tract symptoms  N40.0 PSA, Prostatic Specific Antigen         PLAN    Current Outpatient Medications   Medication Sig Dispense Refill    hydroCHLOROthiazide (HYDRODIURIL) 25 MG tablet Take 0.5 tablets by mouth daily 90 tablet 1    losartan (COZAAR) 100 MG tablet TAKE 1 TABLET DAILY 90 tablet 2    atorvastatin (LIPITOR) 40 MG tablet TAKE 1 TABLET DAILY 90 tablet 3    metoprolol tartrate (LOPRESSOR) 25 MG tablet TAKE 1 TABLET TWICE A  tablet 3    vitamin D (ERGOCALCIFEROL) 1.25 MG (56223 UT) CAPS capsule TAKE 1 CAPSULE ONCE A WEEK 12 capsule 3    aspirin 81 MG EC tablet Take 81 mg by mouth 2 times daily       acetaminophen (TYLENOL) 650 MG extended release tablet Take 650 mg by mouth every 8 hours as needed for Pain      Multiple Vitamin (MULTI-VITAMIN) TABS Take by mouth daily        No current facility-administered medications for this visit.            Labs  in   2 weeks and  see  in     4 mths

## 2022-12-07 NOTE — PATIENT INSTRUCTIONS
Learning About Vision Tests  What are vision tests? The four most common vision tests are visual acuity tests, refraction, visual field tests, and color vision tests. Visual acuity (sharpness) tests  These tests are used: To see if you need glasses or contact lenses. To monitor an eye problem. To check an eye injury. Visual acuity tests are done as part of routine exams. You may also have this test when you get your 's license or apply for some types of jobs. Visual field tests  These tests are used: To check for vision loss in any area of your range of vision. To screen for certain eye diseases. To look for nerve damage after a stroke, head injury, or other problem that could reduce blood flow to the brain. Refraction and color tests  A refraction test is done to find the right prescription for glasses and contact lenses. A color vision test is done to check for color blindness. Color vision is often tested as part of a routine exam. You may also have this test when you apply for a job where recognizing different colors is important, such as , electronics, or the [employer]. How are vision tests done? Visual acuity test   You cover one eye at a time. You read aloud from a wall chart across the room. You read aloud from a small card that you hold in your hand. Refraction   You look into a special device. The device puts lenses of different strengths in front of each eye to see how strong your glasses or contact lenses need to be. Visual field tests   Your doctor may have you look through special machines. Or your doctor may simply have you stare straight ahead while they move a finger into and out of your field of vision. Color vision test   You look at pieces of printed test patterns in various colors. You say what number or symbol you see. Your doctor may have you trace the number or symbol using a pointer. How do these tests feel?   There is very little chance of having a problem from this test. If dilating drops are used for a vision test, they may make the eyes sting and cause a medicine taste in the mouth. Follow-up care is a key part of your treatment and safety. Be sure to make and go to all appointments, and call your doctor if you are having problems. It's also a good idea to know your test results and keep a list of the medicines you take. Where can you learn more? Go to [email or web address] org and sign in to your [employer] account. Enter G551 in the [employer] box to learn more about \"Learning About Vision Tests. \"     If you do not have an account, please click on the \"Sign Up Now\" link. Current as of: January 24, 2022               Content Version: 13.4  © 2006-2022 Healthwise, [employer]. Care instructions adapted under license by [facility] ([facility]). If you have questions about a medical condition or this instruction, always ask your healthcare professional. [place] [address] any warranty or liability for your use of this information. Advance Directives: Care Instructions  Overview  An advance directive is a legal way to state your wishes at the end of your life. It tells your family and your doctor what to do if you can't say what you want. There are two main types of advance directives. You can change them any time your wishes change. Living will. This form tells your family and your doctor your wishes about life support and other treatment. The form is also called a declaration. Medical power of . This form lets you name a person to make treatment decisions for you when you can't speak for yourself. This person is called a health care agent (health care proxy, health care surrogate). The form is also called a durable power of  for health care.   If you do not have an advance directive, decisions about your medical care may be made by a family member, or by a doctor or a  who doesn't know you. It may help to think of an advance directive as a gift to the people who care for you. If you have one, they won't have to make tough decisions by themselves. Follow-up care is a key part of your treatment and safety. Be sure to make and go to all appointments, and call your doctor if you are having problems. It's also a good idea to know your test results and keep a list of the medicines you take. What should you include in an advance directive? Many states have a unique advance directive form. (It may ask you to address specific issues.) Or you might use a universal form that's approved by many states. If your form doesn't tell you what to address, it may be hard to know what to include in your advance directive. Use the questions below to help you get started. Who do you want to make decisions about your medical care if you are not able to? What life-support measures do you want if you have a serious illness that gets worse over time or can't be cured? What are you most afraid of that might happen? (Maybe you're afraid of having pain, losing your independence, or being kept alive by machines.)  Where would you prefer to die? (Your home? A hospital? A nursing home?)  Do you want to donate your organs when you die? Do you want certain Faith practices performed before you die? When should you call for help? Be sure to contact your doctor if you have any questions. Where can you learn more? Go to [email or web address] org and sign in to your [employer] account. Enter R264 in the [facility] box to learn more about \"Advance Directives: Care Instructions. \"     If you do not have an account, please click on the \"Sign Up Now\" link. Current as of: June 16, 2022               Content Version: 13.4  © 2626-4326 Healthwise, Incorporated. Care instructions adapted under license by [facility] ([facility]).  If you have questions about a medical condition or this instruction, always ask your healthcare professional. [place] [address] any warranty or liability for your use of this information. Personalized Preventive Plan for Fara Dash - 12/7/2022  Medicare offers a range of preventive health benefits. Some of the tests and screenings are paid in full while other may be subject to a deductible, co-insurance, and/or copay. Some of these benefits include a comprehensive review of your medical history including lifestyle, illnesses that may run in your family, and various assessments and screenings as appropriate. After reviewing your medical record and screening and assessments performed today your provider may have ordered immunizations, labs, imaging, and/or referrals for you. A list of these orders (if applicable) as well as your Preventive Care list are included within your After Visit Summary for your review. Other Preventive Recommendations:    A preventive eye exam performed by an eye specialist is recommended every 1-2 years to screen for glaucoma; cataracts, macular degeneration, and other eye disorders. A preventive dental visit is recommended every 6 months. Try to get at least 150 minutes of exercise per week or 10,000 steps per day on a pedometer . Order or download the FREE \"Exercise & Physical Activity: Your Everyday Guide\" from [employer] [facility] [employer]. Call 7-147.302.7980 or search [employer] [facility] [employer] online. You need 2610-6844 mg of calcium and 4223-8404 IU of vitamin D per day. It is possible to meet your calcium requirement with diet alone, but a vitamin D supplement is usually necessary to meet this goal.  When exposed to the sun, use a sunscreen that protects against both UVA and UVB radiation with an SPF of 30 or greater. Reapply every 2 to 3 hours or after sweating, drying off with a towel, or swimming. Always wear a seat belt when traveling in a car.  Always wear a helmet when riding a bicycle or motorcycle.

## 2022-12-16 ENCOUNTER — OFFICE VISIT (OUTPATIENT)
Dept: UROLOGY | Age: 72
End: 2022-12-16
Payer: MEDICARE

## 2022-12-16 ENCOUNTER — TELEPHONE (OUTPATIENT)
Dept: UROLOGY | Age: 72
End: 2022-12-16

## 2022-12-16 VITALS — HEIGHT: 71 IN | WEIGHT: 235 LBS | BODY MASS INDEX: 32.9 KG/M2

## 2022-12-16 DIAGNOSIS — N13.8 BPH WITH OBSTRUCTION/LOWER URINARY TRACT SYMPTOMS: Primary | ICD-10-CM

## 2022-12-16 DIAGNOSIS — N40.1 BPH WITH OBSTRUCTION/LOWER URINARY TRACT SYMPTOMS: Primary | ICD-10-CM

## 2022-12-16 LAB — POST VOID RESIDUAL (PVR): 346 ML

## 2022-12-16 PROCEDURE — 99214 OFFICE O/P EST MOD 30 MIN: CPT | Performed by: UROLOGY

## 2022-12-16 PROCEDURE — 1123F ACP DISCUSS/DSCN MKR DOCD: CPT | Performed by: UROLOGY

## 2022-12-16 PROCEDURE — 51798 US URINE CAPACITY MEASURE: CPT | Performed by: UROLOGY

## 2022-12-16 NOTE — PROGRESS NOTES
Dr. Andreina Estrada MD MD  [facility]rology [facility] Consultation / New Patient Visit    Patient:  Ivone Baxter  YOB: 1950  Date: 12/16/2022  Consult requested from Svetlana Telles MD     HISTORY OF PRESENT ILLNESS:   The patient is a 67 y.o. male who presents today for follow-up for the following problem(s): Right ureteral stricture  Overall the problem(s) : are worsening. Associated Symptoms: No dysuria, gross hematuria. Pain Severity:      Today visit:   12/16/22  Presents with BPH s/p Urolift, PVR: 340 ml,  is elevated but symptoms improved. AUASS: 10/1      9/12/22   Cystoscopy Operative Note  Surgeon: Andreina Estrada MD   Anesthesia: Urethral 2%  Indications: BPH with LUTs  Position: supine  Findings:   The patient was prepped and draped in the usual sterile fashion. The flexible cystoscope was advanced through the urethra and into the bladder. The bladder was thoroughly inspected and the following was noted:    Residual Urine: Minimal / Moderate / Significant  Urethra: No abnormalities of the urethra are noted. Prostate: Med/large gland (< 80 gm) Complete obstruction by lateral small median lobe of prostate. Bladder: No tumors or CIS noted. No bladder diverticulum. Moderate  trabeculation noted. Right Reimplanted ureter. Ureters: Clear efflux from both ureters. Orifices with normal configuration and location. The cystoscope was removed. The patient tolerated the procedure well. Plan: good candidate for urolift vs greenlight PVP      Summary of old records:   (Patient's old records, notes and chart reviewed and summarized above.)  Presents with history of right hydronephrosis, s/p right robo ureteral reimplant (4/4/22) - secondary to stricture (no cancer on biopsy). Recently had a left Ureteroscopy for obstructing stone Hazdulce Cedeno) - stent removed. No other non obstructing stones on CT scan. - Right kidney draining well, no hydro.        Last several PSA's:  Lab Results   Component Value Date    PSA 5.11 (H) 12/17/2021    PSA 3.89 (H) 07/10/2019    PSA 4.18 (H) 05/17/2018       Last total testosterone:  No results found for: TESTOSTERONE    Urinalysis today:  Results for POC orders placed in visit on 12/16/22   poct post void residual   Result Value Ref Range    post void residual 346 ml           Last BUN and creatinine:  Lab Results   Component Value Date    BUN 22 10/17/2022     Lab Results   Component Value Date    CREATININE 1.1 10/17/2022       Imaging Reviewed during this Office Visit:   (results were independently reviewed by physician and radiology report verified)    PAST MEDICAL, FAMILY AND SOCIAL HISTORY:  Past Medical History:   Diagnosis Date    Arthritis     ASHD (arteriosclerotic heart disease)     CAD (coronary artery disease)     Status post bypass    Colon polyps 2009    colonoscopy  tubular  adenoma   rinesmith    Decreased sense of smell 05/22/2015    Epistaxis 05/22/2015    History of blood transfusion 2005    ? ???? with by pass? ??     Hyperlipidemia     Hypertension     Kidney stones 07/2022    dr Jasper Staples    Mild carotid artery disease [facility]) 2013    Nasal obstruction 05/22/2015    Nasal septal perforation 05/22/2015     Past Surgical History:   Procedure Laterality Date    BLADDER SURGERY Right 12/20/2021    CYSTO, URETEROSCOPY, RIGHT RETROGRADE PYELOGRAM, URETERAL DILATION, WITH URETERAL BX RIGHT URETERAL STENT PLACEMENT performed by Oswald Martinez MD at [address] [place] [address] Right 01/31/2022    CYSTOSCOPY RIGHT RETROGRADE PYELOGRAM, RIGHT STENT EXCHANGE performed by Oswald Martinez MD at [address] [place] [address] Right 04/04/2022    CYSTOSCOPY, RIGHT STENT EXCHANGE, RIGHT RETROGRADE PYELOGRAM, ROBOTIC RIGHT URETERAL REIMPLANT performed by Oswald Martinez MD at [address] [place] [address] Right 06/20/2022    Cystoscopy Right Retrograde Pyelogram Right Ureteral Stent Removal [facility]    CARDIAC SURGERY      6 by pass    COLONOSCOPY  2019    Dr Shady Burrell  colon polyps 2014-    COLONOSCOPY Left 2019    COLONOSCOPY POLYPECTOMY SNARE/COLD BIOPSY performed by Kyle Benitez MD at [address] [facility]  2005    CYSTOSCOPY N/A 10/31/2022    Cystoscopy Urolift performed by Christianne Roper MD at [facility]  2015    BIOPSY OF NASAL [place] SURGERY  2022    urolift    murtaugh    SKIN CANCER EXCISION      on back    TONSILLECTOMY      as a child    URETER SURGERY Left 2022    CYSTO, LEFT URETEROSCOPY, LASER LITHOTRIPSY, BASKET RETRIEVAL OF STONE FRAGMENTS, STENT performed by Ebony Duarte MD at [facility] History   Problem Relation Age of Onset    Heart Disease Mother     Cancer Father         throat    Diabetes Sister     Asthma Brother      Outpatient Medications Marked as Taking for the 22 encounter (Office Visit) with Christianne Roper MD   Medication Sig Dispense Refill    losartan (COZAAR) 100 MG tablet TAKE 1 TABLET DAILY 90 tablet 2    atorvastatin (LIPITOR) 40 MG tablet TAKE 1 TABLET DAILY 90 tablet 3    metoprolol tartrate (LOPRESSOR) 25 MG tablet TAKE 1 TABLET TWICE A  tablet 3    vitamin D (ERGOCALCIFEROL) 1.25 MG (15198 UT) CAPS capsule TAKE 1 CAPSULE ONCE A WEEK 12 capsule 3    aspirin 81 MG EC tablet Take 81 mg by mouth 2 times daily       acetaminophen (TYLENOL) 650 MG extended release tablet Take 650 mg by mouth every 8 hours as needed for Pain      Multiple Vitamin (MULTI-VITAMIN) TABS Take by mouth daily          Patient has no known allergies.   Social History     Tobacco Use   Smoking Status Former    Packs/day: 1.50    Years: 30.00    Pack years: 45.00    Types: Cigarettes    Quit date: 6/3/2002    Years since quittin.5   Smokeless Tobacco Never       Social History Substance and Sexual Activity   Alcohol Use Yes    Comment: seldom       REVIEW OF SYSTEMS:  Constitutional: negative  Eyes: negative  Respiratory: negative  Cardiovascular: negative  Gastrointestinal: negative  Musculoskeletal: negative  Genitourinary: negative  Skin: negative   Neurological: negative  Hematological/Lymphatic: negative  Psychological: negative    Physical Exam:    This a 67 y.o. male   There were no vitals filed for this visit. Constitutional: Patient in no acute distress   Neuro: alert and oriented to person place and time. Psych: Mood and affect normal.  Head: atraumatic normocephalic  Eyes: EOMi  HEENT: neck supple, trachea midline  Lungs: Respiratory effort normal  Cardiovascular:  Normal peripheral pulses  Abdomen: Soft, non-tender, non-distended, No CVA  Bladder: non-tender and not distended. FROMx4, no cyanosis clubbing edema  Skin: warm and dry    Assessment and Plan      1. BPH with obstruction/lower urinary tract symptoms           Plan:      No follow-ups on file. Right ureteral stricture - Robotic Right ureteral reimplant (4/4/22).    - Hydro resolved on CT scan  Kidney stone - resolved after URS hlll    BPH with urinary retention - continue flomax for now, not controlled - will increase flomax to 0.8 mg daily  - s/p Urolift - notices his symptoms improved but higher PVR: 300 ml.   - Restart flomax

## 2022-12-16 NOTE — TELEPHONE ENCOUNTER
Patient scheduled for US URINARY BLADDER  at [facility] MR on 12/23/22 ARRIVAL OF 1045AM FOR AN 11AM SCAN. DRINK WATER ONE HOUR PRIOR TO SCAN; ARRIVE WITH A FULL BLADDER.     Order  given to the patient in the office

## 2022-12-23 ENCOUNTER — HOSPITAL ENCOUNTER (OUTPATIENT)
Dept: ULTRASOUND IMAGING | Age: 72
Discharge: HOME | End: 2022-12-23
Payer: MEDICARE

## 2022-12-23 DIAGNOSIS — N13.8 BPH WITH OBSTRUCTION/LOWER URINARY TRACT SYMPTOMS: ICD-10-CM

## 2022-12-23 DIAGNOSIS — N40.1 BPH WITH OBSTRUCTION/LOWER URINARY TRACT SYMPTOMS: ICD-10-CM

## 2022-12-23 PROCEDURE — 76775 US EXAM ABDO BACK WALL LIM: CPT | Performed by: UROLOGY

## 2022-12-30 ENCOUNTER — OFFICE VISIT (OUTPATIENT)
Dept: UROLOGY | Age: 72
End: 2022-12-30
Payer: MEDICARE

## 2022-12-30 VITALS
HEIGHT: 71 IN | SYSTOLIC BLOOD PRESSURE: 132 MMHG | BODY MASS INDEX: 32.76 KG/M2 | DIASTOLIC BLOOD PRESSURE: 68 MMHG | WEIGHT: 234 LBS

## 2022-12-30 DIAGNOSIS — N13.8 BPH WITH OBSTRUCTION/LOWER URINARY TRACT SYMPTOMS: Primary | ICD-10-CM

## 2022-12-30 DIAGNOSIS — R33.9 URINARY RETENTION: ICD-10-CM

## 2022-12-30 DIAGNOSIS — N20.0 NEPHROLITHIASIS: ICD-10-CM

## 2022-12-30 DIAGNOSIS — N40.1 BPH WITH OBSTRUCTION/LOWER URINARY TRACT SYMPTOMS: Primary | ICD-10-CM

## 2022-12-30 LAB
BILIRUBIN URINE: NEGATIVE
BLOOD URINE, POC: NEGATIVE
CHARACTER, URINE: CLEAR
COLOR, URINE: YELLOW
GLUCOSE URINE: NEGATIVE MG/DL
KETONES, URINE: NEGATIVE
LEUKOCYTE CLUMPS, URINE: NEGATIVE
NITRITE, URINE: NEGATIVE
PH, URINE: 6 (ref 5–9)
POST VOID RESIDUAL (PVR): 313 ML
PROTEIN, URINE: NEGATIVE MG/DL
SPECIFIC GRAVITY, URINE: 1.02 (ref 1–1.03)
UROBILINOGEN, URINE: 0.2 EU/DL (ref 0–1)

## 2022-12-30 PROCEDURE — 3074F SYST BP LT 130 MM HG: CPT

## 2022-12-30 PROCEDURE — 3078F DIAST BP <80 MM HG: CPT

## 2022-12-30 PROCEDURE — 81003 URINALYSIS AUTO W/O SCOPE: CPT

## 2022-12-30 PROCEDURE — 1123F ACP DISCUSS/DSCN MKR DOCD: CPT

## 2022-12-30 PROCEDURE — 99213 OFFICE O/P EST LOW 20 MIN: CPT

## 2022-12-30 PROCEDURE — 51798 US URINE CAPACITY MEASURE: CPT

## 2022-12-30 RX ORDER — TAMSULOSIN HYDROCHLORIDE 0.4 MG/1
0.4 CAPSULE ORAL DAILY
COMMUNITY

## 2022-12-30 NOTE — PATIENT INSTRUCTIONS
Double void- use the restroom, wait a minute, and go again. Improve voiding. Let the office know if symptoms worsen.

## 2022-12-30 NOTE — PROGRESS NOTES
[address] [place] [id number]  Dept: [phone]  Loc: [phone]  Visit Date: 12/30/2022    KEVIN Gresham is a 67 y.o. male that presents to the urology clinic for urinary retention and BPH follow-up. S/P UroLift on 10/31/22 with Dr. Noemy Weaver. Was not emptying fully at last visit with Dr. Noemy Weaver on 12/16/22. Restarted on Flomax.  at previous appointment, slightly improved today at 313 mL. Despite post-void residual, patient notes significant symptom improvement. No longer having to get up at night. Better control, no incontinent episodes. Notes improvement in stream also. Patient has history of Right Ureteral Stricture, Right Ureteral Re-Implant on 4/4/22. Also has history of nephrolithiasis, noted to have obstructing 7 mm left ureteral calculus and hydronephrosis on CT Abd Pelvis from 7/26/22, showed resolution on repeat CT from 8/5/22. UA: Negative  Lab Results   Component Value Date/Time    APPEARANCE CLEAR 06/09/2020 08:11 AM    COLORU Yellow 12/30/2022 10:42 AM    COLORU YELLOW 07/26/2022 10:40 AM    LABSPEC 1.020 12/30/2022 10:42 AM    LABPH 6.00 12/30/2022 10:42 AM    NITRU Negative 12/30/2022 10:42 AM    GLUCOSEU Negative 12/30/2022 10:42 AM    KETUA Negative 12/30/2022 10:42 AM    UROBILINOGEN 0.20 12/30/2022 10:42 AM    BILIRUBINUR Negative 12/30/2022 10:42 AM    OCBU Negative 06/09/2020 08:11 AM      PVR: 313 mL      I independently reviewed and verified the images and reports from:    [address]    Result Date: 12/23/2022  PROCEDURE: US URINARY BLADDER LIMITED CLINICAL INFORMATION: 74-year-old male with a history of BPH and urinary obstruction . COMPARISON: No prior study. TECHNIQUE: Grayscale and color images were obtained of the urinary bladder. FINDINGS: Pre-void 372 mL Post void 247 mL The prostate gland is enlarged and is causing mass effect on the urinary bladder. The prostate measures 6 x 5.6 x 5.7 cm. There is no urinary bladder wall thickening. Bilateral ureteral jets were visualized. There is a large post void residual volume. 1. Enlargement of the prostate gland causing mass effect on the urinary bladder. 2. There is a large post void residual volume. **This report has been created using voice recognition software. It may contain minor errors which are inherent in voice recognition technology. ** Final report electronically signed by Dr Sal Chery on 12/23/2022 11:38 AM        Last BUN and creatinine:  Lab Results   Component Value Date    BUN 22 10/17/2022     Lab Results   Component Value Date    CREATININE 1.1 10/17/2022           PAST MEDICAL, FAMILY AND SOCIAL HISTORY UPDATE:  Past Medical History:   Diagnosis Date    Arthritis     ASHD (arteriosclerotic heart disease)     CAD (coronary artery disease)     Status post bypass    Colon polyps 2009    colonoscopy  tubular  adenoma   rinesmith    Decreased sense of smell 05/22/2015    Epistaxis 05/22/2015    History of blood transfusion 2005    ? ???? with by pass? ??     Hyperlipidemia     Hypertension     Kidney stones 07/2022    dr Yumiko Puente    Mild carotid artery disease [facility]) 2013    Nasal obstruction 05/22/2015    Nasal septal perforation 05/22/2015     Past Surgical History:   Procedure Laterality Date    BLADDER SURGERY Right 12/20/2021    CYSTO, URETEROSCOPY, RIGHT RETROGRADE PYELOGRAM, URETERAL DILATION, WITH URETERAL BX RIGHT URETERAL STENT PLACEMENT performed by Jeri Peters MD at [address] [place] [address] Right 01/31/2022    CYSTOSCOPY RIGHT RETROGRADE PYELOGRAM, RIGHT STENT EXCHANGE performed by Jeri Peters MD at [address] [place] [address] Right 04/04/2022    CYSTOSCOPY, RIGHT STENT EXCHANGE, RIGHT RETROGRADE PYELOGRAM, ROBOTIC RIGHT URETERAL REIMPLANT performed by Jeri Peters MD at [address] [place] [address] Right 06/20/2022    Cystoscopy Right Retrograde Pyelogram Right Ureteral Stent Removal performed by Kusum De La Vega MD at [address]  2005    6 by pass    COLONOSCOPY  12/2019    Dr Ashley Boothe  colon polyps 8//2014 9-2019    COLONOSCOPY Left 09/13/2019    COLONOSCOPY POLYPECTOMY SNARE/COLD BIOPSY performed by Maira Hardy MD at [facility]  11/2005    CYSTOSCOPY N/A 10/31/2022    Cystoscopy Urolift performed by Kusum De La Vega MD at [facility]  06/05/2015    BIOPSY OF NASAL [address]  11/2022    urolift    [facility]ugh    SKIN CANCER EXCISION  2018    on back    TONSILLECTOMY      as a child    URETER SURGERY Left 07/27/2022    CYSTO, LEFT URETEROSCOPY, LASER LITHOTRIPSY, BASKET RETRIEVAL OF STONE FRAGMENTS, STENT performed by Gardenia Vásquez MD at [facility] History   Problem Relation Age of Onset    Heart Disease Mother     Cancer Father         throat    Diabetes Sister     Asthma Brother      Outpatient Medications Marked as Taking for the 12/30/22 encounter (Office Visit) with Lyndon Padron PA-C   Medication Sig Dispense Refill    tamsulosin (FLOMAX) 0.4 MG capsule Take 0.4 mg by mouth daily      losartan (COZAAR) 100 MG tablet TAKE 1 TABLET DAILY 90 tablet 2    atorvastatin (LIPITOR) 40 MG tablet TAKE 1 TABLET DAILY 90 tablet 3    metoprolol tartrate (LOPRESSOR) 25 MG tablet TAKE 1 TABLET TWICE A  tablet 3    vitamin D (ERGOCALCIFEROL) 1.25 MG (51658 UT) CAPS capsule TAKE 1 CAPSULE ONCE A WEEK 12 capsule 3    aspirin 81 MG EC tablet Take 81 mg by mouth 2 times daily       acetaminophen (TYLENOL) 650 MG extended release tablet Take 650 mg by mouth every 8 hours as needed for Pain      Multiple Vitamin (MULTI-VITAMIN) TABS Take by mouth daily          Patient has no known allergies.   Social History     Tobacco Use   Smoking Status Former    Packs/day: 1.50    Years: 30.00    Pack years: 45.00    Types: Cigarettes    Quit date: 6/3/2002    Years since quittin.5   Smokeless Tobacco Never       Social History     Substance and Sexual Activity   Alcohol Use Yes    Comment: seldom       REVIEW OF SYSTEMS:  Constitutional: negative  Eyes: negative  Respiratory: negative  Cardiovascular: negative  Gastrointestinal: negative  Musculoskeletal: negative  Genitourinary: negative except for what is in HPI  Skin: negative   Neurological: negative  Hematological/Lymphatic: negative  Psychological: negative    Physical Exam:      Vitals:    22 1030   BP: 132/68     Patient is a 67 y.o. male in no acute distress and alert and oriented to person, place and time. Pulmonary: Non-labored respiration. Cardiovascular: Normal rate, regular rhythm, normal peripheral pulses. Skin: Warm and dry. Psych: Normal mood and affect. Genitourinary: Bladder non-distended and non-tender. Assessment and Plan   BPH w/ LUTS  Incomplete bladder emptying  - S/P UroLift on 10/31/22. - Symptom improvement but PVR remains elevated at 313 mL this visit. Better since starting back on the Flomax. No longer having incontinent episodes. Nocturia drastically improved. Will hold off on increasing Flomax from once daily to twice. - UA negative for infection.   - Follow-up in 3 months with PVR to see if emptying has improved. Instructed to double void in attempt to better empty. - Most recent PSA on 21 of 5.11. Checked by PCP, PSA is ordered and he has follow-up set for April with Dr. Dante Mar.       Quirino Marroquin PA-C  Urology

## 2023-01-18 NOTE — TELEPHONE ENCOUNTER
Ned Massey is requesting a refill on the following medications:  Requested Prescriptions     Pending Prescriptions Disp Refills    losartan (COZAAR) 100 MG tablet 90 tablet 1     Sig: TAKE 1 TABLET DAILY       Date of last visit: 12/7/2022  Date of next visit (if applicable):4/7/2023

## 2023-01-19 DIAGNOSIS — I25.10 CORONARY ARTERY DISEASE INVOLVING NATIVE CORONARY ARTERY OF NATIVE HEART WITHOUT ANGINA PECTORIS: ICD-10-CM

## 2023-01-19 DIAGNOSIS — I10 ESSENTIAL HYPERTENSION: ICD-10-CM

## 2023-01-19 DIAGNOSIS — E78.5 HYPERLIPIDEMIA, UNSPECIFIED HYPERLIPIDEMIA TYPE: ICD-10-CM

## 2023-01-19 RX ORDER — ATORVASTATIN CALCIUM 40 MG/1
TABLET, FILM COATED ORAL
Qty: 90 TABLET | Refills: 3 | Status: SHIPPED | OUTPATIENT
Start: 2023-01-19

## 2023-01-19 RX ORDER — LOSARTAN POTASSIUM 100 MG/1
TABLET ORAL
Qty: 90 TABLET | Refills: 1 | Status: SHIPPED | OUTPATIENT
Start: 2023-01-19

## 2023-01-19 NOTE — TELEPHONE ENCOUNTER
Modesto Llamas is requesting a refill on the following medications:  Requested Prescriptions     Pending Prescriptions Disp Refills    atorvastatin (LIPITOR) 40 MG tablet 90 tablet 3     Sig: TAKE 1 TABLET DAILY    metoprolol tartrate (LOPRESSOR) 25 MG tablet 180 tablet 3     Sig: TAKE 1 TABLET TWICE A DAY       Date of last visit: 12/7/2022  Date of next visit (if applicable):4/7/2023

## 2023-03-31 ENCOUNTER — OFFICE VISIT (OUTPATIENT)
Dept: UROLOGY | Age: 73
End: 2023-03-31

## 2023-03-31 VITALS — RESPIRATION RATE: 18 BRPM | HEIGHT: 71 IN | BODY MASS INDEX: 33.07 KG/M2 | WEIGHT: 236.2 LBS

## 2023-03-31 DIAGNOSIS — N13.8 BPH WITH OBSTRUCTION/LOWER URINARY TRACT SYMPTOMS: ICD-10-CM

## 2023-03-31 DIAGNOSIS — R33.9 URINARY RETENTION: Primary | ICD-10-CM

## 2023-03-31 DIAGNOSIS — N40.1 BPH WITH OBSTRUCTION/LOWER URINARY TRACT SYMPTOMS: ICD-10-CM

## 2023-03-31 LAB
BILIRUBIN URINE: NEGATIVE
BLOOD URINE, POC: NEGATIVE
CHARACTER, URINE: CLEAR
COLOR, URINE: YELLOW
GLUCOSE URINE: NEGATIVE MG/DL
KETONES, URINE: NEGATIVE
LEUKOCYTE CLUMPS, URINE: NEGATIVE
NITRITE, URINE: NEGATIVE
PH, URINE: 7 (ref 5–9)
POST VOID RESIDUAL (PVR): NORMAL ML
PROTEIN, URINE: NEGATIVE MG/DL
SPECIFIC GRAVITY, URINE: 1.01 (ref 1–1.03)
UROBILINOGEN, URINE: 0.2 EU/DL (ref 0–1)

## 2023-03-31 RX ORDER — HYDROCHLOROTHIAZIDE 25 MG/1
TABLET ORAL
Qty: 90 TABLET | Refills: 1 | Status: SHIPPED | OUTPATIENT
Start: 2023-03-31

## 2023-03-31 RX ORDER — TAMSULOSIN HYDROCHLORIDE 0.4 MG/1
0.4 CAPSULE ORAL DAILY
Qty: 30 CAPSULE | Refills: 5 | Status: SHIPPED | OUTPATIENT
Start: 2023-03-31

## 2023-03-31 NOTE — TELEPHONE ENCOUNTER
Date of last visit:  12/7/2022  Date of next visit:  4/7/2023    Requested Prescriptions     Pending Prescriptions Disp Refills    hydroCHLOROthiazide (HYDRODIURIL) 25 MG tablet [Pharmacy Med Name: HYDROCHLOROTHIAZIDE 25 MG TAB] 90 tablet 1     Sig: take 1/2 tablet by mouth once daily

## 2023-03-31 NOTE — PROGRESS NOTES
TECHNIQUE: Grayscale and color images were obtained of the urinary bladder. FINDINGS: Pre-void 372 mL Post void 247 mL The prostate gland is enlarged and is causing mass effect on the urinary bladder. The prostate measures 6 x 5.6 x 5.7 cm. There is no urinary bladder wall thickening. Bilateral ureteral jets were visualized. There is a large post void residual volume. 1. Enlargement of the prostate gland causing mass effect on the urinary bladder. 2. There is a large post void residual volume. **This report has been created using voice recognition software. It may contain minor errors which are inherent in voice recognition technology. ** Final report electronically signed by Dr Milagros Myers on 12/23/2022 11:38 AM        Last BUN and creatinine:  Lab Results   Component Value Date    BUN 22 10/17/2022     Lab Results   Component Value Date    CREATININE 1.1 10/17/2022           PAST MEDICAL, FAMILY AND SOCIAL HISTORY UPDATE:  Past Medical History:   Diagnosis Date    Arthritis     ASHD (arteriosclerotic heart disease)     CAD (coronary artery disease)     Status post bypass    Colon polyps 2009    colonoscopy  tubular  adenoma   rinesmith    Decreased sense of smell 05/22/2015    Epistaxis 05/22/2015    History of blood transfusion 2005    ? ???? with by pass? ??     Hyperlipidemia     Hypertension     Kidney stones 07/2022    dr Claudell Mcbride    Mild carotid artery disease [facility]) 2013    Nasal obstruction 05/22/2015    Nasal septal perforation 05/22/2015     Past Surgical History:   Procedure Laterality Date    BLADDER SURGERY Right 12/20/2021    CYSTO, URETEROSCOPY, RIGHT RETROGRADE PYELOGRAM, URETERAL DILATION, WITH URETERAL BX RIGHT URETERAL STENT PLACEMENT performed by Jazmin Barnes MD at [address] [place] [address] Right 01/31/2022    CYSTOSCOPY RIGHT RETROGRADE PYELOGRAM, RIGHT STENT EXCHANGE performed by Jazmin Barnes MD at [address] [place] [address] Right 04/04/2022    CYSTOSCOPY, RIGHT

## 2023-04-03 ENCOUNTER — NURSE ONLY (OUTPATIENT)
Dept: LAB | Age: 73
End: 2023-04-03

## 2023-04-03 DIAGNOSIS — I10 ESSENTIAL HYPERTENSION: ICD-10-CM

## 2023-04-03 DIAGNOSIS — E78.5 HYPERLIPIDEMIA, UNSPECIFIED HYPERLIPIDEMIA TYPE: ICD-10-CM

## 2023-04-03 DIAGNOSIS — N40.0 BENIGN PROSTATIC HYPERPLASIA WITHOUT LOWER URINARY TRACT SYMPTOMS: ICD-10-CM

## 2023-04-03 LAB
CHOLEST SERPL-MCNC: 104 MG/DL (ref 100–199)
HDLC SERPL-MCNC: 31 MG/DL
LDLC SERPL CALC-MCNC: 44 MG/DL
PSA SERPL-MCNC: 5.63 NG/ML (ref 0–1)
TRIGL SERPL-MCNC: 145 MG/DL (ref 0–199)
TSH SERPL DL<=0.005 MIU/L-ACNC: 2.8 UIU/ML (ref 0.4–4.2)

## 2023-04-04 ENCOUNTER — TELEPHONE (OUTPATIENT)
Dept: FAMILY MEDICINE CLINIC | Age: 73
End: 2023-04-04

## 2023-04-04 NOTE — TELEPHONE ENCOUNTER
----- Message from Charo Cartagena MD sent at 4/4/2023  5:57 AM EDT -----  Call labs as chol great  and thyroid ok but psa up slight and not sure when he see urology  keep appt here

## 2023-04-07 ENCOUNTER — OFFICE VISIT (OUTPATIENT)
Dept: FAMILY MEDICINE CLINIC | Age: 73
End: 2023-04-07

## 2023-04-07 VITALS
RESPIRATION RATE: 14 BRPM | BODY MASS INDEX: 32.55 KG/M2 | DIASTOLIC BLOOD PRESSURE: 52 MMHG | WEIGHT: 232.5 LBS | SYSTOLIC BLOOD PRESSURE: 124 MMHG | HEART RATE: 58 BPM | HEIGHT: 71 IN

## 2023-04-07 DIAGNOSIS — N20.0 KIDNEY STONES: ICD-10-CM

## 2023-04-07 DIAGNOSIS — I10 PRIMARY HYPERTENSION: Primary | ICD-10-CM

## 2023-04-07 DIAGNOSIS — I25.810 CORONARY ARTERY DISEASE INVOLVING AUTOLOGOUS VEIN CORONARY BYPASS GRAFT WITHOUT ANGINA PECTORIS: ICD-10-CM

## 2023-04-07 DIAGNOSIS — Z95.1 S/P CABG X 6: ICD-10-CM

## 2023-04-07 DIAGNOSIS — E78.5 HYPERLIPIDEMIA, UNSPECIFIED HYPERLIPIDEMIA TYPE: ICD-10-CM

## 2023-04-07 DIAGNOSIS — D12.6 ADENOMATOUS POLYP OF COLON, UNSPECIFIED PART OF COLON: ICD-10-CM

## 2023-04-07 SDOH — ECONOMIC STABILITY: FOOD INSECURITY: WITHIN THE PAST 12 MONTHS, YOU WORRIED THAT YOUR FOOD WOULD RUN OUT BEFORE YOU GOT MONEY TO BUY MORE.: NEVER TRUE

## 2023-04-07 SDOH — ECONOMIC STABILITY: FOOD INSECURITY: WITHIN THE PAST 12 MONTHS, THE FOOD YOU BOUGHT JUST DIDN'T LAST AND YOU DIDN'T HAVE MONEY TO GET MORE.: NEVER TRUE

## 2023-04-07 SDOH — ECONOMIC STABILITY: HOUSING INSECURITY
IN THE LAST 12 MONTHS, WAS THERE A TIME WHEN YOU DID NOT HAVE A STEADY PLACE TO SLEEP OR SLEPT IN A SHELTER (INCLUDING NOW)?: NO

## 2023-04-07 SDOH — ECONOMIC STABILITY: INCOME INSECURITY: HOW HARD IS IT FOR YOU TO PAY FOR THE VERY BASICS LIKE FOOD, HOUSING, MEDICAL CARE, AND HEATING?: NOT HARD AT ALL

## 2023-04-07 ASSESSMENT — ENCOUNTER SYMPTOMS
ABDOMINAL PAIN: 0
BACK PAIN: 0
TROUBLE SWALLOWING: 0
COUGH: 0
SHORTNESS OF BREATH: 0
NAUSEA: 0
CHEST TIGHTNESS: 0
CONSTIPATION: 0
BLOOD IN STOOL: 0
SORE THROAT: 0
EYE PAIN: 0

## 2023-04-07 ASSESSMENT — PATIENT HEALTH QUESTIONNAIRE - PHQ9
SUM OF ALL RESPONSES TO PHQ QUESTIONS 1-9: 0
SUM OF ALL RESPONSES TO PHQ9 QUESTIONS 1 & 2: 0
2. FEELING DOWN, DEPRESSED OR HOPELESS: 0
SUM OF ALL RESPONSES TO PHQ QUESTIONS 1-9: 0
SUM OF ALL RESPONSES TO PHQ QUESTIONS 1-9: 0
1. LITTLE INTEREST OR PLEASURE IN DOING THINGS: 0
SUM OF ALL RESPONSES TO PHQ QUESTIONS 1-9: 0

## 2023-04-07 NOTE — PROGRESS NOTES
Subjective:      Patient ID: Sintia Galvin is a 67 y.o. male. Ashd  stable    see  dr Makeda Dave and had  urolift  and  better     Kidney  stones      Hypertension  This is a chronic problem. The current episode started more than 1 year ago. The problem has been resolved since onset. The problem is controlled. Pertinent negatives include no chest pain, headaches, palpitations or shortness of breath. The current treatment provides significant improvement. There is no history of chronic renal disease. Hyperlipidemia  This is a chronic problem. The current episode started more than 1 year ago. The problem is controlled. He has no history of chronic renal disease. Pertinent negatives include no chest pain or shortness of breath. Current antihyperlipidemic treatment includes statins. The current treatment provides significant improvement of lipids. There are no compliance problems. Past Medical History:   Diagnosis Date    Arthritis     ASHD (arteriosclerotic heart disease)     CAD (coronary artery disease)     Status post bypass    Colon polyps 2009    colonoscopy  tubular  adenoma   rinesmith    Decreased sense of smell 05/22/2015    Epistaxis 05/22/2015    History of blood transfusion 2005    ? ???? with by pass? ?? Hyperlipidemia     Hypertension     Kidney stones 07/2022    dr Suyapa Benton    Mild carotid artery disease [facility]) 2013    Nasal obstruction 05/22/2015    Nasal septal perforation 05/22/2015      Review of Systems   Constitutional:  Negative for fatigue and fever. HENT:  Negative for congestion, ear pain, postnasal drip, sore throat and trouble swallowing. Eyes:  Negative for pain. Respiratory:  Negative for cough, chest tightness and shortness of breath. Cardiovascular:  Negative for chest pain, palpitations and leg swelling. Gastrointestinal:  Negative for abdominal pain, blood in stool, constipation and nausea.    Genitourinary:  Negative for difficulty urinating, frequency and

## 2023-05-23 NOTE — TELEPHONE ENCOUNTER
The pharmacy is requesting a refill of the below medication which has been pended for you:     Requested Prescriptions     Pending Prescriptions Disp Refills    losartan (COZAAR) 100 MG tablet [Pharmacy Med Name: Losartan Potassium 100 MG Oral Tablet] 90 tablet 1     Sig: TAKE 1 TABLET BY MOUTH DAILY       Last Appointment Date: 4/7/2023  Next Appointment Date: 8/9/2023    No Known Allergies

## 2023-05-24 RX ORDER — LOSARTAN POTASSIUM 100 MG/1
TABLET ORAL
Qty: 90 TABLET | Refills: 1 | Status: SHIPPED | OUTPATIENT
Start: 2023-05-24

## 2023-08-09 ENCOUNTER — OFFICE VISIT (OUTPATIENT)
Dept: FAMILY MEDICINE CLINIC | Age: 73
End: 2023-08-09

## 2023-08-09 VITALS
BODY MASS INDEX: 32.25 KG/M2 | DIASTOLIC BLOOD PRESSURE: 60 MMHG | RESPIRATION RATE: 16 BRPM | WEIGHT: 230.38 LBS | HEART RATE: 64 BPM | HEIGHT: 71 IN | SYSTOLIC BLOOD PRESSURE: 122 MMHG

## 2023-08-09 DIAGNOSIS — I25.810 CORONARY ARTERY DISEASE INVOLVING AUTOLOGOUS VEIN CORONARY BYPASS GRAFT WITHOUT ANGINA PECTORIS: ICD-10-CM

## 2023-08-09 DIAGNOSIS — I10 PRIMARY HYPERTENSION: Primary | ICD-10-CM

## 2023-08-09 DIAGNOSIS — E55.9 VITAMIN D DEFICIENCY: ICD-10-CM

## 2023-08-09 DIAGNOSIS — E78.5 HYPERLIPIDEMIA, UNSPECIFIED HYPERLIPIDEMIA TYPE: ICD-10-CM

## 2023-08-09 DIAGNOSIS — I25.810 CORONARY ARTERY DISEASE INVOLVING CORONARY BYPASS GRAFT OF NATIVE HEART WITHOUT ANGINA PECTORIS: ICD-10-CM

## 2023-08-09 DIAGNOSIS — N20.0 KIDNEY STONES: ICD-10-CM

## 2023-08-09 ASSESSMENT — ENCOUNTER SYMPTOMS
BACK PAIN: 0
CONSTIPATION: 0
BLOOD IN STOOL: 0
SORE THROAT: 0
CHEST TIGHTNESS: 0
ABDOMINAL PAIN: 0
COUGH: 0
NAUSEA: 0
EYE PAIN: 0
TROUBLE SWALLOWING: 0
SHORTNESS OF BREATH: 0

## 2023-08-09 NOTE — PROGRESS NOTES
Subjective:      Patient ID: Manuela Zhong is a 75 y.o. male. Ashd    stable         Right   ureteral  stricture  stable          Hypertension  This is a chronic problem. The current episode started more than 1 year ago. The problem has been resolved since onset. The problem is controlled. Pertinent negatives include no anxiety, chest pain, headaches, malaise/fatigue, palpitations, peripheral edema or shortness of breath. The current treatment provides significant improvement. There are no compliance problems. Past Medical History:   Diagnosis Date    Arthritis     ASHD (arteriosclerotic heart disease)     CAD (coronary artery disease)     Status post bypass    Colon polyps 2009    colonoscopy  tubular  adenoma   rinesmith    Decreased sense of smell 05/22/2015    Epistaxis 05/22/2015    History of blood transfusion 2005    ? ???? with by pass? ?? Hyperlipidemia     Hypertension     Kidney stones 07/2022    dr Debra Hernandez    Mild carotid artery disease [facility]) 2013    Nasal obstruction 05/22/2015    Nasal septal perforation 05/22/2015      Review of Systems   Constitutional:  Negative for fatigue, fever and malaise/fatigue. HENT:  Negative for congestion, ear pain, postnasal drip, sore throat and trouble swallowing. Eyes:  Negative for pain. Respiratory:  Negative for cough, chest tightness and shortness of breath. Cardiovascular:  Negative for chest pain, palpitations and leg swelling. Gastrointestinal:  Negative for abdominal pain, blood in stool, constipation and nausea. Genitourinary:  Negative for difficulty urinating, frequency and urgency. Musculoskeletal:  Negative for arthralgias, back pain, joint swelling and neck stiffness. Skin:  Negative for rash. Neurological:  Negative for dizziness, weakness and headaches. Hematological:  Negative for adenopathy. Does not bruise/bleed easily. Psychiatric/Behavioral:  Negative for behavioral problems, dysphoric mood and sleep disturbance.

## 2023-09-13 ENCOUNTER — NURSE ONLY (OUTPATIENT)
Dept: LAB | Age: 73
End: 2023-09-13

## 2023-09-13 DIAGNOSIS — E55.9 VITAMIN D DEFICIENCY: ICD-10-CM

## 2023-09-13 DIAGNOSIS — I10 PRIMARY HYPERTENSION: ICD-10-CM

## 2023-09-13 LAB
25(OH)D3 SERPL-MCNC: 42 NG/ML (ref 30–100)
ALBUMIN SERPL BCG-MCNC: 3.9 G/DL (ref 3.5–5.1)
ALP SERPL-CCNC: 163 U/L (ref 38–126)
ALT SERPL W/O P-5'-P-CCNC: 23 U/L (ref 11–66)
ANION GAP SERPL CALC-SCNC: 10 MEQ/L (ref 8–16)
AST SERPL-CCNC: 26 U/L (ref 5–40)
BASOPHILS ABSOLUTE: 0 THOU/MM3 (ref 0–0.1)
BASOPHILS NFR BLD AUTO: 0.4 %
BILIRUB SERPL-MCNC: 0.5 MG/DL (ref 0.3–1.2)
BUN SERPL-MCNC: 15 MG/DL (ref 7–22)
CALCIUM SERPL-MCNC: 9.3 MG/DL (ref 8.5–10.5)
CHLORIDE SERPL-SCNC: 102 MEQ/L (ref 98–111)
CO2 SERPL-SCNC: 26 MEQ/L (ref 23–33)
CREAT SERPL-MCNC: 0.9 MG/DL (ref 0.4–1.2)
DEPRECATED RDW RBC AUTO: 48.2 FL (ref 35–45)
EOSINOPHIL NFR BLD AUTO: 3.4 %
EOSINOPHILS ABSOLUTE: 0.3 THOU/MM3 (ref 0–0.4)
ERYTHROCYTE [DISTWIDTH] IN BLOOD BY AUTOMATED COUNT: 13.5 % (ref 11.5–14.5)
GFR SERPL CREATININE-BSD FRML MDRD: > 60 ML/MIN/1.73M2
GLUCOSE SERPL-MCNC: 101 MG/DL (ref 70–108)
HCT VFR BLD AUTO: 44.8 % (ref 42–52)
HGB BLD-MCNC: 14.8 GM/DL (ref 14–18)
IMM GRANULOCYTES # BLD AUTO: 0.02 THOU/MM3 (ref 0–0.07)
IMM GRANULOCYTES NFR BLD AUTO: 0.2 %
LYMPHOCYTES ABSOLUTE: 2.9 THOU/MM3 (ref 1–4.8)
LYMPHOCYTES NFR BLD AUTO: 34.5 %
MCH RBC QN AUTO: 32.1 PG (ref 26–33)
MCHC RBC AUTO-ENTMCNC: 33 GM/DL (ref 32.2–35.5)
MCV RBC AUTO: 97.2 FL (ref 80–94)
MONOCYTES ABSOLUTE: 0.9 THOU/MM3 (ref 0.4–1.3)
MONOCYTES NFR BLD AUTO: 10.4 %
NEUTROPHILS NFR BLD AUTO: 51.1 %
NRBC BLD AUTO-RTO: 0 /100 WBC
PLATELET # BLD AUTO: 169 THOU/MM3 (ref 130–400)
PMV BLD AUTO: 10.7 FL (ref 9.4–12.4)
POTASSIUM SERPL-SCNC: 4.4 MEQ/L (ref 3.5–5.2)
PROT SERPL-MCNC: 7.3 G/DL (ref 6.1–8)
RBC # BLD AUTO: 4.61 MILL/MM3 (ref 4.7–6.1)
SEGMENTED NEUTROPHILS ABSOLUTE COUNT: 4.2 THOU/MM3 (ref 1.8–7.7)
SODIUM SERPL-SCNC: 138 MEQ/L (ref 135–145)
WBC # BLD AUTO: 8.3 THOU/MM3 (ref 4.8–10.8)

## 2023-09-14 ENCOUNTER — TELEPHONE (OUTPATIENT)
Dept: FAMILY MEDICINE CLINIC | Age: 73
End: 2023-09-14

## 2023-09-14 NOTE — TELEPHONE ENCOUNTER
----- Message from Riccardo Reyes MD sent at 9/14/2023 12:40 AM EDT -----  Call a labs all good and keep appt

## 2023-09-22 ENCOUNTER — OFFICE VISIT (OUTPATIENT)
Dept: UROLOGY | Age: 73
End: 2023-09-22

## 2023-09-22 ENCOUNTER — TELEPHONE (OUTPATIENT)
Dept: UROLOGY | Age: 73
End: 2023-09-22

## 2023-09-22 VITALS
SYSTOLIC BLOOD PRESSURE: 138 MMHG | DIASTOLIC BLOOD PRESSURE: 72 MMHG | BODY MASS INDEX: 32.48 KG/M2 | HEIGHT: 71 IN | WEIGHT: 232 LBS

## 2023-09-22 DIAGNOSIS — R39.12 BENIGN PROSTATIC HYPERPLASIA WITH WEAK URINARY STREAM: Primary | ICD-10-CM

## 2023-09-22 DIAGNOSIS — R97.20 INCREASED PROSTATE SPECIFIC ANTIGEN (PSA) VELOCITY: ICD-10-CM

## 2023-09-22 DIAGNOSIS — R97.20 RISING PSA LEVEL: ICD-10-CM

## 2023-09-22 DIAGNOSIS — N40.1 BENIGN PROSTATIC HYPERPLASIA WITH WEAK URINARY STREAM: Primary | ICD-10-CM

## 2023-09-22 LAB
BILIRUBIN URINE: NEGATIVE
BLOOD URINE, POC: NORMAL
CHARACTER, URINE: CLEAR
COLOR, URINE: YELLOW
GLUCOSE URINE: NEGATIVE MG/DL
KETONES, URINE: NEGATIVE
LEUKOCYTE CLUMPS, URINE: NEGATIVE
NITRITE, URINE: NEGATIVE
PH, URINE: 7.5 (ref 5–9)
POST VOID RESIDUAL (PVR): 276 ML
PROTEIN, URINE: NEGATIVE MG/DL
SPECIFIC GRAVITY, URINE: 1.02 (ref 1–1.03)
UROBILINOGEN, URINE: 0.2 EU/DL (ref 0–1)

## 2023-09-22 NOTE — TELEPHONE ENCOUNTER
Patient scheduled for MRI PROSTATE W WO  at [facility] MR on 10/23/2023. Arrival of 1PM for a 130PM  scan time.   Order  with instructions  given to the patient in the office

## 2023-09-22 NOTE — PROGRESS NOTES
2009    colonoscopy  tubular  adenoma   rinesmitmigel    Decreased sense of smell 05/22/2015    Epistaxis 05/22/2015    History of blood transfusion 2005    ? ???? with by pass? ??     Hyperlipidemia     Hypertension     Kidney stones 07/2022    dr Chrissy Falcon    Mild carotid artery disease [facility]) 2013    Nasal obstruction 05/22/2015    Nasal septal perforation 05/22/2015     Past Surgical History:   Procedure Laterality Date    BLADDER SURGERY Right 12/20/2021    CYSTO, URETEROSCOPY, RIGHT RETROGRADE PYELOGRAM, URETERAL DILATION, WITH URETERAL BX RIGHT URETERAL STENT PLACEMENT performed by Kathleen Worrell MD at [facility] Right 01/31/2022    CYSTOSCOPY RIGHT RETROGRADE PYELOGRAM, RIGHT STENT EXCHANGE performed by Kathleen Worrell MD at [facility] Right 04/04/2022    CYSTOSCOPY, RIGHT STENT EXCHANGE, RIGHT RETROGRADE PYELOGRAM, ROBOTIC RIGHT URETERAL REIMPLANT performed by Kathleen Worrell MD at [facility] Right 06/20/2022    Cystoscopy Right Retrograde Pyelogram Right Ureteral Stent Removal performed by Kathleen Worrell MD at [facility]  2005    6 by pass    COLONOSCOPY  12/2019    Dr Lilly Bailey  colon polyps 8//2014 9-2019    COLONOSCOPY Left 09/13/2019    COLONOSCOPY POLYPECTOMY SNARE/COLD BIOPSY performed by Nicole Packer MD at [address] [place] [address] GRAFT  11/2005    CYSTOSCOPY N/A 10/31/2022    Cystoscopy Urolift performed by Kathleen Worrell MD at [address]  06/05/2015    BIOPSY OF NASAL [place]  11/2022    urolift    [facility]    SKIN CANCER EXCISION  2018    on back    TONSILLECTOMY      as a child    URETER SURGERY Left 07/27/2022    CYSTO, LEFT URETEROSCOPY, LASER LITHOTRIPSY, BASKET RETRIEVAL OF STONE FRAGMENTS, STENT performed by Dixie Marinelli MD at [address] History   Problem Relation Age

## 2023-10-23 ENCOUNTER — HOSPITAL ENCOUNTER (OUTPATIENT)
Dept: MRI IMAGING | Age: 73
Discharge: HOME | End: 2023-10-23
Payer: MEDICARE

## 2023-10-23 DIAGNOSIS — R97.20 INCREASED PROSTATE SPECIFIC ANTIGEN (PSA) VELOCITY: ICD-10-CM

## 2023-10-23 DIAGNOSIS — R97.20 RISING PSA LEVEL: ICD-10-CM

## 2023-10-23 PROCEDURE — 6360000004 HC RX CONTRAST MEDICATION

## 2023-10-23 PROCEDURE — A9579 GAD-BASE MR CONTRAST NOS,1ML: HCPCS

## 2023-10-23 PROCEDURE — 76377 3D RENDER W/INTRP POSTPROCES: CPT

## 2023-10-23 RX ADMIN — GADOTERIDOL 20 ML: 279.3 INJECTION, SOLUTION INTRAVENOUS at 14:23

## 2023-11-01 ENCOUNTER — OFFICE VISIT (OUTPATIENT)
Dept: UROLOGY | Age: 73
End: 2023-11-01
Payer: MEDICARE

## 2023-11-01 ENCOUNTER — TELEPHONE (OUTPATIENT)
Dept: UROLOGY | Age: 73
End: 2023-11-01

## 2023-11-01 VITALS — WEIGHT: 225 LBS | RESPIRATION RATE: 16 BRPM | HEIGHT: 71 IN | BODY MASS INDEX: 31.5 KG/M2

## 2023-11-01 DIAGNOSIS — N40.1 BENIGN PROSTATIC HYPERPLASIA WITH WEAK URINARY STREAM: Primary | ICD-10-CM

## 2023-11-01 DIAGNOSIS — R39.12 BENIGN PROSTATIC HYPERPLASIA WITH WEAK URINARY STREAM: Primary | ICD-10-CM

## 2023-11-01 DIAGNOSIS — R97.20 RISING PSA LEVEL: ICD-10-CM

## 2023-11-01 DIAGNOSIS — R97.20 ELEVATED PSA: ICD-10-CM

## 2023-11-01 PROCEDURE — 1123F ACP DISCUSS/DSCN MKR DOCD: CPT

## 2023-11-01 PROCEDURE — 99214 OFFICE O/P EST MOD 30 MIN: CPT

## 2023-11-01 PROCEDURE — G8427 DOCREV CUR MEDS BY ELIG CLIN: HCPCS

## 2023-11-01 PROCEDURE — 1036F TOBACCO NON-USER: CPT

## 2023-11-01 PROCEDURE — G8417 CALC BMI ABV UP PARAM F/U: HCPCS

## 2023-11-01 PROCEDURE — G8484 FLU IMMUNIZE NO ADMIN: HCPCS

## 2023-11-01 PROCEDURE — 3017F COLORECTAL CA SCREEN DOC REV: CPT

## 2023-11-01 RX ORDER — CIPROFLOXACIN 500 MG/1
500 TABLET, FILM COATED ORAL 2 TIMES DAILY
Qty: 6 TABLET | Refills: 0 | Status: SHIPPED | OUTPATIENT
Start: 2023-11-01 | End: 2023-11-04

## 2023-11-01 NOTE — PROGRESS NOTES
[address]  [facility] [address]  Dept: [phone]  Loc: [phone]  Visit Date: 11/1/2023    KEVIN Lopez is a 75 y.o. male that presents to the urology clinic for BPH, urinary retention and nephrolithiasis. Rising PSA. No previous prostate biopsy in the past.  No family history of prostate cancer. S/P UroLift on 10/31/22 with Dr. Javier York. Restarted on Flomax following relapse in symptoms. Nicholas Cables that that his urinary frequency is still present- however, only during the daytime. Patient admits to drinking a large volume of fluids throughout the day which he cuts off several hours before bedtime. Additional Urology History  Patient has history of Right Ureteral Stricture, Right Ureteral Re-Implant on 4/4/22. Also has history of nephrolithiasis, noted to have obstructing 7 mm left ureteral calculus and hydronephrosis on CT Abd Pelvis from 7/26/22, showed resolution on repeat CT from 8/5/22. PVR:  276 mL    PSA Trend   5.63   (04/03/23)  5.11   (12/17/21)    UA: Negative.   Lab Results   Component Value Date/Time    APPEARANCE CLEAR 06/09/2020 08:11 AM    COLORU Yellow 09/22/2023 12:24 PM    COLORU YELLOW 07/26/2022 10:40 AM    LABSPEC 1.025 09/22/2023 12:24 PM    LABPH 7.50 09/22/2023 12:24 PM    NITRU Negative 09/22/2023 12:24 PM    GLUCOSEU Negative 09/22/2023 12:24 PM    KETUA Negative 09/22/2023 12:24 PM    UROBILINOGEN 0.20 09/22/2023 12:24 PM    BILIRUBINUR Negative 09/22/2023 12:24 PM    OCBU Negative 06/09/2020 08:11 AM            Last BUN and creatinine:  Lab Results   Component Value Date    BUN 15 09/13/2023     Lab Results   Component Value Date    CREATININE 0.9 09/13/2023           PAST MEDICAL, FAMILY AND SOCIAL HISTORY UPDATE:  Past Medical History:   Diagnosis Date    Arthritis     ASHD (arteriosclerotic heart disease)     CAD (coronary artery disease)     Status post bypass    Colon polyps

## 2023-11-01 NOTE — TELEPHONE ENCOUNTER
AFTER THE BIOPSY. YOU MAY TAKE TYLENOL IF NEEDED  2. Take your antibiotics as directed:  Cipro 500 mg twice a day, start on:   11/12/23    take until finished. 3.  Office to provide the Tobramycin an injectable antibiotic the day of the procedure . You will be given the injection once you are brought back to the room  4. Do a Fleets Enema 2 hours before the visit. Purchase it at any drug store and follow the instructions on the package. 5.  It is HIGHLY recommended to bring a  with you the day of the biopsy to transport you home. HOME GOING AND FOLLOW UP INSTRUCTIONS  Call the doctor if: 1. There is a large amount of blood or clots in the urine or stool. 2.  You are unable to urinate. 3.  If your temperature is 101 degrees F or greater. 4.  You could see blood in your semen for up to 2-months            5.   You may resume your regular medication 3-days after procedure    DATE: 11/1/2023       SIGNATURE:________________________________

## 2023-11-02 ENCOUNTER — OFFICE VISIT (OUTPATIENT)
Dept: CARDIOLOGY CLINIC | Age: 73
End: 2023-11-02
Payer: MEDICARE

## 2023-11-02 VITALS
WEIGHT: 234 LBS | BODY MASS INDEX: 32.76 KG/M2 | HEART RATE: 63 BPM | HEIGHT: 71 IN | DIASTOLIC BLOOD PRESSURE: 98 MMHG | SYSTOLIC BLOOD PRESSURE: 146 MMHG

## 2023-11-02 DIAGNOSIS — I25.810 CORONARY ARTERY DISEASE INVOLVING CORONARY BYPASS GRAFT OF NATIVE HEART WITHOUT ANGINA PECTORIS: Primary | ICD-10-CM

## 2023-11-02 DIAGNOSIS — I10 PRIMARY HYPERTENSION: ICD-10-CM

## 2023-11-02 DIAGNOSIS — R06.02 SOB (SHORTNESS OF BREATH): ICD-10-CM

## 2023-11-02 PROCEDURE — 1036F TOBACCO NON-USER: CPT | Performed by: NUCLEAR MEDICINE

## 2023-11-02 PROCEDURE — 3077F SYST BP >= 140 MM HG: CPT | Performed by: NUCLEAR MEDICINE

## 2023-11-02 PROCEDURE — 99213 OFFICE O/P EST LOW 20 MIN: CPT | Performed by: NUCLEAR MEDICINE

## 2023-11-02 PROCEDURE — 93000 ELECTROCARDIOGRAM COMPLETE: CPT | Performed by: NUCLEAR MEDICINE

## 2023-11-02 PROCEDURE — G8417 CALC BMI ABV UP PARAM F/U: HCPCS | Performed by: NUCLEAR MEDICINE

## 2023-11-02 PROCEDURE — 3080F DIAST BP >= 90 MM HG: CPT | Performed by: NUCLEAR MEDICINE

## 2023-11-02 PROCEDURE — 3017F COLORECTAL CA SCREEN DOC REV: CPT | Performed by: NUCLEAR MEDICINE

## 2023-11-02 PROCEDURE — G8428 CUR MEDS NOT DOCUMENT: HCPCS | Performed by: NUCLEAR MEDICINE

## 2023-11-02 PROCEDURE — G8484 FLU IMMUNIZE NO ADMIN: HCPCS | Performed by: NUCLEAR MEDICINE

## 2023-11-02 PROCEDURE — 1123F ACP DISCUSS/DSCN MKR DOCD: CPT | Performed by: NUCLEAR MEDICINE

## 2023-11-09 RX ORDER — LOSARTAN POTASSIUM 100 MG/1
TABLET ORAL
Qty: 90 TABLET | Refills: 1 | Status: SHIPPED | OUTPATIENT
Start: 2023-11-09

## 2023-11-09 NOTE — TELEPHONE ENCOUNTER
Date of last visit:  8/9/2023  Date of next visit:  12/14/2023    Requested Prescriptions     Pending Prescriptions Disp Refills    losartan (COZAAR) 100 MG tablet [Pharmacy Med Name: Losartan Potassium 100 MG Oral Tablet] 90 tablet 3     Sig: TAKE 1 TABLET BY MOUTH DAILY

## 2023-11-13 ENCOUNTER — PROCEDURE VISIT (OUTPATIENT)
Dept: UROLOGY | Age: 73
End: 2023-11-13

## 2023-11-13 VITALS
WEIGHT: 229.4 LBS | BODY MASS INDEX: 32.11 KG/M2 | DIASTOLIC BLOOD PRESSURE: 64 MMHG | HEIGHT: 71 IN | SYSTOLIC BLOOD PRESSURE: 122 MMHG

## 2023-11-13 DIAGNOSIS — R97.20 ELEVATED PSA: Primary | ICD-10-CM

## 2023-11-13 RX ORDER — GENTAMICIN SULFATE 40 MG/ML
80 INJECTION, SOLUTION INTRAMUSCULAR; INTRAVENOUS ONCE
Status: COMPLETED | OUTPATIENT
Start: 2023-11-13 | End: 2023-11-13

## 2023-11-13 RX ADMIN — GENTAMICIN SULFATE 80 MG: 40 INJECTION, SOLUTION INTRAMUSCULAR; INTRAVENOUS at 09:39

## 2023-11-13 NOTE — PROGRESS NOTES
Procedure: Trus/Biopsy  Pt name and birth date verified Yes  Patient agrees Dr. Ana Laura Salas is taking biopsies of the prostate Yes  Patient took Enema 2 hours prior to procedure Yes  Patient took 2 pill(s) of Cipro day before procedure, day of, and will the day after Yes  Has patient eaten today? Yes  Patient stopped all blood thinners prior to surgery Yes     Patient has given me verbal consent to perform Gentamicin Injection  Yes    Following Dr. Weiss [facility] of care. Gentamicin 80MG/2ML GIVEN I.M right UOQ HIP  Lot Number: [id number]  Expiration Date: 07/2024 [address]  #: [id number]    Gentamicin supplied by office.

## 2023-11-13 NOTE — PROGRESS NOTES
Mr. Yusra Levin was seen in follow up for his prostate biopsy. The biopsy was indicated and is being performed for elevate dPSA and MRI. The biopsy is being done with MRI / Ultrasound fusion using the UroNav system. The biopsy was done without difficulty or complication. TRUS prostate biopsy note:  After obtaining informed consent, the rectal ultrasound probe was passed per rectum and the prostate visualized. A local block was performed by instilling 2% lidocaine at the base. Measurements were taken and the prostate measured  total volume of 71 cc. At this point, prostate biopsy was performed. Using [employer], the ultrasound and MRI images were fused and then biopsies of the lesions identified by the radiologist were taken. Three cores were taken from each of the 3 lesions seen on MRI. Two cores were then  taken at the base, the mid-portion, and the apex of the gland on either side for a total of 12 cores. The rectal probe was removed and there was minimal bleeding. Mr. Yusra Levin tolerated the procedure well and there were no complications. Prostate size: 71 cc  Prostatic calcifications:yes Location: left   Hypoechoic areas: yes Location: right  Cores taken:2 + 3 cores each from targeted lesions making 6 targeted total cores  Complications: none    Lesion 1 location There is a 1.7 x 1.7 cm nodule along the base of the prostate gland. PI-RADS 3. Lesion 2 location There is a 1.8 x 1.4 cm nodule in the transitional zone on the right side. PI-RADS 3. Assessment:      Prostate biopsy performed without difficulty. This was done with UroNav MRI fused guidance. Plan:        I will see Norman Garcia back to discuss the pathology in 1-2 weeks. Further recommendations will be based on these results.

## 2023-11-22 ENCOUNTER — OFFICE VISIT (OUTPATIENT)
Dept: UROLOGY | Age: 73
End: 2023-11-22
Payer: MEDICARE

## 2023-11-22 VITALS
DIASTOLIC BLOOD PRESSURE: 62 MMHG | WEIGHT: 229 LBS | BODY MASS INDEX: 32.06 KG/M2 | HEIGHT: 71 IN | SYSTOLIC BLOOD PRESSURE: 132 MMHG

## 2023-11-22 DIAGNOSIS — R97.20 ELEVATED PSA: Primary | ICD-10-CM

## 2023-11-22 DIAGNOSIS — R39.12 BENIGN PROSTATIC HYPERPLASIA WITH WEAK URINARY STREAM: ICD-10-CM

## 2023-11-22 DIAGNOSIS — N40.1 BENIGN PROSTATIC HYPERPLASIA WITH WEAK URINARY STREAM: ICD-10-CM

## 2023-11-22 DIAGNOSIS — R97.20 RISING PSA LEVEL: ICD-10-CM

## 2023-11-22 LAB
BILIRUBIN URINE: NEGATIVE
BLOOD URINE, POC: ABNORMAL
CHARACTER, URINE: CLEAR
COLOR, URINE: YELLOW
GLUCOSE URINE: NEGATIVE MG/DL
KETONES, URINE: NEGATIVE
LEUKOCYTE CLUMPS, URINE: NEGATIVE
NITRITE, URINE: NEGATIVE
PH, URINE: 7 (ref 5–9)
PROTEIN, URINE: NEGATIVE MG/DL
SPECIFIC GRAVITY, URINE: 1.02 (ref 1–1.03)
UROBILINOGEN, URINE: 0.2 EU/DL (ref 0–1)

## 2023-11-22 PROCEDURE — 3078F DIAST BP <80 MM HG: CPT

## 2023-11-22 PROCEDURE — G8484 FLU IMMUNIZE NO ADMIN: HCPCS

## 2023-11-22 PROCEDURE — 3074F SYST BP LT 130 MM HG: CPT

## 2023-11-22 PROCEDURE — 1036F TOBACCO NON-USER: CPT

## 2023-11-22 PROCEDURE — 3017F COLORECTAL CA SCREEN DOC REV: CPT

## 2023-11-22 PROCEDURE — G8417 CALC BMI ABV UP PARAM F/U: HCPCS

## 2023-11-22 PROCEDURE — 1123F ACP DISCUSS/DSCN MKR DOCD: CPT

## 2023-11-22 PROCEDURE — G8427 DOCREV CUR MEDS BY ELIG CLIN: HCPCS

## 2023-11-22 PROCEDURE — 99213 OFFICE O/P EST LOW 20 MIN: CPT

## 2023-11-22 RX ORDER — TAMSULOSIN HYDROCHLORIDE 0.4 MG/1
0.4 CAPSULE ORAL DAILY
Qty: 90 CAPSULE | Refills: 3 | Status: SHIPPED | OUTPATIENT
Start: 2023-11-22

## 2023-11-22 NOTE — PROGRESS NOTES
[address] [address]  Dept: [phone]  Loc: [phone]  Visit Date: 11/22/2023    KEVIN Ceron is a 75 y.o. male that presents to the urology clinic for BPH, urinary retention and nephrolithiasis. Patient presenting today to review prostate biopsy results- NEGATIVE for malignancy. PSA had been rising and patient had not previously had a biopsy. No family history of prostate cancer. S/P UroLift on 10/31/22 with Dr. Vaughn Herbert. Restarted on Flomax following relapse in symptoms. Jeff Gell that that his urinary frequency is still present- however, only during the daytime. Patient admits to drinking a large volume of fluids throughout the day which he cuts off several hours before bedtime. Additional Urology History  Patient has history of Right Ureteral Stricture, Right Ureteral Re-Implant on 4/4/22. Also has history of nephrolithiasis, noted to have obstructing 7 mm left ureteral calculus and hydronephrosis on CT Abd Pelvis from 7/26/22, showed resolution on repeat CT from 8/5/22. PSA Trend   5.63   (04/03/23)  5.11   (12/17/21)    UA: Negative.   Lab Results   Component Value Date/Time    APPEARANCE CLEAR 06/09/2020 08:11 AM    COLORU Yellow 11/22/2023 03:31 PM    COLORU YELLOW 07/26/2022 10:40 AM    LABSPEC 1.020 11/22/2023 03:31 PM    LABPH 7.00 11/22/2023 03:31 PM    NITRU Negative 11/22/2023 03:31 PM    GLUCOSEU Negative 11/22/2023 03:31 PM    KETUA Negative 11/22/2023 03:31 PM    UROBILINOGEN 0.20 11/22/2023 03:31 PM    BILIRUBINUR Negative 11/22/2023 03:31 PM    OCBU Negative 06/09/2020 08:11 AM            Last BUN and creatinine:  Lab Results   Component Value Date    BUN 15 09/13/2023     Lab Results   Component Value Date    CREATININE 0.9 09/13/2023           PAST MEDICAL, FAMILY AND SOCIAL HISTORY UPDATE:  Past Medical History:   Diagnosis Date    Arthritis     ASHD (arteriosclerotic

## 2023-11-28 DIAGNOSIS — I25.10 CORONARY ARTERY DISEASE INVOLVING NATIVE CORONARY ARTERY OF NATIVE HEART WITHOUT ANGINA PECTORIS: ICD-10-CM

## 2023-11-28 DIAGNOSIS — E78.5 HYPERLIPIDEMIA, UNSPECIFIED HYPERLIPIDEMIA TYPE: ICD-10-CM

## 2023-11-28 DIAGNOSIS — I10 ESSENTIAL HYPERTENSION: ICD-10-CM

## 2023-11-29 RX ORDER — ATORVASTATIN CALCIUM 40 MG/1
TABLET, FILM COATED ORAL
Qty: 90 TABLET | Refills: 1 | Status: SHIPPED | OUTPATIENT
Start: 2023-11-29

## 2023-11-29 NOTE — TELEPHONE ENCOUNTER
Date of last visit:  8/9/2023  Date of next visit:  12/14/2023    Requested Prescriptions     Pending Prescriptions Disp Refills    metoprolol tartrate (LOPRESSOR) 25 MG tablet [Pharmacy Med Name: Metoprolol Tartrate 25 MG Oral Tablet] 180 tablet 1     Sig: TAKE 1 TABLET BY MOUTH TWICE  DAILY    atorvastatin (LIPITOR) 40 MG tablet [Pharmacy Med Name: Atorvastatin Calcium 40 MG Oral Tablet] 90 tablet 1     Sig: TAKE 1 TABLET BY MOUTH DAILY

## 2023-12-14 ENCOUNTER — OFFICE VISIT (OUTPATIENT)
Dept: FAMILY MEDICINE CLINIC | Age: 73
End: 2023-12-14

## 2023-12-14 VITALS
SYSTOLIC BLOOD PRESSURE: 130 MMHG | HEIGHT: 71 IN | RESPIRATION RATE: 16 BRPM | DIASTOLIC BLOOD PRESSURE: 62 MMHG | WEIGHT: 230.5 LBS | BODY MASS INDEX: 32.27 KG/M2 | HEART RATE: 60 BPM

## 2023-12-14 DIAGNOSIS — E78.5 HYPERLIPIDEMIA, UNSPECIFIED HYPERLIPIDEMIA TYPE: ICD-10-CM

## 2023-12-14 DIAGNOSIS — D12.6 ADENOMATOUS POLYP OF COLON, UNSPECIFIED PART OF COLON: ICD-10-CM

## 2023-12-14 DIAGNOSIS — Z00.00 MEDICARE ANNUAL WELLNESS VISIT, SUBSEQUENT: Primary | ICD-10-CM

## 2023-12-14 DIAGNOSIS — N20.0 KIDNEY STONES: ICD-10-CM

## 2023-12-14 DIAGNOSIS — I25.810 CORONARY ARTERY DISEASE INVOLVING CORONARY BYPASS GRAFT OF NATIVE HEART WITHOUT ANGINA PECTORIS: ICD-10-CM

## 2023-12-14 DIAGNOSIS — I10 PRIMARY HYPERTENSION: ICD-10-CM

## 2023-12-14 DIAGNOSIS — Z91.89 STREPTOCOCCUS PNEUMONIAE VACCINATION INDICATED: ICD-10-CM

## 2023-12-14 ASSESSMENT — PATIENT HEALTH QUESTIONNAIRE - PHQ9
SUM OF ALL RESPONSES TO PHQ QUESTIONS 1-9: 0
SUM OF ALL RESPONSES TO PHQ QUESTIONS 1-9: 0
SUM OF ALL RESPONSES TO PHQ9 QUESTIONS 1 & 2: 0
SUM OF ALL RESPONSES TO PHQ QUESTIONS 1-9: 0
SUM OF ALL RESPONSES TO PHQ QUESTIONS 1-9: 0
2. FEELING DOWN, DEPRESSED OR HOPELESS: 0
1. LITTLE INTEREST OR PLEASURE IN DOING THINGS: 0

## 2023-12-14 ASSESSMENT — LIFESTYLE VARIABLES
HOW OFTEN DO YOU HAVE A DRINK CONTAINING ALCOHOL: MONTHLY OR LESS
HOW MANY STANDARD DRINKS CONTAINING ALCOHOL DO YOU HAVE ON A TYPICAL DAY: 1 OR 2

## 2023-12-14 NOTE — PROGRESS NOTES
Immunizations Administered       Name Date Dose Route    Pneumococcal, PCV20, PREVNAR 21, (age 6w+), IM, 0.5mL 12/14/2023 0.5 mL Intramuscular    Site: Deltoid- Left    Lot: [id number]    NDC: 3354-0525-80
sounds: Normal heart sounds. Pulmonary:      Effort: Pulmonary effort is normal.      Breath sounds: Normal breath sounds. No wheezing or rales. Abdominal:      General: Bowel sounds are normal.      Palpations: Abdomen is soft. There is no mass. Tenderness: There is no abdominal tenderness. Musculoskeletal:         General: Normal range of motion. Cervical back: Normal range of motion and neck supple. Lymphadenopathy:      Cervical: No cervical adenopathy. Skin:     General: Skin is warm and dry. Findings: No rash. Neurological:      Mental Status: He is alert and oriented to person, place, and time. Cranial Nerves: No cranial nerve deficit. Deep Tendon Reflexes: Reflexes are normal and symmetric. An electronic signature was used to authenticate this note.     --Elsie Montalvo MD

## 2024-02-08 NOTE — PROGRESS NOTES
Naina Champagne MD   Urology Clinic follow-up      Patient:  Yoli Beach  YOB: 1950  Date: 8/19/2021    HISTORY OF PRESENT ILLNESS:   The patient is a 66 y.o. male who presents today for evaluation of the following problem(s):      1. Elevated PSA    2. Nephrolithiasis    3. BPH with obstruction/lower urinary tract symptoms           Overall the problem(s) : improvemement  Associated Symptoms: No dysuria, gross hematuria. Pain Severity:      Summary of old records: seen Urologist in past for big prostate  (Patient's old records, notes and chart reviewed and summarized above.)      Onset many years  Severity is described as moderate to severe. The course of symptoms over time is chronic. Alleviating factors: none  Worsening factors: none  Lower urinary tract symptoms: nocturia, 1-2 times per night, unchanged  Remote hx of stones, passed on own  No Fhx of prostate cancer  BPH: stable on no meds  PSA 7/2020  PSA, Ultrasensitive  3.81  0.00 - 4.00 ng/mL      8/4/2021:   PSA, Ultrasensitive 4. 13High   0.00 - 4.00 ng/mL Final 08/04/2021  9:05 AM        I independently reviewed and verified the images and reports from:    XR ABDOMEN (KUB) (SINGLE AP VIEW)    Result Date: 8/10/2021  PROCEDURE: XR ABDOMEN (KUB) (SINGLE AP VIEW) CLINICAL INFORMATION: 71-year-old male with a history of nephrolithiasis. COMPARISON: No prior study. TECHNIQUE: A supine AP view of the abdomen was obtained. FINDINGS: There is an 8 mm density projecting over the left renal shadow which could represent a calculus. There is a moderate amount of retained stool in the colon. There are no distended bowel loops. There are no displaced bowel loops. There is no gross free air. Degenerative changes are in the spine. 1. There is an 8 mm density projecting over the left renal shadow which may represent a calculus. 2. Nonobstructive bowel gas pattern.  3. Moderate amount of retained stool the colon which could be due to constipation. **This report has been created using voice recognition software. It may contain minor errors which are inherent in voice recognition technology. ** Final report electronically signed by Dr Brittney Cano on 8/10/2021 12:00 PM        Last several PSA's:  Lab Results   Component Value Date    PSA 3.89 (H) 07/10/2019    PSA 4.18 (H) 05/17/2018    PSA 4.29 (H) 12/27/2016       Last total testosterone:  No results found for: TESTOSTERONE    Urinalysis today:  Results for POC orders placed in visit on 08/19/21   POCT Urinalysis No Micro (Auto)   Result Value Ref Range    Glucose, Ur Negative NEGATIVE mg/dl    Bilirubin Urine Negative     Ketones, Urine Negative NEGATIVE    Specific Gravity, Urine 1.025 1.002 - 1.030    Blood, UA POC Trace-intact NEGATIVE    pH, Urine 5.00 5.0 - 9.0    Protein, Urine Negative NEGATIVE mg/dl    Urobilinogen, Urine 0.20 0.0 - 1.0 eu/dl    Nitrite, Urine Negative NEGATIVE    Leukocyte Clumps, Urine Negative NEGATIVE    Color, Urine Yellow YELLOW-STRAW    Character, Urine Clear [id number]         Last BUN and creatinine:  Lab Results   Component Value Date    BUN 16 06/09/2020     Lab Results   Component Value Date    CREATININE 0.98 06/09/2020       Imaging Reviewed during this Office Visit:   (results were independently reviewed by physician and radiology report verified)    PAST MEDICAL, FAMILY AND SOCIAL HISTORY:  Past Medical History:   Diagnosis Date    Arthritis     ASHD (arteriosclerotic heart disease)     CAD (coronary artery disease)     Status post bypass    Colon polyps 2009      colonoscopy  tubular  adenoma   rinesmit    History of blood transfusion 2005    ? ???? with by pass? ??    Hyperlipidemia     Hypertension     Mild carotid artery disease ([facility] [address] 2013     Past Surgical History:   Procedure Laterality Date    CARDIAC SURGERY  2005    6 by pass    COLONOSCOPY  12/2019    Dr Radha Olson  colon polyps 8//2014 9-2019    COLONOSCOPY Left 09/13/2019 COLONOSCOPY POLYPECTOMY SNARE/COLD BIOPSY performed by Perla Martins MD at [address] [facility] [address]  2005    EXCISION OF AURAL MASS      NASAL POLYP SURGERY  2015    BIOPSY OF NASAL POLYP MIDDLE TURBINATE AND BIOPSY SEPTAL [address]    SKIN CANCER EXCISION  2018    on back    TONSILLECTOMY      as a child     Family History   Problem Relation Age of Onset    Heart Disease Mother     Cancer Father         throat    Diabetes Sister     Asthma Brother      Outpatient Medications Marked as Taking for the 21 encounter (Office Visit) with Karen Cee MD   Medication Sig Dispense Refill    metoprolol tartrate (LOPRESSOR) 25 MG tablet TAKE 1 TABLET TWICE A DAY 14 tablet 0    losartan (COZAAR) 50 MG tablet Take 1 tablet by mouth daily 14 tablet 0    vitamin D (ERGOCALCIFEROL) 1.25 MG (20571 UT) CAPS capsule TAKE 1 CAPSULE ONCE A WEEK 12 capsule 3    atorvastatin (LIPITOR) 40 MG tablet TAKE 1 TABLET DAILY 90 tablet 3    Ascorbic Acid (VITAMIN C) 250 MG tablet Take 250 mg by mouth daily      aspirin 81 MG EC tablet Take 81 mg by mouth 2 times daily       acetaminophen (TYLENOL ARTHRITIS PAIN) 650 MG extended release tablet Take 650 mg by mouth every 8 hours as needed for Pain      sodium chloride (OCEAN, BABY AYR) 0.65 % nasal spray 1 spray by Nasal route as needed for Congestion      Multiple Vitamin (MULTI-VITAMIN) TABS Take by mouth daily          Patient has no known allergies.   Social History     Tobacco Use   Smoking Status Former Smoker    Packs/day: 1.50    Years: 30.00    Pack years: 45.00    Types: Cigarettes    Quit date: 6/3/2002    Years since quittin.2   Smokeless Tobacco Never Used       Social History     Substance and Sexual Activity   Alcohol Use Yes    Comment: rarely       REVIEW OF SYSTEMS:  Constitutional: negative  Eyes: negative  Respiratory: negative  Cardiovascular: negative  Gastrointestinal: negative  Musculoskeletal: negative  Genitourinary: negative except for what is in HPI  Skin: negative   Neurological: negative  Hematological/Lymphatic: negative  Psychological: negative    Physical Exam:    This a 66 y.o. male   Vitals:    08/19/21 0848   Resp: 16     Constitutional: Patient in no acute distress; Neuro: alert and oriented to person place and time. Psych: Mood and affect normal.        Assessment and Plan      1. Elevated PSA    2. Nephrolithiasis    3. BPH with obstruction/lower urinary tract symptoms           Plan:       Doing well, PSA stable  KUB reviewed, shows kidney stone. BPH:  mild    Stone management: Discussed all options of stone management- Medical expulsion therapy, surgery in form of ureteroscopy, ESWL. Discussed risks, benefits, alternatives of each. Discussed complication and expectations. He would like to observe    Repeat PSA and KUB 1 year    Return in about 1 year (around 8/19/2022).              Tamiko Cavaanugh MD  [facility] calm

## 2024-04-01 NOTE — TELEPHONE ENCOUNTER
Date of last visit:  12/14/2023  Date of next visit:  4/18/2024    Requested Prescriptions     Pending Prescriptions Disp Refills    hydroCHLOROthiazide (HYDRODIURIL) 25 MG tablet [Pharmacy Med Name: HYDROCHLOROTHIAZIDE 25 MG TAB] 90 tablet 1     Sig: take 1/2 tablet by mouth once daily

## 2024-04-03 RX ORDER — HYDROCHLOROTHIAZIDE 25 MG/1
TABLET ORAL
Qty: 90 TABLET | Refills: 1 | Status: SHIPPED | OUTPATIENT
Start: 2024-04-03

## 2024-04-05 NOTE — TELEPHONE ENCOUNTER
Date of last visit:  12/14/2023  Date of next visit:  4/18/2024    Requested Prescriptions     Pending Prescriptions Disp Refills    losartan (COZAAR) 100 MG tablet [Pharmacy Med Name: Losartan Potassium 100 MG Oral Tablet] 90 tablet 1     Sig: TAKE 1 TABLET BY MOUTH DAILY

## 2024-04-06 RX ORDER — LOSARTAN POTASSIUM 100 MG/1
TABLET ORAL
Qty: 90 TABLET | Refills: 1 | Status: SHIPPED | OUTPATIENT
Start: 2024-04-06

## 2024-04-16 SDOH — ECONOMIC STABILITY: FOOD INSECURITY: WITHIN THE PAST 12 MONTHS, YOU WORRIED THAT YOUR FOOD WOULD RUN OUT BEFORE YOU GOT MONEY TO BUY MORE.: NEVER TRUE

## 2024-04-16 SDOH — ECONOMIC STABILITY: INCOME INSECURITY: HOW HARD IS IT FOR YOU TO PAY FOR THE VERY BASICS LIKE FOOD, HOUSING, MEDICAL CARE, AND HEATING?: NOT HARD AT ALL

## 2024-04-16 SDOH — ECONOMIC STABILITY: FOOD INSECURITY: WITHIN THE PAST 12 MONTHS, THE FOOD YOU BOUGHT JUST DIDN'T LAST AND YOU DIDN'T HAVE MONEY TO GET MORE.: NEVER TRUE

## 2024-04-16 SDOH — ECONOMIC STABILITY: TRANSPORTATION INSECURITY
IN THE PAST 12 MONTHS, HAS LACK OF TRANSPORTATION KEPT YOU FROM MEETINGS, WORK, OR FROM GETTING THINGS NEEDED FOR DAILY LIVING?: NO

## 2024-04-16 ASSESSMENT — PATIENT HEALTH QUESTIONNAIRE - PHQ9
SUM OF ALL RESPONSES TO PHQ QUESTIONS 1-9: 0
SUM OF ALL RESPONSES TO PHQ QUESTIONS 1-9: 0
2. FEELING DOWN, DEPRESSED OR HOPELESS: NOT AT ALL
SUM OF ALL RESPONSES TO PHQ QUESTIONS 1-9: 0
SUM OF ALL RESPONSES TO PHQ9 QUESTIONS 1 & 2: 0
SUM OF ALL RESPONSES TO PHQ9 QUESTIONS 1 & 2: 0
1. LITTLE INTEREST OR PLEASURE IN DOING THINGS: NOT AT ALL
1. LITTLE INTEREST OR PLEASURE IN DOING THINGS: NOT AT ALL
2. FEELING DOWN, DEPRESSED OR HOPELESS: NOT AT ALL
SUM OF ALL RESPONSES TO PHQ QUESTIONS 1-9: 0

## 2024-04-18 ENCOUNTER — OFFICE VISIT (OUTPATIENT)
Dept: FAMILY MEDICINE CLINIC | Age: 74
End: 2024-04-18

## 2024-04-18 VITALS
BODY MASS INDEX: 32.1 KG/M2 | RESPIRATION RATE: 16 BRPM | HEART RATE: 64 BPM | WEIGHT: 229.25 LBS | HEIGHT: 71 IN | SYSTOLIC BLOOD PRESSURE: 130 MMHG | DIASTOLIC BLOOD PRESSURE: 84 MMHG

## 2024-04-18 DIAGNOSIS — D12.6 ADENOMATOUS POLYP OF COLON, UNSPECIFIED PART OF COLON: ICD-10-CM

## 2024-04-18 DIAGNOSIS — I25.10 CORONARY ARTERY DISEASE INVOLVING NATIVE CORONARY ARTERY OF NATIVE HEART WITHOUT ANGINA PECTORIS: ICD-10-CM

## 2024-04-18 DIAGNOSIS — E78.5 HYPERLIPIDEMIA, UNSPECIFIED HYPERLIPIDEMIA TYPE: ICD-10-CM

## 2024-04-18 DIAGNOSIS — N20.0 KIDNEY STONES: ICD-10-CM

## 2024-04-18 DIAGNOSIS — N40.0 BENIGN PROSTATIC HYPERPLASIA WITHOUT LOWER URINARY TRACT SYMPTOMS: ICD-10-CM

## 2024-04-18 DIAGNOSIS — I10 ESSENTIAL HYPERTENSION: Primary | ICD-10-CM

## 2024-04-18 DIAGNOSIS — I25.810 CORONARY ARTERY DISEASE INVOLVING CORONARY BYPASS GRAFT OF NATIVE HEART WITHOUT ANGINA PECTORIS: ICD-10-CM

## 2024-04-18 RX ORDER — ATORVASTATIN CALCIUM 40 MG/1
TABLET, FILM COATED ORAL
Qty: 90 TABLET | Refills: 1 | Status: SHIPPED | OUTPATIENT
Start: 2024-04-18

## 2024-04-18 ASSESSMENT — ENCOUNTER SYMPTOMS
NAUSEA: 0
BACK PAIN: 0
ABDOMINAL PAIN: 0
CONSTIPATION: 0
COUGH: 0
BLOOD IN STOOL: 0
SORE THROAT: 0
CHEST TIGHTNESS: 0
TROUBLE SWALLOWING: 0
EYE PAIN: 0
SHORTNESS OF BREATH: 0

## 2024-04-18 NOTE — PROGRESS NOTES
Constitutional:       Appearance: He is well-developed.   HENT:      Head: Normocephalic and atraumatic.      Right Ear: External ear normal.      Left Ear: External ear normal.      Nose: Nose normal.   Eyes:      Conjunctiva/sclera: Conjunctivae normal.      Pupils: Pupils are equal, round, and reactive to light.      Comments: Fundi nl   Neck:      Thyroid: No thyromegaly.   Cardiovascular:      Rate and Rhythm: Normal rate and regular rhythm.      Heart sounds: Normal heart sounds.   Pulmonary:      Effort: Pulmonary effort is normal.      Breath sounds: Normal breath sounds. No wheezing or rales.   Abdominal:      General: Bowel sounds are normal.      Palpations: Abdomen is soft. There is no mass.      Tenderness: There is no abdominal tenderness.   Musculoskeletal:         General: Normal range of motion.      Cervical back: Normal range of motion and neck supple.   Lymphadenopathy:      Cervical: No cervical adenopathy.   Skin:     General: Skin is warm and dry.      Findings: No rash.   Neurological:      Mental Status: He is alert and oriented to person, place, and time.      Cranial Nerves: No cranial nerve deficit.      Deep Tendon Reflexes: Reflexes are normal and symmetric.                  An electronic signature was used to authenticate this note.    --Alex Cooper MD

## 2024-04-22 ENCOUNTER — NURSE ONLY (OUTPATIENT)
Dept: LAB | Age: 74
End: 2024-04-22

## 2024-04-22 DIAGNOSIS — I10 ESSENTIAL HYPERTENSION: ICD-10-CM

## 2024-04-22 DIAGNOSIS — N40.0 BENIGN PROSTATIC HYPERPLASIA WITHOUT LOWER URINARY TRACT SYMPTOMS: ICD-10-CM

## 2024-04-22 DIAGNOSIS — E78.5 HYPERLIPIDEMIA, UNSPECIFIED HYPERLIPIDEMIA TYPE: ICD-10-CM

## 2024-04-22 LAB
ANION GAP SERPL CALC-SCNC: 13 MEQ/L (ref 8–16)
BUN SERPL-MCNC: 16 MG/DL (ref 7–22)
CALCIUM SERPL-MCNC: 9 MG/DL (ref 8.5–10.5)
CHLORIDE SERPL-SCNC: 102 MEQ/L (ref 98–111)
CHOLEST SERPL-MCNC: 98 MG/DL (ref 100–199)
CO2 SERPL-SCNC: 23 MEQ/L (ref 23–33)
CREAT SERPL-MCNC: 0.9 MG/DL (ref 0.4–1.2)
GFR SERPL CREATININE-BSD FRML MDRD: 89 ML/MIN/1.73M2
GLUCOSE SERPL-MCNC: 98 MG/DL (ref 70–108)
HDLC SERPL-MCNC: 33 MG/DL
LDLC SERPL CALC-MCNC: 40 MG/DL
POTASSIUM SERPL-SCNC: 4.2 MEQ/L (ref 3.5–5.2)
PSA SERPL-MCNC: 6.39 NG/ML (ref 0–1)
SODIUM SERPL-SCNC: 138 MEQ/L (ref 135–145)
TRIGL SERPL-MCNC: 125 MG/DL (ref 0–199)
TSH SERPL DL<=0.005 MIU/L-ACNC: 2.23 UIU/ML (ref 0.4–4.2)

## 2024-04-23 ENCOUNTER — TELEPHONE (OUTPATIENT)
Dept: FAMILY MEDICINE CLINIC | Age: 74
End: 2024-04-23

## 2024-04-23 NOTE — TELEPHONE ENCOUNTER
----- Message from Alex Cooper MD sent at 4/23/2024 12:17 AM EDT -----   Chol at  98  so  good.  Kidney  function is  normal     Thyroid is  normal     Psa  elevated at 6.39  and  copy to [facility]  urology    Please call as stable

## 2024-08-21 ENCOUNTER — OFFICE VISIT (OUTPATIENT)
Dept: FAMILY MEDICINE CLINIC | Age: 74
End: 2024-08-21

## 2024-08-21 VITALS
WEIGHT: 229 LBS | SYSTOLIC BLOOD PRESSURE: 120 MMHG | HEIGHT: 71 IN | BODY MASS INDEX: 32.06 KG/M2 | HEART RATE: 76 BPM | DIASTOLIC BLOOD PRESSURE: 78 MMHG | RESPIRATION RATE: 16 BRPM

## 2024-08-21 DIAGNOSIS — E78.5 HYPERLIPIDEMIA, UNSPECIFIED HYPERLIPIDEMIA TYPE: ICD-10-CM

## 2024-08-21 DIAGNOSIS — I25.810 CORONARY ARTERY DISEASE INVOLVING CORONARY BYPASS GRAFT OF NATIVE HEART WITHOUT ANGINA PECTORIS: ICD-10-CM

## 2024-08-21 DIAGNOSIS — I10 PRIMARY HYPERTENSION: Primary | ICD-10-CM

## 2024-08-21 DIAGNOSIS — N20.0 KIDNEY STONES: ICD-10-CM

## 2024-08-21 PROCEDURE — 1123F ACP DISCUSS/DSCN MKR DOCD: CPT | Performed by: FAMILY MEDICINE

## 2024-08-21 PROCEDURE — 1036F TOBACCO NON-USER: CPT | Performed by: FAMILY MEDICINE

## 2024-08-21 PROCEDURE — 99213 OFFICE O/P EST LOW 20 MIN: CPT | Performed by: FAMILY MEDICINE

## 2024-08-21 PROCEDURE — G8417 CALC BMI ABV UP PARAM F/U: HCPCS | Performed by: FAMILY MEDICINE

## 2024-08-21 PROCEDURE — 3017F COLORECTAL CA SCREEN DOC REV: CPT | Performed by: FAMILY MEDICINE

## 2024-08-21 PROCEDURE — G8427 DOCREV CUR MEDS BY ELIG CLIN: HCPCS | Performed by: FAMILY MEDICINE

## 2024-08-21 ASSESSMENT — ENCOUNTER SYMPTOMS
ABDOMINAL PAIN: 0
CONSTIPATION: 0
CHEST TIGHTNESS: 0
EYE PAIN: 0
SHORTNESS OF BREATH: 0
TROUBLE SWALLOWING: 0
NAUSEA: 0
BACK PAIN: 0
COUGH: 0
SORE THROAT: 0
BLOOD IN STOOL: 0

## 2024-08-21 NOTE — PROGRESS NOTES
Subjective   Patient ID: Ned Massey is a 74 y.o. male.     Ashd   stable         Bph  stable       Hypertension  This is a chronic problem. The current episode started more than 1 year ago. The problem has been resolved since onset. The problem is controlled. Pertinent negatives include no chest pain, headaches, palpitations, peripheral edema or shortness of breath. The current treatment provides significant improvement. There are no compliance problems.    Hyperlipidemia  This is a chronic problem. The current episode started more than 1 year ago. The problem is controlled. Pertinent negatives include no chest pain or shortness of breath. Current antihyperlipidemic treatment includes statins. The current treatment provides significant improvement of lipids. There are no compliance problems.      Past Medical History:   Diagnosis Date    Arthritis     ASHD (arteriosclerotic heart disease)     CAD (coronary artery disease)     Status post bypass    Colon polyps 2009    colonoscopy  tubular  adenoma   rinesmith    Decreased sense of smell 05/22/2015    Epistaxis 05/22/2015    History of blood transfusion 2005    ????? with by pass???    Hyperlipidemia     Hypertension     Kidney stones 07/2022    dr artis    Mild carotid artery disease ([facility]) 2013    Nasal obstruction 05/22/2015    Nasal septal perforation 05/22/2015      Review of Systems   Constitutional:  Negative for fatigue and fever.   HENT:  Negative for congestion, ear pain, postnasal drip, sore throat and trouble swallowing.    Eyes:  Negative for pain.   Respiratory:  Negative for cough, chest tightness and shortness of breath.    Cardiovascular:  Negative for chest pain, palpitations and leg swelling.   Gastrointestinal:  Negative for abdominal pain, blood in stool, constipation and nausea.   Genitourinary:  Negative for difficulty urinating, frequency and urgency.   Musculoskeletal:  Negative for arthralgias, back pain, joint swelling and neck

## 2024-08-28 NOTE — TELEPHONE ENCOUNTER
Date of last visit:  8/21/2024  Date of next visit:  1/21/2025    Requested Prescriptions     Pending Prescriptions Disp Refills    losartan (COZAAR) 100 MG tablet [Pharmacy Med Name: Losartan Potassium 100 MG Oral Tablet] 90 tablet 1     Sig: TAKE 1 TABLET BY MOUTH DAILY

## 2024-08-29 RX ORDER — LOSARTAN POTASSIUM 100 MG/1
TABLET ORAL
Qty: 90 TABLET | Refills: 1 | Status: SHIPPED | OUTPATIENT
Start: 2024-08-29

## 2024-10-09 DIAGNOSIS — I25.10 CORONARY ARTERY DISEASE INVOLVING NATIVE CORONARY ARTERY OF NATIVE HEART WITHOUT ANGINA PECTORIS: ICD-10-CM

## 2024-10-09 DIAGNOSIS — E78.5 HYPERLIPIDEMIA, UNSPECIFIED HYPERLIPIDEMIA TYPE: ICD-10-CM

## 2024-10-09 DIAGNOSIS — I10 ESSENTIAL HYPERTENSION: ICD-10-CM

## 2024-10-10 RX ORDER — METOPROLOL TARTRATE 25 MG/1
TABLET, FILM COATED ORAL
Qty: 180 TABLET | Refills: 1 | Status: SHIPPED | OUTPATIENT
Start: 2024-10-10

## 2024-10-10 RX ORDER — ATORVASTATIN CALCIUM 40 MG/1
TABLET, FILM COATED ORAL
Qty: 90 TABLET | Refills: 1 | Status: SHIPPED | OUTPATIENT
Start: 2024-10-10

## 2024-10-10 NOTE — TELEPHONE ENCOUNTER
Date of last visit:  8/21/2024  Date of next visit:  1/21/2025    Requested Prescriptions     Pending Prescriptions Disp Refills    atorvastatin (LIPITOR) 40 MG tablet [Pharmacy Med Name: Atorvastatin Calcium 40 MG Oral Tablet] 90 tablet 1     Sig: TAKE 1 TABLET BY MOUTH DAILY    metoprolol tartrate (LOPRESSOR) 25 MG tablet [Pharmacy Med Name: Metoprolol Tartrate 25 MG Oral Tablet] 180 tablet 1     Sig: TAKE 1 TABLET BY MOUTH TWICE  DAILY

## 2024-11-04 ENCOUNTER — LAB (OUTPATIENT)
Dept: LAB | Age: 74
End: 2024-11-04

## 2024-11-04 ENCOUNTER — OFFICE VISIT (OUTPATIENT)
Dept: CARDIOLOGY CLINIC | Age: 74
End: 2024-11-04

## 2024-11-04 VITALS
HEIGHT: 71 IN | BODY MASS INDEX: 32.2 KG/M2 | SYSTOLIC BLOOD PRESSURE: 128 MMHG | DIASTOLIC BLOOD PRESSURE: 62 MMHG | HEART RATE: 74 BPM | WEIGHT: 230 LBS

## 2024-11-04 DIAGNOSIS — E78.01 FAMILIAL HYPERCHOLESTEROLEMIA: ICD-10-CM

## 2024-11-04 DIAGNOSIS — R06.02 SHORTNESS OF BREATH: ICD-10-CM

## 2024-11-04 DIAGNOSIS — R97.20 ELEVATED PSA: ICD-10-CM

## 2024-11-04 DIAGNOSIS — I10 PRIMARY HYPERTENSION: Primary | ICD-10-CM

## 2024-11-04 DIAGNOSIS — I25.810 CORONARY ARTERY DISEASE INVOLVING CORONARY BYPASS GRAFT OF NATIVE HEART WITHOUT ANGINA PECTORIS: ICD-10-CM

## 2024-11-04 LAB — PSA SERPL-MCNC: 5.68 NG/ML (ref 0–1)

## 2024-11-04 NOTE — PROGRESS NOTES
1 year follow up.    EKG done today.    Denies chest pain, palpitations, dizziness, shortness of breath, and edema.     No cardiac concerns at this time.

## 2024-11-04 NOTE — PROGRESS NOTES
[facility] [place] [facility]  [facility] CARDIOLOGY  [address]  [address]  [place] [address]  Dept: [phone]  Dept Fax: [phone]  Loc: [phone]    Visit Date: 11/4/2024    Ned Massey is a 74 y.o. male who presents todayfor:  Chief Complaint   Patient presents with    Follow-up     1 year follow up.    Hypertension    Coronary Artery Disease    Hyperlipidemia     Known CABG 2005   Some fatigue   Some dyspnea  Exertional in nature  No chest pain   Bp is stable  No dizziness  No syncope  On statins for hyperlipidemia  No issues with meds  Higher risk patient         HPI:  HPI  Past Medical History:   Diagnosis Date    Arthritis     ASHD (arteriosclerotic heart disease)     CAD (coronary artery disease)     Status post bypass    Colon polyps 2009    colonoscopy  tubular  adenoma   [facility]mith    Decreased sense of smell 05/22/2015    Epistaxis 05/22/2015    History of blood transfusion 2005    ????? with by pass???    Hyperlipidemia     Hypertension     Kidney stones 07/2022    dr artis    Mild carotid artery disease (HCC) 2013    Nasal obstruction 05/22/2015    Nasal septal perforation 05/22/2015      Past Surgical History:   Procedure Laterality Date    BLADDER SURGERY Right 12/20/2021    CYSTO, URETEROSCOPY, RIGHT RETROGRADE PYELOGRAM, URETERAL DILATION, WITH URETERAL BX RIGHT URETERAL STENT PLACEMENT performed by Jay Broussard Jr., MD at [facility] OR    BLADDER SURGERY Right 01/31/2022    CYSTOSCOPY RIGHT RETROGRADE PYELOGRAM, RIGHT STENT EXCHANGE performed by Jay Broussard Jr., MD at [facility] OR    BLADDER SURGERY Right 04/04/2022    CYSTOSCOPY, RIGHT STENT EXCHANGE, RIGHT RETROGRADE PYELOGRAM, ROBOTIC RIGHT URETERAL REIMPLANT performed by Jay Broussard Jr., MD at [facility] OR    BLADDER SURGERY Right 06/20/2022    Cystoscopy Right Retrograde Pyelogram Right Ureteral Stent Removal performed by Jay Broussard Jr., MD at [facility] OR    CARDIAC SURGERY  2005    6 by pass

## 2024-11-15 ENCOUNTER — HOSPITAL ENCOUNTER (OUTPATIENT)
Dept: NUCLEAR MEDICINE | Age: 74
Discharge: HOME | End: 2024-11-15
Attending: NUCLEAR MEDICINE
Payer: MEDICARE

## 2024-11-15 ENCOUNTER — HOSPITAL ENCOUNTER (OUTPATIENT)
Age: 74
Discharge: HOME | End: 2024-11-17
Attending: NUCLEAR MEDICINE
Payer: MEDICARE

## 2024-11-15 VITALS — BODY MASS INDEX: 31.64 KG/M2 | HEIGHT: 71 IN | WEIGHT: 226 LBS

## 2024-11-15 DIAGNOSIS — I10 PRIMARY HYPERTENSION: ICD-10-CM

## 2024-11-15 DIAGNOSIS — R06.02 SHORTNESS OF BREATH: ICD-10-CM

## 2024-11-15 DIAGNOSIS — E78.01 FAMILIAL HYPERCHOLESTEROLEMIA: ICD-10-CM

## 2024-11-15 DIAGNOSIS — I25.810 CORONARY ARTERY DISEASE INVOLVING CORONARY BYPASS GRAFT OF NATIVE HEART WITHOUT ANGINA PECTORIS: ICD-10-CM

## 2024-11-15 LAB
ECHO AO ASC DIAM: 3.4 CM
ECHO AO SINUS VALSALVA DIAM: 3.2 CM
ECHO AO ST JNCT DIAM: 2.7 CM
ECHO AV CUSP MM: 2.1 CM
ECHO AV MEAN GRADIENT: 3 MMHG
ECHO AV MEAN VELOCITY: 0.8 M/S
ECHO AV PEAK GRADIENT: 7 MMHG
ECHO AV PEAK VELOCITY: 1.4 M/S
ECHO AV VELOCITY RATIO: 0.57
ECHO AV VTI: 22.8 CM
ECHO BSA: 2.27 M2
ECHO EST RA PRESSURE: 3 MMHG
ECHO LA AREA 2C: 15.6 CM2
ECHO LA AREA 4C: 18.7 CM2
ECHO LA DIAMETER: 3.8 CM
ECHO LA MAJOR AXIS: 6 CM
ECHO LA MINOR AXIS: 5.4 CM
ECHO LA VOL BP: 45 ML (ref 18–58)
ECHO LA VOL MOD A2C: 36 ML (ref 18–58)
ECHO LA VOL MOD A4C: 50 ML (ref 18–58)
ECHO LV E' LATERAL VELOCITY: 9.2 CM/S
ECHO LV E' SEPTAL VELOCITY: 5.7 CM/S
ECHO LV EJECTION FRACTION BIPLANE: 60 % (ref 55–100)
ECHO LV FRACTIONAL SHORTENING: 34 % (ref 28–44)
ECHO LV INTERNAL DIMENSION DIASTOLIC: 4.7 CM (ref 4.2–5.9)
ECHO LV INTERNAL DIMENSION SYSTOLIC: 3.1 CM
ECHO LV ISOVOLUMETRIC RELAXATION TIME (IVRT): 88 MS
ECHO LV IVSD: 1.2 CM (ref 0.6–1)
ECHO LV MASS 2D: 212 G (ref 88–224)
ECHO LV POSTERIOR WALL DIASTOLIC: 1.2 CM (ref 0.6–1)
ECHO LV RELATIVE WALL THICKNESS RATIO: 0.51
ECHO LVOT AV VTI INDEX: 0.75
ECHO LVOT MEAN GRADIENT: 1 MMHG
ECHO LVOT PEAK GRADIENT: 3 MMHG
ECHO LVOT PEAK VELOCITY: 0.8 M/S
ECHO LVOT VTI: 17.1 CM
ECHO MV A VELOCITY: 0.66 M/S
ECHO MV E DECELERATION TIME (DT): 255 MS
ECHO MV E VELOCITY: 0.51 M/S
ECHO MV E/A RATIO: 0.77
ECHO MV E/E' LATERAL: 5.54
ECHO MV E/E' RATIO (AVERAGED): 7.25
ECHO MV E/E' SEPTAL: 8.95
ECHO PULMONARY ARTERY END DIASTOLIC PRESSURE: 8 MMHG
ECHO PV MAX VELOCITY: 0.8 M/S
ECHO PV PEAK GRADIENT: 2 MMHG
ECHO PV REGURGITANT MAX VELOCITY: 1.4 M/S
ECHO RIGHT VENTRICULAR SYSTOLIC PRESSURE (RVSP): 31 MMHG
ECHO RV FREE WALL PEAK S': 10.2 CM/S
ECHO RV INTERNAL DIMENSION: 3.6 CM
ECHO RV TAPSE: 1.6 CM (ref 1.7–?)
ECHO TV E WAVE: 0.6 M/S
ECHO TV REGURGITANT MAX VELOCITY: 2.63 M/S
ECHO TV REGURGITANT PEAK GRADIENT: 28 MMHG
NUC STRESS EJECTION FRACTION: 67 %
STRESS BASELINE DIAS BP: 67 MMHG
STRESS BASELINE HR: 59 BPM
STRESS BASELINE SYS BP: 150 MMHG
STRESS ESTIMATED WORKLOAD: 1 METS
STRESS PEAK DIAS BP: 67 MMHG
STRESS PEAK SYS BP: 150 MMHG
STRESS PERCENT HR ACHIEVED: 64 %
STRESS POST PEAK HR: 93 BPM
STRESS RATE PRESSURE PRODUCT: NORMAL BPM*MMHG
STRESS STAGE 1 BP: NORMAL MMHG
STRESS STAGE 1 DURATION: 1 MIN:SEC
STRESS STAGE 1 HR: 76 BPM
STRESS STAGE 2 BP: NORMAL MMHG
STRESS STAGE 2 DURATION: 1 MIN:SEC
STRESS STAGE 2 HR: 83 BPM
STRESS STAGE 3 BP: NORMAL MMHG
STRESS STAGE 3 DURATION: 1 MIN:SEC
STRESS STAGE 3 HR: 82 BPM
STRESS STAGE RECOVERY 1 BP: NORMAL MMHG
STRESS STAGE RECOVERY 1 DURATION: 1 MIN:SEC
STRESS STAGE RECOVERY 1 HR: 82 BPM
STRESS STAGE RECOVERY 2 BP: NORMAL MMHG
STRESS STAGE RECOVERY 2 DURATION: 1 MIN:SEC
STRESS STAGE RECOVERY 2 HR: 82 BPM
STRESS STAGE RECOVERY 3 BP: NORMAL MMHG
STRESS STAGE RECOVERY 3 DURATION: 1 MIN:SEC
STRESS STAGE RECOVERY 3 HR: 78 BPM
STRESS STAGE RECOVERY 4 BP: NORMAL MMHG
STRESS STAGE RECOVERY 4 DURATION: 1 MIN:SEC
STRESS STAGE RECOVERY 4 HR: 79 BPM
STRESS TARGET HR: 145 BPM

## 2024-11-15 PROCEDURE — 93306 TTE W/DOPPLER COMPLETE: CPT | Performed by: NUCLEAR MEDICINE

## 2024-11-15 PROCEDURE — A9500 TC99M SESTAMIBI: HCPCS | Performed by: NUCLEAR MEDICINE

## 2024-11-15 PROCEDURE — 78452 HT MUSCLE IMAGE SPECT MULT: CPT

## 2024-11-15 PROCEDURE — 93017 CV STRESS TEST TRACING ONLY: CPT

## 2024-11-15 PROCEDURE — 3430000000 HC RX DIAGNOSTIC RADIOPHARMACEUTICAL: Performed by: NUCLEAR MEDICINE

## 2024-11-15 PROCEDURE — 78452 HT MUSCLE IMAGE SPECT MULT: CPT | Performed by: NUCLEAR MEDICINE

## 2024-11-15 PROCEDURE — 93016 CV STRESS TEST SUPVJ ONLY: CPT | Performed by: NUCLEAR MEDICINE

## 2024-11-15 PROCEDURE — 93306 TTE W/DOPPLER COMPLETE: CPT

## 2024-11-15 PROCEDURE — 93018 CV STRESS TEST I&R ONLY: CPT | Performed by: NUCLEAR MEDICINE

## 2024-11-15 PROCEDURE — 6360000002 HC RX W HCPCS: Performed by: NUCLEAR MEDICINE

## 2024-11-15 RX ORDER — REGADENOSON 0.08 MG/ML
0.4 INJECTION, SOLUTION INTRAVENOUS
Status: COMPLETED | OUTPATIENT
Start: 2024-11-15 | End: 2024-11-15

## 2024-11-15 RX ORDER — TETRAKIS(2-METHOXYISOBUTYLISOCYANIDE)COPPER(I) TETRAFLUOROBORATE 1 MG/ML
34 INJECTION, POWDER, LYOPHILIZED, FOR SOLUTION INTRAVENOUS
Status: COMPLETED | OUTPATIENT
Start: 2024-11-15 | End: 2024-11-15

## 2024-11-15 RX ORDER — TETRAKIS(2-METHOXYISOBUTYLISOCYANIDE)COPPER(I) TETRAFLUOROBORATE 1 MG/ML
9.1 INJECTION, POWDER, LYOPHILIZED, FOR SOLUTION INTRAVENOUS
Status: COMPLETED | OUTPATIENT
Start: 2024-11-15 | End: 2024-11-15

## 2024-11-15 RX ADMIN — Medication 34 MILLICURIE: at 15:00

## 2024-11-15 RX ADMIN — REGADENOSON 0.4 MG: 0.08 INJECTION, SOLUTION INTRAVENOUS at 14:57

## 2024-11-15 RX ADMIN — Medication 9.1 MILLICURIE: at 14:11

## 2024-11-20 ENCOUNTER — OFFICE VISIT (OUTPATIENT)
Dept: UROLOGY | Age: 74
End: 2024-11-20
Payer: MEDICARE

## 2024-11-20 VITALS — RESPIRATION RATE: 22 BRPM | WEIGHT: 226 LBS | BODY MASS INDEX: 31.64 KG/M2 | HEIGHT: 71 IN

## 2024-11-20 DIAGNOSIS — R39.12 BENIGN PROSTATIC HYPERPLASIA WITH WEAK URINARY STREAM: ICD-10-CM

## 2024-11-20 DIAGNOSIS — R33.9 INCOMPLETE BLADDER EMPTYING: ICD-10-CM

## 2024-11-20 DIAGNOSIS — N40.1 BENIGN PROSTATIC HYPERPLASIA WITH WEAK URINARY STREAM: ICD-10-CM

## 2024-11-20 DIAGNOSIS — R97.20 ELEVATED PSA: Primary | ICD-10-CM

## 2024-11-20 PROCEDURE — 99213 OFFICE O/P EST LOW 20 MIN: CPT

## 2024-11-20 PROCEDURE — G8417 CALC BMI ABV UP PARAM F/U: HCPCS

## 2024-11-20 PROCEDURE — G8427 DOCREV CUR MEDS BY ELIG CLIN: HCPCS

## 2024-11-20 PROCEDURE — 3017F COLORECTAL CA SCREEN DOC REV: CPT

## 2024-11-20 PROCEDURE — G8484 FLU IMMUNIZE NO ADMIN: HCPCS

## 2024-11-20 PROCEDURE — 1036F TOBACCO NON-USER: CPT

## 2024-11-20 PROCEDURE — 1159F MED LIST DOCD IN RCRD: CPT

## 2024-11-20 PROCEDURE — 1123F ACP DISCUSS/DSCN MKR DOCD: CPT

## 2024-11-20 RX ORDER — TAMSULOSIN HYDROCHLORIDE 0.4 MG/1
0.4 CAPSULE ORAL DAILY
Qty: 90 CAPSULE | Refills: 3 | Status: SHIPPED | OUTPATIENT
Start: 2024-11-20

## 2024-11-20 NOTE — PROGRESS NOTES
[facility] [place] [facility]  [facility] UROLOGY  [address]  [address]  [facility] [address]  Dept: [phone]  Loc: [phone]  Visit Date: 11/20/2024    HPI  Ned Massey is a 74 y.o. male that presents to the urology clinic for BPH and elevated PSA follow-up.    Prostate Cancer Screening  Persistently elevated PSA. Prostate biopsy in November 2023 is NEGATIVE for malignancy.   No family history of prostate cancer.    BPH  S/P UroLift on 10/31/22 with Dr. Broussard. Restarted on Flomax following relapse in symptoms. Edward that that his urinary frequency is still present, however, only during the daytime. Patient admits to drinking a large volume of fluids throughout the day which he cuts off several hours before bedtime.    Additional Urology History  Patient has history of Right Ureteral Stricture, Right Ureteral Re-Implant on 4/4/22. Also has history of nephrolithiasis, noted to have obstructing 7 mm left ureteral calculus and hydronephrosis on CT Abd Pelvis from 7/26/22, not demonstrated on repeat CT from August 2022.    PSA Trend   5.68   (11/04/24)  6.39   (04/22/24)  5.63   (04/03/23)  5.11   (12/17/21)  3.89   (07/10/19)    UA: Negative.  Lab Results   Component Value Date/Time    APPEARANCE CLEAR 06/09/2020 08:11 AM    COLORU Yellow 11/22/2023 03:31 PM    COLORU YELLOW 07/26/2022 10:40 AM    NITRU Negative 11/22/2023 03:31 PM    GLUCOSEU Negative 11/22/2023 03:31 PM    KETUA Negative 11/22/2023 03:31 PM    UROBILINOGEN 0.20 11/22/2023 03:31 PM    BILIRUBINUR Negative 11/22/2023 03:31 PM    OCBU Negative 06/09/2020 08:11 AM            Last BUN and creatinine:  Lab Results   Component Value Date    BUN 16 04/22/2024     Lab Results   Component Value Date    CREATININE 0.9 04/22/2024           PAST MEDICAL, FAMILY AND SOCIAL HISTORY UPDATE:  Past Medical History:   Diagnosis Date    Arthritis     ASHD (arteriosclerotic heart disease)     CAD (coronary artery disease)

## 2024-12-16 RX ORDER — TAMSULOSIN HYDROCHLORIDE 0.4 MG/1
0.4 CAPSULE ORAL DAILY
Qty: 90 CAPSULE | Refills: 3 | Status: SHIPPED | OUTPATIENT
Start: 2024-12-16

## 2024-12-16 NOTE — TELEPHONE ENCOUNTER
Byron Melson called requesting a refill on the following medications:  Requested Prescriptions     Pending Prescriptions Disp Refills    tamsulosin (FLOMAX) 0.4 MG capsule [Pharmacy Med Name: TAMSULOSIN 0.4MG CAPSULES] 90 capsule 3     Sig: TAKE ONE CAPSULE BY MOUTH DAILY     Pharmacy verified:  .boy      Date of last visit: 11/20/2024  Date of next visit (if applicable): 11/26/2025

## 2025-01-16 SDOH — HEALTH STABILITY: PHYSICAL HEALTH: ON AVERAGE, HOW MANY DAYS PER WEEK DO YOU ENGAGE IN MODERATE TO STRENUOUS EXERCISE (LIKE A BRISK WALK)?: 3 DAYS

## 2025-01-16 SDOH — HEALTH STABILITY: PHYSICAL HEALTH: ON AVERAGE, HOW MANY MINUTES DO YOU ENGAGE IN EXERCISE AT THIS LEVEL?: 20 MIN

## 2025-01-16 ASSESSMENT — PATIENT HEALTH QUESTIONNAIRE - PHQ9
SUM OF ALL RESPONSES TO PHQ QUESTIONS 1-9: 0
SUM OF ALL RESPONSES TO PHQ9 QUESTIONS 1 & 2: 0
SUM OF ALL RESPONSES TO PHQ QUESTIONS 1-9: 0
2. FEELING DOWN, DEPRESSED OR HOPELESS: NOT AT ALL
SUM OF ALL RESPONSES TO PHQ QUESTIONS 1-9: 0
SUM OF ALL RESPONSES TO PHQ QUESTIONS 1-9: 0
1. LITTLE INTEREST OR PLEASURE IN DOING THINGS: NOT AT ALL

## 2025-01-16 ASSESSMENT — LIFESTYLE VARIABLES
HOW OFTEN DO YOU HAVE A DRINK CONTAINING ALCOHOL: 2
HOW MANY STANDARD DRINKS CONTAINING ALCOHOL DO YOU HAVE ON A TYPICAL DAY: 1
HOW OFTEN DO YOU HAVE SIX OR MORE DRINKS ON ONE OCCASION: 1
HOW MANY STANDARD DRINKS CONTAINING ALCOHOL DO YOU HAVE ON A TYPICAL DAY: 1 OR 2
HOW OFTEN DO YOU HAVE A DRINK CONTAINING ALCOHOL: MONTHLY OR LESS

## 2025-01-19 SDOH — ECONOMIC STABILITY: INCOME INSECURITY: IN THE LAST 12 MONTHS, WAS THERE A TIME WHEN YOU WERE NOT ABLE TO PAY THE MORTGAGE OR RENT ON TIME?: NO

## 2025-01-19 SDOH — ECONOMIC STABILITY: FOOD INSECURITY: WITHIN THE PAST 12 MONTHS, THE FOOD YOU BOUGHT JUST DIDN'T LAST AND YOU DIDN'T HAVE MONEY TO GET MORE.: NEVER TRUE

## 2025-01-19 SDOH — ECONOMIC STABILITY: FOOD INSECURITY: WITHIN THE PAST 12 MONTHS, YOU WORRIED THAT YOUR FOOD WOULD RUN OUT BEFORE YOU GOT MONEY TO BUY MORE.: NEVER TRUE

## 2025-01-19 SDOH — ECONOMIC STABILITY: TRANSPORTATION INSECURITY
IN THE PAST 12 MONTHS, HAS THE LACK OF TRANSPORTATION KEPT YOU FROM MEDICAL APPOINTMENTS OR FROM GETTING MEDICATIONS?: NO

## 2025-01-20 RX ORDER — LOSARTAN POTASSIUM 100 MG/1
TABLET ORAL
Qty: 90 TABLET | Refills: 1 | Status: SHIPPED | OUTPATIENT
Start: 2025-01-20

## 2025-01-20 NOTE — TELEPHONE ENCOUNTER
The pharmacy is  requesting a refill of the below medication which has been pended for you:     Requested Prescriptions     Pending Prescriptions Disp Refills    losartan (COZAAR) 100 MG tablet [Pharmacy Med Name: Losartan Potassium 100 MG Oral Tablet] 90 tablet 1     Sig: TAKE 1 TABLET BY MOUTH DAILY       Last Appointment Date: 8/21/2024  Next Appointment Date: 1/21/2025    No Known Allergies

## 2025-01-21 ENCOUNTER — OFFICE VISIT (OUTPATIENT)
Dept: FAMILY MEDICINE CLINIC | Age: 75
End: 2025-01-21

## 2025-01-21 VITALS
DIASTOLIC BLOOD PRESSURE: 70 MMHG | BODY MASS INDEX: 32.41 KG/M2 | HEIGHT: 71 IN | SYSTOLIC BLOOD PRESSURE: 138 MMHG | HEART RATE: 64 BPM | RESPIRATION RATE: 16 BRPM | WEIGHT: 231.5 LBS

## 2025-01-21 DIAGNOSIS — N40.0 BENIGN PROSTATIC HYPERPLASIA WITHOUT LOWER URINARY TRACT SYMPTOMS: ICD-10-CM

## 2025-01-21 DIAGNOSIS — E55.9 VITAMIN D DEFICIENCY: ICD-10-CM

## 2025-01-21 DIAGNOSIS — E78.5 HYPERLIPIDEMIA, UNSPECIFIED HYPERLIPIDEMIA TYPE: ICD-10-CM

## 2025-01-21 DIAGNOSIS — I10 PRIMARY HYPERTENSION: ICD-10-CM

## 2025-01-21 DIAGNOSIS — I25.810 CORONARY ARTERY DISEASE INVOLVING CORONARY BYPASS GRAFT OF NATIVE HEART WITHOUT ANGINA PECTORIS: ICD-10-CM

## 2025-01-21 DIAGNOSIS — J20.9 ACUTE BRONCHITIS, UNSPECIFIED ORGANISM: ICD-10-CM

## 2025-01-21 DIAGNOSIS — Z00.00 MEDICARE ANNUAL WELLNESS VISIT, SUBSEQUENT: Primary | ICD-10-CM

## 2025-01-21 PROCEDURE — 1036F TOBACCO NON-USER: CPT | Performed by: FAMILY MEDICINE

## 2025-01-21 PROCEDURE — 99213 OFFICE O/P EST LOW 20 MIN: CPT | Performed by: FAMILY MEDICINE

## 2025-01-21 PROCEDURE — G8427 DOCREV CUR MEDS BY ELIG CLIN: HCPCS | Performed by: FAMILY MEDICINE

## 2025-01-21 PROCEDURE — 3017F COLORECTAL CA SCREEN DOC REV: CPT | Performed by: FAMILY MEDICINE

## 2025-01-21 PROCEDURE — G0439 PPPS, SUBSEQ VISIT: HCPCS | Performed by: FAMILY MEDICINE

## 2025-01-21 PROCEDURE — 1123F ACP DISCUSS/DSCN MKR DOCD: CPT | Performed by: FAMILY MEDICINE

## 2025-01-21 PROCEDURE — G8417 CALC BMI ABV UP PARAM F/U: HCPCS | Performed by: FAMILY MEDICINE

## 2025-01-21 RX ORDER — AZITHROMYCIN 250 MG/1
TABLET, FILM COATED ORAL
Qty: 6 TABLET | Refills: 0 | Status: SHIPPED | OUTPATIENT
Start: 2025-01-21 | End: 2025-01-31

## 2025-01-21 RX ORDER — BENZONATATE 200 MG/1
200 CAPSULE ORAL 3 TIMES DAILY PRN
Qty: 30 CAPSULE | Refills: 0 | Status: SHIPPED | OUTPATIENT
Start: 2025-01-21 | End: 2025-01-31

## 2025-01-21 ASSESSMENT — ENCOUNTER SYMPTOMS
BLOOD IN STOOL: 0
COUGH: 0
SHORTNESS OF BREATH: 0
ABDOMINAL PAIN: 0
TROUBLE SWALLOWING: 0
ORTHOPNEA: 0
BACK PAIN: 0
NAUSEA: 0
CONSTIPATION: 0
EYE PAIN: 0
CHEST TIGHTNESS: 0
SORE THROAT: 0

## 2025-01-21 NOTE — PROGRESS NOTES
Subjective   Patient ID: Ned Massey is a 74 y.o. male.     Ashd   stable       Uri  /  sinus    now  2  weeks   cough       Bph   stable     urination   better and  prostate  bx         Hypertension  This is a chronic problem. The current episode started more than 1 year ago. The problem has been resolved since onset. The problem is controlled. Pertinent negatives include no chest pain, headaches, orthopnea, palpitations, peripheral edema or shortness of breath. There are no known risk factors for coronary artery disease. The current treatment provides significant improvement. There are no compliance problems.    Hyperlipidemia  This is a chronic problem. The current episode started more than 1 year ago. The problem is controlled. Pertinent negatives include no chest pain, focal weakness, myalgias or shortness of breath. Current antihyperlipidemic treatment includes statins. The current treatment provides significant improvement of lipids. There are no compliance problems.      Past Medical History:   Diagnosis Date    Arthritis     ASHD (arteriosclerotic heart disease)     CAD (coronary artery disease)     Status post bypass    Colon polyps 2009    colonoscopy  tubular  adenoma   rinesmith    Decreased sense of smell 05/22/2015    Epistaxis 05/22/2015    History of blood transfusion 2005    ????? with by pass???    Hyperlipidemia     Hypertension     Kidney stones 07/2022    dr artis    Mild carotid artery disease (HCC) 2013    Nasal obstruction 05/22/2015    Nasal septal perforation 05/22/2015     Past Surgical History:   Procedure Laterality Date    BLADDER SURGERY Right 12/20/2021    CYSTO, URETEROSCOPY, RIGHT RETROGRADE PYELOGRAM, URETERAL DILATION, WITH URETERAL BX RIGHT URETERAL STENT PLACEMENT performed by Jay Broussard Jr., MD at [facility] OR    BLADDER SURGERY Right 01/31/2022    CYSTOSCOPY RIGHT RETROGRADE PYELOGRAM, RIGHT STENT EXCHANGE performed by Jay Broussard Jr., MD at [facility] OR    BLADDER

## 2025-03-05 DIAGNOSIS — E78.5 HYPERLIPIDEMIA, UNSPECIFIED HYPERLIPIDEMIA TYPE: ICD-10-CM

## 2025-03-05 DIAGNOSIS — I25.10 CORONARY ARTERY DISEASE INVOLVING NATIVE CORONARY ARTERY OF NATIVE HEART WITHOUT ANGINA PECTORIS: ICD-10-CM

## 2025-03-05 DIAGNOSIS — I10 ESSENTIAL HYPERTENSION: ICD-10-CM

## 2025-03-05 NOTE — TELEPHONE ENCOUNTER
Date of last visit:  1/21/2025  Date of next visit:  6/24/2025    Requested Prescriptions     Pending Prescriptions Disp Refills    atorvastatin (LIPITOR) 40 MG tablet [Pharmacy Med Name: Atorvastatin Calcium 40 MG Oral Tablet] 90 tablet 1     Sig: TAKE 1 TABLET BY MOUTH DAILY    metoprolol tartrate (LOPRESSOR) 25 MG tablet [Pharmacy Med Name: Metoprolol Tartrate 25 MG Oral Tablet] 180 tablet 1     Sig: TAKE 1 TABLET BY MOUTH TWICE  DAILY

## 2025-03-06 RX ORDER — METOPROLOL TARTRATE 25 MG/1
TABLET, FILM COATED ORAL
Qty: 180 TABLET | Refills: 1 | Status: SHIPPED | OUTPATIENT
Start: 2025-03-06

## 2025-03-06 RX ORDER — ATORVASTATIN CALCIUM 40 MG/1
TABLET, FILM COATED ORAL
Qty: 90 TABLET | Refills: 1 | Status: SHIPPED | OUTPATIENT
Start: 2025-03-06

## 2025-03-17 RX ORDER — TAMSULOSIN HYDROCHLORIDE 0.4 MG/1
0.4 CAPSULE ORAL DAILY
Qty: 90 CAPSULE | Refills: 3 | Status: SHIPPED | OUTPATIENT
Start: 2025-03-17

## 2025-03-17 NOTE — TELEPHONE ENCOUNTER
Ned is requesting a refill of their   Requested Prescriptions     Pending Prescriptions Disp Refills    tamsulosin (FLOMAX) 0.4 MG capsule 90 capsule 3     Sig: Take 1 capsule by mouth daily   . Please advise.      Last Appt:  Visit date not found  Next Appt:   Visit date not found  Preferred pharmacy:     [facility] [address] [phone] - F [phone]

## 2025-03-24 NOTE — TELEPHONE ENCOUNTER
Date of last visit:  1/21/2025  Date of next visit:  6/24/2025    Requested Prescriptions     Pending Prescriptions Disp Refills    hydroCHLOROthiazide (HYDRODIURIL) 25 MG tablet 45 tablet 1     Sig: Take 0.5 tablets by mouth daily

## 2025-03-25 RX ORDER — HYDROCHLOROTHIAZIDE 25 MG/1
12.5 TABLET ORAL DAILY
Qty: 45 TABLET | Refills: 1 | Status: SHIPPED | OUTPATIENT
Start: 2025-03-25 | End: 2025-03-28

## 2025-03-27 NOTE — TELEPHONE ENCOUNTER
Date of last visit:  1/21/2025  Date of next visit:  6/24/2025    Requested Prescriptions     Pending Prescriptions Disp Refills    hydroCHLOROthiazide (HYDRODIURIL) 25 MG tablet [Pharmacy Med Name: HYDROCHLOROTHIAZIDE 25MG TABLETS] 90 tablet 1     Sig: TAKE ONE-HALF TABLET BY MOUTH ONCE DAILY

## 2025-03-28 RX ORDER — HYDROCHLOROTHIAZIDE 25 MG/1
TABLET ORAL
Qty: 90 TABLET | Refills: 1 | Status: SHIPPED | OUTPATIENT
Start: 2025-03-28

## 2025-06-10 RX ORDER — LOSARTAN POTASSIUM 100 MG/1
100 TABLET ORAL DAILY
Qty: 90 TABLET | Refills: 1 | Status: SHIPPED | OUTPATIENT
Start: 2025-06-10

## 2025-06-10 NOTE — TELEPHONE ENCOUNTER
Date of last visit:  1/21/2025  Date of next visit:  6/24/2025    Requested Prescriptions     Pending Prescriptions Disp Refills    losartan (COZAAR) 100 MG tablet [Pharmacy Med Name: Losartan Potassium 100 MG Oral Tablet] 90 tablet 1     Sig: TAKE 1 TABLET BY MOUTH DAILY

## 2025-06-17 ENCOUNTER — LAB (OUTPATIENT)
Dept: LAB | Age: 75
End: 2025-06-17

## 2025-06-17 DIAGNOSIS — E55.9 VITAMIN D DEFICIENCY: ICD-10-CM

## 2025-06-17 DIAGNOSIS — N40.0 BENIGN PROSTATIC HYPERPLASIA WITHOUT LOWER URINARY TRACT SYMPTOMS: ICD-10-CM

## 2025-06-17 DIAGNOSIS — I10 PRIMARY HYPERTENSION: ICD-10-CM

## 2025-06-17 LAB
25(OH)D3 SERPL-MCNC: 39 NG/ML (ref 30–100)
ALBUMIN SERPL BCG-MCNC: 3.9 G/DL (ref 3.4–4.9)
ALP SERPL-CCNC: 158 U/L (ref 40–129)
ALT SERPL W/O P-5'-P-CCNC: 29 U/L (ref 10–50)
ANION GAP SERPL CALC-SCNC: 12 MEQ/L (ref 8–16)
AST SERPL-CCNC: 27 U/L (ref 10–50)
BASOPHILS ABSOLUTE: 0 THOU/MM3 (ref 0–0.1)
BASOPHILS NFR BLD AUTO: 0.4 %
BILIRUB SERPL-MCNC: 0.5 MG/DL (ref 0.3–1.2)
BUN SERPL-MCNC: 16 MG/DL (ref 8–23)
CALCIUM SERPL-MCNC: 9.8 MG/DL (ref 8.8–10.2)
CHLORIDE SERPL-SCNC: 100 MEQ/L (ref 98–111)
CHOLEST SERPL-MCNC: 108 MG/DL (ref 100–199)
CO2 SERPL-SCNC: 23 MEQ/L (ref 22–29)
CREAT SERPL-MCNC: 0.9 MG/DL (ref 0.7–1.2)
DEPRECATED RDW RBC AUTO: 46.1 FL (ref 35–45)
EOSINOPHIL NFR BLD AUTO: 3.3 %
EOSINOPHILS ABSOLUTE: 0.3 THOU/MM3 (ref 0–0.4)
ERYTHROCYTE [DISTWIDTH] IN BLOOD BY AUTOMATED COUNT: 13.2 % (ref 11.5–14.5)
GFR SERPL CREATININE-BSD FRML MDRD: 88 ML/MIN/1.73M2
GLUCOSE SERPL-MCNC: 89 MG/DL (ref 74–109)
HCT VFR BLD AUTO: 44.4 % (ref 42–52)
HDLC SERPL-MCNC: 33 MG/DL
HGB BLD-MCNC: 14.9 GM/DL (ref 14–18)
IMM GRANULOCYTES # BLD AUTO: 0.02 THOU/MM3 (ref 0–0.07)
IMM GRANULOCYTES NFR BLD AUTO: 0.3 %
LDLC SERPL CALC-MCNC: 55 MG/DL
LYMPHOCYTES ABSOLUTE: 2.4 THOU/MM3 (ref 1–4.8)
LYMPHOCYTES NFR BLD AUTO: 30.9 %
MCH RBC QN AUTO: 31.9 PG (ref 26–33)
MCHC RBC AUTO-ENTMCNC: 33.6 GM/DL (ref 32.2–35.5)
MCV RBC AUTO: 95.1 FL (ref 80–94)
MONOCYTES ABSOLUTE: 0.8 THOU/MM3 (ref 0.4–1.3)
MONOCYTES NFR BLD AUTO: 10.4 %
NEUTROPHILS ABSOLUTE: 4.3 THOU/MM3 (ref 1.8–7.7)
NEUTROPHILS NFR BLD AUTO: 54.7 %
NRBC BLD AUTO-RTO: 0 /100 WBC
PLATELET # BLD AUTO: 204 THOU/MM3 (ref 130–400)
PMV BLD AUTO: 10.3 FL (ref 9.4–12.4)
POTASSIUM SERPL-SCNC: 4.1 MEQ/L (ref 3.5–5.2)
PROT SERPL-MCNC: 7.4 G/DL (ref 6.4–8.3)
PSA SERPL-MCNC: 5.9 NG/ML (ref 0–1)
RBC # BLD AUTO: 4.67 MILL/MM3 (ref 4.7–6.1)
SODIUM SERPL-SCNC: 135 MEQ/L (ref 135–145)
TRIGL SERPL-MCNC: 99 MG/DL (ref 0–199)
TSH SERPL DL<=0.05 MIU/L-ACNC: 2.45 UIU/ML (ref 0.27–4.2)
WBC # BLD AUTO: 7.8 THOU/MM3 (ref 4.8–10.8)

## 2025-06-18 ENCOUNTER — RESULTS FOLLOW-UP (OUTPATIENT)
Dept: FAMILY MEDICINE CLINIC | Age: 75
End: 2025-06-18

## 2025-06-18 NOTE — TELEPHONE ENCOUNTER
----- Message from Dr. Alex Cooper MD sent at 6/18/2025  5:46 AM EDT -----  Strahl levels great at 108 and there is no anemia and vitamin D level is normal as well as thyroid  Kidney and liver function ok  Psa 5.9 and was 5.68 which is ok    Keep appt  6-24

## 2025-06-24 ENCOUNTER — OFFICE VISIT (OUTPATIENT)
Dept: FAMILY MEDICINE CLINIC | Age: 75
End: 2025-06-24

## 2025-06-24 VITALS
BODY MASS INDEX: 31.53 KG/M2 | HEART RATE: 64 BPM | WEIGHT: 225.25 LBS | RESPIRATION RATE: 20 BRPM | SYSTOLIC BLOOD PRESSURE: 120 MMHG | HEIGHT: 71 IN | DIASTOLIC BLOOD PRESSURE: 60 MMHG

## 2025-06-24 DIAGNOSIS — E78.5 HYPERLIPIDEMIA, UNSPECIFIED HYPERLIPIDEMIA TYPE: ICD-10-CM

## 2025-06-24 DIAGNOSIS — I25.810 CORONARY ARTERY DISEASE INVOLVING AUTOLOGOUS VEIN CORONARY BYPASS GRAFT WITHOUT ANGINA PECTORIS: ICD-10-CM

## 2025-06-24 DIAGNOSIS — I10 PRIMARY HYPERTENSION: Primary | ICD-10-CM

## 2025-06-24 DIAGNOSIS — N20.0 KIDNEY STONES: ICD-10-CM

## 2025-06-24 DIAGNOSIS — L57.0 ACTINIC KERATOSIS OF SCALP: ICD-10-CM

## 2025-06-24 PROCEDURE — 1123F ACP DISCUSS/DSCN MKR DOCD: CPT | Performed by: FAMILY MEDICINE

## 2025-06-24 PROCEDURE — G8427 DOCREV CUR MEDS BY ELIG CLIN: HCPCS | Performed by: FAMILY MEDICINE

## 2025-06-24 PROCEDURE — 17000 DESTRUCT PREMALG LESION: CPT | Performed by: FAMILY MEDICINE

## 2025-06-24 PROCEDURE — G8417 CALC BMI ABV UP PARAM F/U: HCPCS | Performed by: FAMILY MEDICINE

## 2025-06-24 PROCEDURE — 1036F TOBACCO NON-USER: CPT | Performed by: FAMILY MEDICINE

## 2025-06-24 PROCEDURE — 99213 OFFICE O/P EST LOW 20 MIN: CPT | Performed by: FAMILY MEDICINE

## 2025-06-24 ASSESSMENT — ENCOUNTER SYMPTOMS
CHEST TIGHTNESS: 0
BLOOD IN STOOL: 0
COUGH: 0
SHORTNESS OF BREATH: 0
SORE THROAT: 0
ABDOMINAL PAIN: 0
CONSTIPATION: 0
BACK PAIN: 0
TROUBLE SWALLOWING: 0
NAUSEA: 0
EYE PAIN: 0

## 2025-06-24 NOTE — PROGRESS NOTES
Subjective   Patient ID: Ned Massey is a 75 y.o. male.      Kidney   stones     stay     Labs  all  stable     Hypertension  This is a chronic problem. The current episode started more than 1 year ago. The problem has been resolved since onset. The problem is controlled. Pertinent negatives include no chest pain, headaches, malaise/fatigue, palpitations, peripheral edema or shortness of breath. The current treatment provides significant improvement. There are no compliance problems.    Coronary Artery Disease  Presents for follow-up visit. Pertinent negatives include no chest pain, chest tightness, dizziness, leg swelling, muscle weakness, palpitations, shortness of breath or weight gain. Risk factors include hyperlipidemia. The symptoms have been resolved. Compliance with diet is good. Compliance with exercise is good. Compliance with medications is good.   Hyperlipidemia  This is a chronic problem. The current episode started more than 1 year ago. The problem is controlled. Pertinent negatives include no chest pain or shortness of breath. Current antihyperlipidemic treatment includes statins. The current treatment provides significant improvement of lipids. There are no compliance problems.      Past Medical History:   Diagnosis Date    Arthritis     ASHD (arteriosclerotic heart disease)     CAD (coronary artery disease)     Status post bypass    Colon polyps 2009    colonoscopy  tubular  adenoma   [facility]    Decreased sense of smell 05/22/2015    Epistaxis 05/22/2015    History of blood transfusion 2005    ????? with by pass???    Hyperlipidemia     Hypertension     Kidney stones 07/2022    dr artis    Mild carotid artery disease 2013    Nasal obstruction 05/22/2015    Nasal septal perforation 05/22/2015     Past Surgical History:   Procedure Laterality Date    BLADDER SURGERY Right 12/20/2021    CYSTO, URETEROSCOPY, RIGHT RETROGRADE PYELOGRAM, URETERAL DILATION, WITH URETERAL BX RIGHT URETERAL STENT

## 2025-07-30 DIAGNOSIS — I25.10 CORONARY ARTERY DISEASE INVOLVING NATIVE CORONARY ARTERY OF NATIVE HEART WITHOUT ANGINA PECTORIS: ICD-10-CM

## 2025-07-30 DIAGNOSIS — I10 ESSENTIAL HYPERTENSION: ICD-10-CM

## 2025-07-31 RX ORDER — METOPROLOL TARTRATE 25 MG/1
25 TABLET, FILM COATED ORAL 2 TIMES DAILY
Qty: 180 TABLET | Refills: 3 | Status: SHIPPED | OUTPATIENT
Start: 2025-07-31

## 2025-07-31 NOTE — TELEPHONE ENCOUNTER
Date of last visit:  6/24/2025  Date of next visit:  10/29/2025    Requested Prescriptions     Pending Prescriptions Disp Refills    metoprolol tartrate (LOPRESSOR) 25 MG tablet [Pharmacy Med Name: Metoprolol Tartrate 25 MG Oral Tablet] 180 tablet 3     Sig: TAKE 1 TABLET BY MOUTH TWICE  DAILY

## 2025-08-21 DIAGNOSIS — E78.5 HYPERLIPIDEMIA, UNSPECIFIED HYPERLIPIDEMIA TYPE: ICD-10-CM

## 2025-08-22 RX ORDER — ATORVASTATIN CALCIUM 40 MG/1
40 TABLET, FILM COATED ORAL DAILY
Qty: 90 TABLET | Refills: 3 | Status: SHIPPED | OUTPATIENT
Start: 2025-08-22

## 2025-08-25 RX ORDER — HYDROCHLOROTHIAZIDE 25 MG/1
12.5 TABLET ORAL DAILY
Qty: 45 TABLET | Refills: 0 | Status: SHIPPED | OUTPATIENT
Start: 2025-08-25

## (undated) DEVICE — GUIDEWIRE URO L150CM DIA0.035IN STIFF NIT HYDRPHLC STR TIP

## (undated) DEVICE — URETEROSCOPIC BIOPSY FORCEPS: Brand: PIRANHA

## (undated) DEVICE — SINGLE ACTION PUMPING SYSTEM

## (undated) DEVICE — CYSTO PACK: Brand: MEDLINE INDUSTRIES, INC.

## (undated) DEVICE — ADHESIVE SKIN CLSR 0.7ML TOP DERMBND ADV

## (undated) DEVICE — SINGLE-USE DIGITAL FLEXIBLE URETEROSCOPE: Brand: LITHOVUE

## (undated) DEVICE — ELECTRODE, COAGULATING, STERILE, 3FR.: Brand: N.A.

## (undated) DEVICE — ARM DRAPE

## (undated) DEVICE — PLUG,CATHETER,DRAINAGE PROTECTOR,TUBE: Brand: MEDLINE

## (undated) DEVICE — BLADELESS OBTURATOR: Brand: WECK VISTA

## (undated) DEVICE — ADAPTER URO SCP UROLOK LL

## (undated) DEVICE — SNARE POLYP SM W13MMXL240CM SHTH DIA2.4MM OVL FLX DISP

## (undated) DEVICE — DRAINBAG,ANTI-REFLUX TOWER,L/F,2000ML,LL: Brand: MEDLINE

## (undated) DEVICE — LOOP VES W25MM THK1MM MAXI RED SIL FLD REPELLENT 100 PER

## (undated) DEVICE — OPEN-END FLEXI-TIP URETERAL CATHETER: Brand: FLEXI-TIP

## (undated) DEVICE — SOLUTION IRRIG 3000ML 0.9% SOD CHL USP UROMATIC PLAS CONT

## (undated) DEVICE — TRAP POLYP ETRAP

## (undated) DEVICE — SOLUTION ANTIFOG VIS SYS CLEARIFY LAPSCP

## (undated) DEVICE — SUTURE VCRL SZ 3-0 L27IN ABSRB VLT RB-1 L17MM 1/2 CIR J305H

## (undated) DEVICE — AIRSEAL 12 MM ACCESS PORT AND PALM GRIP OBTURATOR WITH BLADELESS OPTICAL TIP, 120 MM LENGTH: Brand: AIRSEAL

## (undated) DEVICE — PORT HND ACC L120MM 12MM BLDELSS OPT TIP AIRSEAL

## (undated) DEVICE — BALLOON DILATATION CATHETER KIT: Brand: UROMAX ULTRA KIT

## (undated) DEVICE — SOLUTION IV IRRIG WATER 1000ML POUR BRL 2F7114

## (undated) DEVICE — TROCAR: Brand: KII FIOS FIRST ENTRY

## (undated) DEVICE — CONTAINER,SPECIMEN,PNEU TUBE,4OZ,OR STRL: Brand: MEDLINE

## (undated) DEVICE — INSUFFLATION NEEDLE TO ESTABLISH PNEUMOPERITONEUM.: Brand: INSUFFLATION NEEDLE

## (undated) DEVICE — Device

## (undated) DEVICE — CANNULA SEAL

## (undated) DEVICE — ENDO KIT: Brand: MEDLINE INDUSTRIES, INC.

## (undated) DEVICE — SYRINGE, LUER LOCK, 60ML: Brand: MEDLINE

## (undated) DEVICE — SUTURE MCRYL SZ 4-0 L27IN ABSRB UD L19MM PS-2 1/2 CIR PRIM Y426H

## (undated) DEVICE — DRAIN,WOUND,15FR,3/16,FULL-FLUTED: Brand: MEDLINE

## (undated) DEVICE — GLOVE ORANGE PI 7 1/2   MSG9075

## (undated) DEVICE — 3M™ STERI-STRIP™ COMPOUND BENZOIN TINCTURE 40 BAGS/CARTON 4 CARTONS/CASE C1544: Brand: 3M™ STERI-STRIP™

## (undated) DEVICE — AGENT HEMSTAT W4XL8IN OXIDIZED REGENERATED CELOS ABSRB

## (undated) DEVICE — GOWN,SIRUS,NON REINFRCD,LARGE,SET IN SL: Brand: MEDLINE

## (undated) DEVICE — CLIP INT L POLYMER LOK LIG HEM O LOK

## (undated) DEVICE — CATHETER URETH BLLN 30CC 22FR SIL ELASTMR F 3 W SPEC M RND

## (undated) DEVICE — UNIVERSAL MONOPOLAR LAPAROSCOPIC CABLE 10FT, 4MM PIN CONNECTOR: Brand: CONMED

## (undated) DEVICE — CONNECTOR TBNG AUX H2O JET DISP FOR OLY 160/180 SER

## (undated) DEVICE — COLUMN DRAPE

## (undated) DEVICE — PUMP SUC IRR TBNG L10FT W/ HNDPC ASSEMB STRYKEFLOW 2

## (undated) DEVICE — SEALANT TISS 10 CC FIBRIN VISTASEAL

## (undated) DEVICE — POUCH DRNGE FLX BND INTEGR RAIL CLMP DISP EZ CTCH

## (undated) DEVICE — GLOVE ORTHO 8   MSG9480

## (undated) DEVICE — DUAL LUMEN URETERAL CATHETER

## (undated) DEVICE — NITINOL STONE RETRIEVAL BASKET: Brand: ZERO TIP

## (undated) DEVICE — SET LNR RED GRN W/ BASE CLEANASCOPE

## (undated) DEVICE — 3M™ IOBAN™ 2 ANTIMICROBIAL INCISE DRAPE 6650EZ: Brand: IOBAN™ 2

## (undated) DEVICE — SUTURE ETHLN SZ 2-0 L30IN NONABSORBABLE BLK L36MM FSLX 3/8 1674H

## (undated) DEVICE — SYRINGE CATH TIP 50ML

## (undated) DEVICE — Z DUPLICATE USE 2517136 APPLICATOR LAP 45 CM 2 FLX VISTASEAL

## (undated) DEVICE — EVACUATOR SURG 100CC SIL BLB SUCT RESVR FOR CLS WND DRNGE

## (undated) DEVICE — PACK-MAJOR

## (undated) DEVICE — SUTURE MCRYL SZ 4-0 L27IN ABSRB UD RB-1 L17MM 1/2 CIR Y214H

## (undated) DEVICE — TIP COVER ACCESSORY

## (undated) DEVICE — SUTURE ABSORBABLE MONOFILAMENT 4-0 RB1 27 IN UD MONOCRYL + MCP214H

## (undated) DEVICE — SYSTEM PMP VAC SYR 10CC CONT FLO SGL ACT 1 W VLV SAPS

## (undated) DEVICE — ELECTRO LUBE IS A SINGLE PATIENT USE DEVICE THAT IS INTENDED TO BE USED ON ELECTROSURGICAL ELECTRODES TO REDUCE STICKING.: Brand: KEY SURGICAL ELECTRO LUBE

## (undated) DEVICE — BALLOON DILATATION CATHETER KIT: Brand: PASSPORT KIT

## (undated) DEVICE — PATIENT RETURN ELECTRODE, SINGLE-USE, CONTACT QUALITY MONITORING, ADULT, WITH 9FT CORD, FOR PATIENTS WEIGING OVER 33LBS. (15KG): Brand: MEGADYNE

## (undated) DEVICE — TRI-LUMEN FILTERED TUBE SET WITH ACTIVATED CHARCOAL FILTER: Brand: AIRSEAL